# Patient Record
Sex: MALE | Race: OTHER | HISPANIC OR LATINO | Employment: OTHER | URBAN - METROPOLITAN AREA
[De-identification: names, ages, dates, MRNs, and addresses within clinical notes are randomized per-mention and may not be internally consistent; named-entity substitution may affect disease eponyms.]

---

## 2019-04-26 ENCOUNTER — TRANSCRIBE ORDERS (OUTPATIENT)
Dept: ADMINISTRATIVE | Facility: HOSPITAL | Age: 79
End: 2019-04-26

## 2019-04-26 ENCOUNTER — APPOINTMENT (OUTPATIENT)
Dept: LAB | Age: 79
End: 2019-04-26
Payer: MEDICARE

## 2019-04-26 DIAGNOSIS — I10 ESSENTIAL HYPERTENSION, MALIGNANT: ICD-10-CM

## 2019-04-26 DIAGNOSIS — N18.30 CHRONIC KIDNEY DISEASE, STAGE III (MODERATE) (HCC): ICD-10-CM

## 2019-04-26 DIAGNOSIS — I25.119 ATHEROSCLEROSIS OF NATIVE CORONARY ARTERY WITH ANGINA PECTORIS, UNSPECIFIED WHETHER NATIVE OR TRANSPLANTED HEART (HCC): ICD-10-CM

## 2019-04-26 DIAGNOSIS — Z79.01 LONG TERM (CURRENT) USE OF ANTICOAGULANTS: ICD-10-CM

## 2019-04-26 DIAGNOSIS — I10 ESSENTIAL HYPERTENSION, MALIGNANT: Primary | ICD-10-CM

## 2019-04-26 LAB
ALBUMIN SERPL BCP-MCNC: 3.7 G/DL (ref 3.5–5)
ALP SERPL-CCNC: 83 U/L (ref 46–116)
ALT SERPL W P-5'-P-CCNC: 14 U/L (ref 12–78)
ANION GAP SERPL CALCULATED.3IONS-SCNC: 7 MMOL/L (ref 4–13)
AST SERPL W P-5'-P-CCNC: 15 U/L (ref 5–45)
BASOPHILS # BLD AUTO: 0.05 THOUSANDS/ΜL (ref 0–0.1)
BASOPHILS NFR BLD AUTO: 2 % (ref 0–1)
BILIRUB SERPL-MCNC: 0.6 MG/DL (ref 0.2–1)
BUN SERPL-MCNC: 40 MG/DL (ref 5–25)
CALCIUM SERPL-MCNC: 9.1 MG/DL (ref 8.3–10.1)
CHLORIDE SERPL-SCNC: 110 MMOL/L (ref 100–108)
CHOLEST SERPL-MCNC: 141 MG/DL (ref 50–200)
CO2 SERPL-SCNC: 21 MMOL/L (ref 21–32)
CREAT SERPL-MCNC: 2.78 MG/DL (ref 0.6–1.3)
EOSINOPHIL # BLD AUTO: 0.3 THOUSAND/ΜL (ref 0–0.61)
EOSINOPHIL NFR BLD AUTO: 9 % (ref 0–6)
ERYTHROCYTE [DISTWIDTH] IN BLOOD BY AUTOMATED COUNT: 13.5 % (ref 11.6–15.1)
GFR SERPL CREATININE-BSD FRML MDRD: 21 ML/MIN/1.73SQ M
GLUCOSE P FAST SERPL-MCNC: 82 MG/DL (ref 65–99)
HCT VFR BLD AUTO: 32.4 % (ref 36.5–49.3)
HDLC SERPL-MCNC: 52 MG/DL (ref 40–60)
HGB BLD-MCNC: 10.2 G/DL (ref 12–17)
IMM GRANULOCYTES # BLD AUTO: 0 THOUSAND/UL (ref 0–0.2)
IMM GRANULOCYTES NFR BLD AUTO: 0 % (ref 0–2)
INR PPP: 2.35 (ref 0.86–1.17)
LDLC SERPL CALC-MCNC: 71 MG/DL (ref 0–100)
LYMPHOCYTES # BLD AUTO: 1.25 THOUSANDS/ΜL (ref 0.6–4.47)
LYMPHOCYTES NFR BLD AUTO: 39 % (ref 14–44)
MCH RBC QN AUTO: 32.4 PG (ref 26.8–34.3)
MCHC RBC AUTO-ENTMCNC: 31.5 G/DL (ref 31.4–37.4)
MCV RBC AUTO: 103 FL (ref 82–98)
MONOCYTES # BLD AUTO: 0.29 THOUSAND/ΜL (ref 0.17–1.22)
MONOCYTES NFR BLD AUTO: 9 % (ref 4–12)
NEUTROPHILS # BLD AUTO: 1.29 THOUSANDS/ΜL (ref 1.85–7.62)
NEUTS SEG NFR BLD AUTO: 41 % (ref 43–75)
NONHDLC SERPL-MCNC: 89 MG/DL
NRBC BLD AUTO-RTO: 0 /100 WBCS
PLATELET # BLD AUTO: 157 THOUSANDS/UL (ref 149–390)
PMV BLD AUTO: 10.5 FL (ref 8.9–12.7)
POTASSIUM SERPL-SCNC: 4.4 MMOL/L (ref 3.5–5.3)
PROT SERPL-MCNC: 7.9 G/DL (ref 6.4–8.2)
PROTHROMBIN TIME: 25.8 SECONDS (ref 11.8–14.2)
RBC # BLD AUTO: 3.15 MILLION/UL (ref 3.88–5.62)
SODIUM SERPL-SCNC: 138 MMOL/L (ref 136–145)
TRIGL SERPL-MCNC: 89 MG/DL
WBC # BLD AUTO: 3.18 THOUSAND/UL (ref 4.31–10.16)

## 2019-04-26 PROCEDURE — 85610 PROTHROMBIN TIME: CPT

## 2019-04-26 PROCEDURE — 80053 COMPREHEN METABOLIC PANEL: CPT

## 2019-04-26 PROCEDURE — 36415 COLL VENOUS BLD VENIPUNCTURE: CPT

## 2019-04-26 PROCEDURE — 80061 LIPID PANEL: CPT

## 2019-04-26 PROCEDURE — 85025 COMPLETE CBC W/AUTO DIFF WBC: CPT

## 2019-07-05 ENCOUNTER — HOSPITAL ENCOUNTER (EMERGENCY)
Facility: HOSPITAL | Age: 79
Discharge: HOME | End: 2019-07-06
Attending: EMERGENCY MEDICINE
Payer: MEDICARE

## 2019-07-05 DIAGNOSIS — L03.213 PRESEPTAL CELLULITIS: Primary | ICD-10-CM

## 2019-07-05 DIAGNOSIS — H57.89 EYE IRRITATION: ICD-10-CM

## 2019-07-05 PROCEDURE — 99283 EMERGENCY DEPT VISIT LOW MDM: CPT

## 2019-07-05 RX ORDER — TETRACAINE HYDROCHLORIDE 5 MG/ML
2 SOLUTION OPHTHALMIC ONCE
Status: COMPLETED | OUTPATIENT
Start: 2019-07-06 | End: 2019-07-05

## 2019-07-05 RX ADMIN — FLUORESCEIN SODIUM 1 STRIP: 1 STRIP OPHTHALMIC at 23:53

## 2019-07-05 RX ADMIN — TETRACAINE HYDROCHLORIDE 2 DROP: 5 SOLUTION OPHTHALMIC at 23:53

## 2019-07-06 VITALS
WEIGHT: 139 LBS | HEART RATE: 58 BPM | TEMPERATURE: 97.8 F | DIASTOLIC BLOOD PRESSURE: 61 MMHG | SYSTOLIC BLOOD PRESSURE: 102 MMHG | OXYGEN SATURATION: 95 % | RESPIRATION RATE: 18 BRPM

## 2019-07-06 PROCEDURE — 99283 EMERGENCY DEPT VISIT LOW MDM: CPT | Performed by: EMERGENCY MEDICINE

## 2019-07-06 RX ORDER — CLINDAMYCIN HYDROCHLORIDE 150 MG/1
450 CAPSULE ORAL EVERY 8 HOURS SCHEDULED
Qty: 90 CAPSULE | Refills: 0 | Status: ON HOLD | OUTPATIENT
Start: 2019-07-06 | End: 2019-07-21

## 2019-07-06 RX ORDER — ERYTHROMYCIN 5 MG/G
OINTMENT OPHTHALMIC
Qty: 1 G | Refills: 0 | Status: SHIPPED | OUTPATIENT
Start: 2019-07-06 | End: 2019-07-21 | Stop reason: HOSPADM

## 2019-07-06 NOTE — DISCHARGE INSTRUCTIONS
Periorbital Cellulitis in Adults   WHAT YOU NEED TO KNOW:   Periorbital cellulitis is an infection of the skin and tissues in the front of your eye  The infection can quickly cause vision problems  It can spread to your brain and cause meningitis  Periorbital cellulitis must be treated immediately to prevent serious complications  DISCHARGE INSTRUCTIONS:   Return to the emergency department if:   · You lose vision in your infected eye  · You have vision problems, such as double vision  · You have difficulty moving your eyeball  · You cannot close your eye due to swelling  · You have a fever  · You develop a headache and are vomiting  · You have a stiff neck  Contact your healthcare provider if:   · Your symptoms do not get better within 24 hours of treatment  · Your symptoms return  · You have questions or concerns about your condition or care  Medicines:   · Antibiotics  are used to treat a bacterial infection  · Take your medicine as directed  Contact your healthcare provider if you think your medicine is not helping or if you have side effects  Tell him or her if you are allergic to any medicine  Keep a list of the medicines, vitamins, and herbs you take  Include the amounts, and when and why you take them  Bring the list or the pill bottles to follow-up visits  Carry your medicine list with you in case of an emergency  Follow up with your healthcare provider as directed: You may need to be referred to other healthcare providers  You may also need more treatment  Write down your questions so you remember to ask them during your visits  Keep your eyes clean:  Clean your eyes with warm water daily and as needed  Wash your hands with soap and water before and after you clean your eyes  Protect your eyes:  Do not scratch or rub your eyes    © 2017 [employer] Information is for End User's use only and may not be sold, redistributed or otherwise used for commercial purposes  All illustrations and images included in CareNotes® are the copyrighted property of [employer]  or [employer]  The above information is an  only  It is not intended as medical advice for individual conditions or treatments  Talk to your doctor, nurse or pharmacist before following any medical regimen to see if it is safe and effective for you  Clindamycin (By mouth)   Clindamycin (lfwu-mi-JGH-sin)  Treats infections  Brand Name(s): Cleocin, Cleocin HCl, Cleocin Pediatric   There may be other brand names for this medicine  When This Medicine Should Not Be Used: This medicine is not right for everyone  Do not use it if you had an allergic reaction to clindamycin or lincomycin  How to Use This Medicine:   Capsule, Liquid  · Your doctor will tell you how much medicine to use  Do not use more than directed  · Capsule: Swallow with a full glass of water  · Oral liquid: Measure the oral liquid medicine with a marked measuring spoon, oral syringe, or medicine cup  · Take all of the medicine in your prescription to clear up your infection, even if you feel better after the first few doses  · Missed dose: Take a dose as soon as you remember  If it is almost time for your next dose, wait until then and take a regular dose  Do not take extra medicine to make up for a missed dose  · Store the medicine in a closed container at room temperature, away from heat, moisture, and direct light  Oral liquid: Do not refrigerate or freeze  Throw away any unused medicine after 14 days  Drugs and Foods to Avoid:   Ask your doctor or pharmacist before using any other medicine, including over-the-counter medicines, vitamins, and herbal products  · Some medicines can affect how clindamycin works  Tell your doctor if you are using erythromycin    Warnings While Using This Medicine:   · Tell your doctor if you are pregnant or breastfeeding, or if you have kidney disease, liver disease, allergies (including an allergy to aspirin), asthma, or stomach or bowel problems (including colitis)  · This medicine may cause severe skin reactions  · This medicine can cause diarrhea  Call your doctor if the diarrhea becomes severe, does not stop, or is bloody  Do not take any medicine to stop diarrhea until you have talked to your doctor  Diarrhea can occur 2 months or more after you stop taking this medicine  · Keep all medicine out of the reach of children  Never share your medicine with anyone  Possible Side Effects While Using This Medicine:   Call your doctor right away if you notice any of these side effects:  · Allergic reaction: Itching or hives, swelling in your face or hands, swelling or tingling in your mouth or throat, chest tightness, trouble breathing  · Blistering, peeling, red skin rash  · Fever, chills, cough, sore throat, body aches  · Severe diarrhea that does not go away, stomach cramps  · Unusual bleeding, bruising, or weakness  If you notice these less serious side effects, talk with your doctor:   · Mild diarrhea, nausea  If you notice other side effects that you think are caused by this medicine, tell your doctor  Call your doctor for medical advice about side effects  You may report side effects to FDA at 3-833-FDA-5441  © 2017 [employer] Information is for End User's use only and may not be sold, redistributed or otherwise used for commercial purposes  The above information is an  only  It is not intended as medical advice for individual conditions or treatments  Talk to your doctor, nurse or pharmacist before following any medical regimen to see if it is safe and effective for you

## 2019-07-06 NOTE — ED PROVIDER NOTES
History  Chief Complaint   Patient presents with    Eye Swelling     Per daughter pt with b/l eye swelling x2 weeks seen by eye Dr was told to stop one of his eye drops start a new one but swelling has gotten worse pt state feels like something is moving is his eye lashes     Pt is an [de-identified]year old male with a PMH of primary open angle glaucoma, bilateral cataracts, macular degeneration, CHF, CAD s/p bypass, hypertension presenting with bilateral eye erythema, swelling and itching  Pt has perioral erythema and swelling  States his "eyebrows itch, and something is moving in his right eyebrow, maybe a bug"  Chronic vision changes that are stable  Denies eye pain  Pt has been taking "drops" for his eye after seeing eye doctor recently which has not been working  States the swelling has been worsening  Denies fevers, photophobia or nausea  None       Past Medical History:   Diagnosis Date    CHF [facility]congestive heart failure) ([facility] [address] )     Renal disorder        Past Surgical History:   Procedure Laterality Date    ABDOMINAL SURGERY      CARDIAC PACEMAKER PLACEMENT         History reviewed  No pertinent family history  I have reviewed and agree with the history as documented  Social History     Tobacco Use    Smoking status: Former Smoker    Smokeless tobacco: Never Used    Tobacco comment: quit 40 years ago   Substance Use Topics    Alcohol use: Not Currently    Drug use: Not Currently        Review of Systems   Constitutional: Negative for chills, diaphoresis and fever  HENT: Positive for facial swelling  Eyes: Positive for discharge, itching and visual disturbance  Negative for photophobia, pain and redness  Respiratory: Negative for shortness of breath  Cardiovascular: Negative for chest pain  Gastrointestinal: Negative for nausea and vomiting  Neurological: Negative for dizziness, light-headedness and headaches         Physical Exam  Physical Exam   Constitutional: He is oriented to person, place, and time  He appears well-developed and well-nourished  No distress  HENT:   Head: Normocephalic and atraumatic  Head is with right periorbital erythema and with left periorbital erythema  Right Ear: External ear normal    Left Ear: External ear normal    Nose: Nose normal    Mouth/Throat: Oropharynx is clear and moist  No oropharyngeal exudate  Bilateral erythema and swelling periorbitally  Flaking and dryness of the skin  Eyes: Pupils are equal, round, and reactive to light  Conjunctivae and EOM are normal  Right eye exhibits no chemosis, no discharge and no exudate  No foreign body present in the right eye  Left eye exhibits no chemosis, no discharge and no exudate  No foreign body present in the left eye  Right conjunctiva is not injected  Right conjunctiva has no hemorrhage  Left conjunctiva is not injected  Left conjunctiva has no hemorrhage  Right eye exhibits normal extraocular motion and no nystagmus  Left eye exhibits normal extraocular motion and no nystagmus  Neck: Normal range of motion  Neck supple  Cardiovascular: Normal rate, regular rhythm, normal heart sounds and intact distal pulses  Pulmonary/Chest: Effort normal and breath sounds normal    Musculoskeletal: Normal range of motion  Neurological: He is alert and oriented to person, place, and time  Skin: Skin is warm and dry  Capillary refill takes less than 2 seconds  He is not diaphoretic         Vital Signs  ED Triage Vitals [07/05/19 2250]   Temperature Pulse Respirations Blood Pressure SpO2   97 8 °F (36 6 °C) 65 18 127/65 99 %      Temp src Heart Rate Source Patient Position - Orthostatic VS BP Location FiO2 (%)   -- Monitor Sitting Right arm --      Pain Score       3           Vitals:    07/05/19 2250 07/05/19 2359   BP: 127/65 102/61   Pulse: 65 58   Patient Position - Orthostatic VS: Sitting Lying         Visual Acuity      ED Medications  Medications   tetracaine 0 5 % ophthalmic solution 2 drop (2 drops Both Eyes Given by Other 7/5/19 8325)   fluorescein sodium sterile ophthalmic strip 1 strip (1 strip Both Eyes Given by Other 7/5/19 6069)       Diagnostic Studies  Results Reviewed     None                 No orders to display              Procedures  Procedures       ED Course                               MDM  Number of Diagnoses or Management Options  Eye irritation:   Preseptal cellulitis:   Diagnosis management comments: Patient has been following with ophthalmology for this issue outpatient  He has multiple chronic eye disorders  They have been tempering with medications that have not improved his symptoms  Covering patient with antibiotics for preseptal cellulitis  Call physician tomorrow and notify them of worsening condition  Educated on return precautions for orbital cellulitis  Stable and ready for discharge  Disposition  Final diagnoses:   Preseptal cellulitis   Eye irritation     Time reflects when diagnosis was documented in both MDM as applicable and the Disposition within this note     Time User Action Codes Description Comment    7/6/2019 12:59 AM Shweta Frausto Add [Q27 014] Preseptal cellulitis     7/6/2019 12:59 AM Shweta Frausto Add [H57 89] Eye irritation       ED Disposition     ED Disposition Condition Date/Time Comment    Discharge Good Sat Jul 6, 2019 12:59 AM Philly Latham discharge to home/self care  Follow-up Information     Follow up With Specialties Details Why [address], [place] [facility], Physician Assistant Schedule an appointment as soon as possible for a visit  As needed [id number] [facility] Schedule an appointment as soon as possible for a visit  As needed [address]            Discharge Medication List as of 7/6/2019  1:01 AM      START taking these medications    Details   clindamycin (CLEOCIN) 150 mg capsule Take 3 capsules (450 mg total) by mouth every 8 (eight) hours for 10 days, Starting Sat 7/6/2019, Until Tue 7/16/2019, Print      erythromycin (ILOTYCIN) ophthalmic ointment Place a 1/2 inch ribbon of ointment into the lower eyelid  , Print           No discharge procedures on file      ED Provider  Electronically Signed by           Brian Whittaker PA-C  07/06/19 3488

## 2019-07-15 ENCOUNTER — HOSPITAL ENCOUNTER (INPATIENT)
Facility: HOSPITAL | Age: 79
LOS: 6 days | Discharge: HOME HEALTH | DRG: 607 | End: 2019-07-21
Attending: EMERGENCY MEDICINE | Admitting: INTERNAL MEDICINE
Payer: MEDICARE

## 2019-07-15 ENCOUNTER — APPOINTMENT (EMERGENCY)
Dept: CT IMAGING | Facility: HOSPITAL | Age: 79
DRG: 607 | End: 2019-07-15
Payer: MEDICARE

## 2019-07-15 DIAGNOSIS — Z95.0 PACEMAKER: ICD-10-CM

## 2019-07-15 DIAGNOSIS — N18.4 CKD (CHRONIC KIDNEY DISEASE) STAGE 4, GFR 15-29 ML/MIN (HCC): ICD-10-CM

## 2019-07-15 DIAGNOSIS — R41.81 AGE-RELATED COGNITIVE DECLINE: ICD-10-CM

## 2019-07-15 DIAGNOSIS — I10 HYPERTENSION, UNSPECIFIED TYPE: ICD-10-CM

## 2019-07-15 DIAGNOSIS — N18.5 STAGE 5 CHRONIC KIDNEY DISEASE NOT ON CHRONIC DIALYSIS (HCC): ICD-10-CM

## 2019-07-15 DIAGNOSIS — Z63.8 FAMILY DISCORD: ICD-10-CM

## 2019-07-15 DIAGNOSIS — L03.213 CELLULITIS OF PERIORBITAL REGION OF BOTH EYES: Primary | ICD-10-CM

## 2019-07-15 DIAGNOSIS — R60.0 PERIORBITAL EDEMA: ICD-10-CM

## 2019-07-15 DIAGNOSIS — L03.211 FACIAL CELLULITIS: ICD-10-CM

## 2019-07-15 LAB
ALBUMIN SERPL BCP-MCNC: 3.9 G/DL (ref 3.5–5)
ALP SERPL-CCNC: 84 U/L (ref 46–116)
ALT SERPL W P-5'-P-CCNC: 13 U/L (ref 12–78)
ANION GAP SERPL CALCULATED.3IONS-SCNC: 12 MMOL/L (ref 4–13)
AST SERPL W P-5'-P-CCNC: 49 U/L (ref 5–45)
BASOPHILS # BLD AUTO: 0.03 THOUSANDS/ΜL (ref 0–0.1)
BASOPHILS NFR BLD AUTO: 1 % (ref 0–1)
BILIRUB SERPL-MCNC: 0.62 MG/DL (ref 0.2–1)
BUN SERPL-MCNC: 67 MG/DL (ref 5–25)
CALCIUM SERPL-MCNC: 10.4 MG/DL (ref 8.3–10.1)
CHLORIDE SERPL-SCNC: 102 MMOL/L (ref 100–108)
CO2 SERPL-SCNC: 22 MMOL/L (ref 21–32)
CREAT SERPL-MCNC: 3.72 MG/DL (ref 0.6–1.3)
EOSINOPHIL # BLD AUTO: 0.37 THOUSAND/ΜL (ref 0–0.61)
EOSINOPHIL NFR BLD AUTO: 6 % (ref 0–6)
ERYTHROCYTE [DISTWIDTH] IN BLOOD BY AUTOMATED COUNT: 13 % (ref 11.6–15.1)
GFR SERPL CREATININE-BSD FRML MDRD: 14 ML/MIN/1.73SQ M
GLUCOSE SERPL-MCNC: 93 MG/DL (ref 65–140)
HCT VFR BLD AUTO: 35.6 % (ref 36.5–49.3)
HGB BLD-MCNC: 11.1 G/DL (ref 12–17)
IMM GRANULOCYTES # BLD AUTO: 0.02 THOUSAND/UL (ref 0–0.2)
IMM GRANULOCYTES NFR BLD AUTO: 0 % (ref 0–2)
LYMPHOCYTES # BLD AUTO: 1.95 THOUSANDS/ΜL (ref 0.6–4.47)
LYMPHOCYTES NFR BLD AUTO: 33 % (ref 14–44)
MCH RBC QN AUTO: 32.3 PG (ref 26.8–34.3)
MCHC RBC AUTO-ENTMCNC: 31.2 G/DL (ref 31.4–37.4)
MCV RBC AUTO: 104 FL (ref 82–98)
MONOCYTES # BLD AUTO: 0.76 THOUSAND/ΜL (ref 0.17–1.22)
MONOCYTES NFR BLD AUTO: 13 % (ref 4–12)
NEUTROPHILS # BLD AUTO: 2.75 THOUSANDS/ΜL (ref 1.85–7.62)
NEUTS SEG NFR BLD AUTO: 47 % (ref 43–75)
NRBC BLD AUTO-RTO: 0 /100 WBCS
PLATELET # BLD AUTO: 205 THOUSANDS/UL (ref 149–390)
PMV BLD AUTO: 9.6 FL (ref 8.9–12.7)
POTASSIUM SERPL-SCNC: 5.1 MMOL/L (ref 3.5–5.3)
PROT SERPL-MCNC: 8.5 G/DL (ref 6.4–8.2)
RBC # BLD AUTO: 3.44 MILLION/UL (ref 3.88–5.62)
SODIUM SERPL-SCNC: 136 MMOL/L (ref 136–145)
WBC # BLD AUTO: 5.88 THOUSAND/UL (ref 4.31–10.16)

## 2019-07-15 PROCEDURE — 99285 EMERGENCY DEPT VISIT HI MDM: CPT

## 2019-07-15 PROCEDURE — 99284 EMERGENCY DEPT VISIT MOD MDM: CPT | Performed by: NURSE PRACTITIONER

## 2019-07-15 PROCEDURE — 85025 COMPLETE CBC W/AUTO DIFF WBC: CPT | Performed by: NURSE PRACTITIONER

## 2019-07-15 PROCEDURE — 80053 COMPREHEN METABOLIC PANEL: CPT | Performed by: NURSE PRACTITIONER

## 2019-07-15 PROCEDURE — 36415 COLL VENOUS BLD VENIPUNCTURE: CPT | Performed by: NURSE PRACTITIONER

## 2019-07-15 PROCEDURE — 70480 CT ORBIT/EAR/FOSSA W/O DYE: CPT

## 2019-07-15 RX ORDER — KETOTIFEN FUMARATE 0.35 MG/ML
1 SOLUTION/ DROPS OPHTHALMIC ONCE
Status: COMPLETED | OUTPATIENT
Start: 2019-07-15 | End: 2019-07-16

## 2019-07-15 RX ORDER — VANCOMYCIN HYDROCHLORIDE 1 G/200ML
1000 INJECTION, SOLUTION INTRAVENOUS ONCE
Status: DISCONTINUED | OUTPATIENT
Start: 2019-07-16 | End: 2019-07-16

## 2019-07-15 SDOH — SOCIAL STABILITY - SOCIAL INSECURITY: OTHER SPECIFIED PROBLEMS RELATED TO PRIMARY SUPPORT GROUP: Z63.8

## 2019-07-16 PROBLEM — N28.9 RENAL DISORDER: Status: ACTIVE | Noted: 2019-07-16

## 2019-07-16 PROBLEM — L03.211 FACIAL CELLULITIS: Status: ACTIVE | Noted: 2019-07-16

## 2019-07-16 PROBLEM — K21.9 GERD (GASTROESOPHAGEAL REFLUX DISEASE): Status: ACTIVE | Noted: 2019-07-16

## 2019-07-16 PROBLEM — N17.9 AKI (ACUTE KIDNEY INJURY) (HCC): Status: ACTIVE | Noted: 2019-07-16

## 2019-07-16 PROBLEM — E83.52 HYPERCALCEMIA: Status: ACTIVE | Noted: 2019-07-16

## 2019-07-16 PROBLEM — I50.9 CHF (CONGESTIVE HEART FAILURE) (HCC): Status: ACTIVE | Noted: 2019-07-16

## 2019-07-16 PROBLEM — I48.91 A-FIB (HCC): Status: ACTIVE | Noted: 2019-07-16

## 2019-07-16 PROBLEM — N25.81 SECONDARY HYPERPARATHYROIDISM (HCC): Status: ACTIVE | Noted: 2019-07-16

## 2019-07-16 PROBLEM — I10 HTN (HYPERTENSION): Status: ACTIVE | Noted: 2019-07-16

## 2019-07-16 PROBLEM — N18.4 CKD (CHRONIC KIDNEY DISEASE), STAGE IV (HCC): Status: ACTIVE | Noted: 2019-07-16

## 2019-07-16 PROCEDURE — 99222 1ST HOSP IP/OBS MODERATE 55: CPT | Performed by: INTERNAL MEDICINE

## 2019-07-16 PROCEDURE — 99223 1ST HOSP IP/OBS HIGH 75: CPT | Performed by: INTERNAL MEDICINE

## 2019-07-16 RX ORDER — ISOSORBIDE DINITRATE 10 MG/1
20 TABLET ORAL
Status: DISCONTINUED | OUTPATIENT
Start: 2019-07-16 | End: 2019-07-16

## 2019-07-16 RX ORDER — PANTOPRAZOLE SODIUM 40 MG/1
40 TABLET, DELAYED RELEASE ORAL
Status: DISCONTINUED | OUTPATIENT
Start: 2019-07-16 | End: 2019-07-21 | Stop reason: HOSPADM

## 2019-07-16 RX ORDER — ATORVASTATIN CALCIUM 80 MG/1
80 TABLET, FILM COATED ORAL DAILY
COMMUNITY
Start: 2019-03-19

## 2019-07-16 RX ORDER — TAMSULOSIN HYDROCHLORIDE 0.4 MG/1
0.8 CAPSULE ORAL
Status: DISCONTINUED | OUTPATIENT
Start: 2019-07-16 | End: 2019-07-21 | Stop reason: HOSPADM

## 2019-07-16 RX ORDER — FUROSEMIDE 40 MG/1
40 TABLET ORAL 2 TIMES DAILY
COMMUNITY
Start: 2019-03-19

## 2019-07-16 RX ORDER — GABAPENTIN 300 MG/1
300 CAPSULE ORAL DAILY
Status: DISCONTINUED | OUTPATIENT
Start: 2019-07-16 | End: 2019-07-21 | Stop reason: HOSPADM

## 2019-07-16 RX ORDER — ISOSORBIDE DINITRATE 20 MG/1
20 TABLET ORAL 3 TIMES DAILY
COMMUNITY
Start: 2019-06-13 | End: 2020-01-02

## 2019-07-16 RX ORDER — METOPROLOL SUCCINATE 25 MG/1
25 TABLET, EXTENDED RELEASE ORAL DAILY
Status: DISCONTINUED | OUTPATIENT
Start: 2019-07-17 | End: 2019-07-21 | Stop reason: HOSPADM

## 2019-07-16 RX ORDER — HYDRALAZINE HYDROCHLORIDE 25 MG/1
50 TABLET, FILM COATED ORAL EVERY 8 HOURS SCHEDULED
Status: DISCONTINUED | OUTPATIENT
Start: 2019-07-16 | End: 2019-07-16

## 2019-07-16 RX ORDER — METOPROLOL SUCCINATE 25 MG/1
25 TABLET, EXTENDED RELEASE ORAL DAILY
Status: DISCONTINUED | OUTPATIENT
Start: 2019-07-16 | End: 2019-07-16

## 2019-07-16 RX ORDER — GABAPENTIN 300 MG/1
300 CAPSULE ORAL DAILY
COMMUNITY
Start: 2019-03-19

## 2019-07-16 RX ORDER — POTASSIUM CHLORIDE 750 MG/1
10 TABLET, EXTENDED RELEASE ORAL DAILY
COMMUNITY

## 2019-07-16 RX ORDER — ATORVASTATIN CALCIUM 80 MG/1
80 TABLET, FILM COATED ORAL
Status: DISCONTINUED | OUTPATIENT
Start: 2019-07-16 | End: 2019-07-21 | Stop reason: HOSPADM

## 2019-07-16 RX ORDER — PANTOPRAZOLE SODIUM 40 MG/1
40 TABLET, DELAYED RELEASE ORAL DAILY
COMMUNITY
Start: 2019-03-19

## 2019-07-16 RX ORDER — DIPHENHYDRAMINE HYDROCHLORIDE 50 MG/ML
25 INJECTION INTRAMUSCULAR; INTRAVENOUS EVERY 6 HOURS PRN
Status: DISCONTINUED | OUTPATIENT
Start: 2019-07-16 | End: 2019-07-21 | Stop reason: HOSPADM

## 2019-07-16 RX ORDER — POTASSIUM CHLORIDE 750 MG/1
10 TABLET, EXTENDED RELEASE ORAL DAILY
Status: DISCONTINUED | OUTPATIENT
Start: 2019-07-16 | End: 2019-07-17

## 2019-07-16 RX ORDER — HYDRALAZINE HYDROCHLORIDE 25 MG/1
50 TABLET, FILM COATED ORAL EVERY 8 HOURS SCHEDULED
Status: DISCONTINUED | OUTPATIENT
Start: 2019-07-16 | End: 2019-07-21 | Stop reason: HOSPADM

## 2019-07-16 RX ORDER — SODIUM CHLORIDE 9 MG/ML
75 INJECTION, SOLUTION INTRAVENOUS CONTINUOUS
Status: DISCONTINUED | OUTPATIENT
Start: 2019-07-16 | End: 2019-07-16

## 2019-07-16 RX ORDER — ACETAMINOPHEN 325 MG/1
650 TABLET ORAL EVERY 6 HOURS PRN
Status: DISCONTINUED | OUTPATIENT
Start: 2019-07-16 | End: 2019-07-21 | Stop reason: HOSPADM

## 2019-07-16 RX ORDER — HYDRALAZINE HYDROCHLORIDE 25 MG/1
50 TABLET, FILM COATED ORAL 3 TIMES DAILY
COMMUNITY
Start: 2019-04-25

## 2019-07-16 RX ORDER — ISOSORBIDE DINITRATE 10 MG/1
20 TABLET ORAL
Status: DISCONTINUED | OUTPATIENT
Start: 2019-07-16 | End: 2019-07-21 | Stop reason: HOSPADM

## 2019-07-16 RX ORDER — METOPROLOL SUCCINATE 25 MG/1
25 TABLET, EXTENDED RELEASE ORAL DAILY
COMMUNITY
Start: 2019-03-19

## 2019-07-16 RX ORDER — TAMSULOSIN HYDROCHLORIDE 0.4 MG/1
0.8 CAPSULE ORAL
COMMUNITY
Start: 2019-05-17 | End: 2020-05-16

## 2019-07-16 RX ORDER — VANCOMYCIN HYDROCHLORIDE 1 G/200ML
15 INJECTION, SOLUTION INTRAVENOUS EVERY 12 HOURS
Status: DISCONTINUED | OUTPATIENT
Start: 2019-07-16 | End: 2019-07-16

## 2019-07-16 RX ORDER — ACETAMINOPHEN 500 MG
500 TABLET ORAL EVERY 6 HOURS PRN
Status: ON HOLD | COMMUNITY
End: 2019-07-21

## 2019-07-16 RX ORDER — POTASSIUM CHLORIDE 1125 MG/1
15 TABLET, EXTENDED RELEASE ORAL 2 TIMES DAILY
Status: ON HOLD | COMMUNITY
End: 2019-07-21

## 2019-07-16 RX ORDER — METHYLPREDNISOLONE SODIUM SUCCINATE 40 MG/ML
20 INJECTION, POWDER, LYOPHILIZED, FOR SOLUTION INTRAMUSCULAR; INTRAVENOUS EVERY 12 HOURS SCHEDULED
Status: DISCONTINUED | OUTPATIENT
Start: 2019-07-16 | End: 2019-07-18

## 2019-07-16 RX ORDER — ONDANSETRON 2 MG/ML
4 INJECTION INTRAMUSCULAR; INTRAVENOUS EVERY 6 HOURS PRN
Status: DISCONTINUED | OUTPATIENT
Start: 2019-07-16 | End: 2019-07-21 | Stop reason: HOSPADM

## 2019-07-16 RX ORDER — SODIUM CHLORIDE 9 MG/ML
75 INJECTION, SOLUTION INTRAVENOUS CONTINUOUS
Status: DISCONTINUED | OUTPATIENT
Start: 2019-07-16 | End: 2019-07-17

## 2019-07-16 RX ORDER — CALCIUM CARBONATE 200(500)MG
1000 TABLET,CHEWABLE ORAL DAILY PRN
Status: DISCONTINUED | OUTPATIENT
Start: 2019-07-16 | End: 2019-07-16

## 2019-07-16 RX ORDER — WARFARIN SODIUM 3 MG/1
3 TABLET ORAL
Status: DISCONTINUED | OUTPATIENT
Start: 2019-07-16 | End: 2019-07-20

## 2019-07-16 RX ADMIN — SODIUM CHLORIDE 75 ML/HR: 0.9 INJECTION, SOLUTION INTRAVENOUS at 14:14

## 2019-07-16 RX ADMIN — VANCOMYCIN HYDROCHLORIDE 1250 MG: 5 INJECTION, POWDER, LYOPHILIZED, FOR SOLUTION INTRAVENOUS at 02:27

## 2019-07-16 RX ADMIN — ISOSORBIDE DINITRATE 20 MG: 10 TABLET ORAL at 08:58

## 2019-07-16 RX ADMIN — GABAPENTIN 300 MG: 300 CAPSULE ORAL at 08:58

## 2019-07-16 RX ADMIN — KETOTIFEN FUMARATE 1 DROP: 0.35 SOLUTION/ DROPS OPHTHALMIC at 00:12

## 2019-07-16 RX ADMIN — TAMSULOSIN HYDROCHLORIDE 0.8 MG: 0.4 CAPSULE ORAL at 16:59

## 2019-07-16 RX ADMIN — PANTOPRAZOLE SODIUM 40 MG: 40 TABLET, DELAYED RELEASE ORAL at 06:18

## 2019-07-16 RX ADMIN — ATORVASTATIN CALCIUM 80 MG: 80 TABLET, FILM COATED ORAL at 16:59

## 2019-07-16 RX ADMIN — CEFTRIAXONE SODIUM 1000 MG: 10 INJECTION, POWDER, FOR SOLUTION INTRAVENOUS at 00:11

## 2019-07-16 RX ADMIN — DIPHENHYDRAMINE HYDROCHLORIDE 25 MG: 50 INJECTION, SOLUTION INTRAMUSCULAR; INTRAVENOUS at 02:05

## 2019-07-16 RX ADMIN — SODIUM CHLORIDE 75 ML/HR: 0.9 INJECTION, SOLUTION INTRAVENOUS at 02:05

## 2019-07-16 RX ADMIN — ISOSORBIDE DINITRATE 20 MG: 10 TABLET ORAL at 12:07

## 2019-07-16 RX ADMIN — SODIUM CHLORIDE 75 ML/HR: 0.9 INJECTION, SOLUTION INTRAVENOUS at 17:27

## 2019-07-16 RX ADMIN — METOPROLOL SUCCINATE 25 MG: 25 TABLET, EXTENDED RELEASE ORAL at 08:58

## 2019-07-16 RX ADMIN — POTASSIUM CHLORIDE 10 MEQ: 750 TABLET, EXTENDED RELEASE ORAL at 08:58

## 2019-07-16 RX ADMIN — WARFARIN SODIUM 3 MG: 3 TABLET ORAL at 17:08

## 2019-07-16 RX ADMIN — HYDRALAZINE HYDROCHLORIDE 50 MG: 25 TABLET ORAL at 14:13

## 2019-07-16 RX ADMIN — HYDRALAZINE HYDROCHLORIDE 50 MG: 25 TABLET ORAL at 06:18

## 2019-07-16 RX ADMIN — METHYLPREDNISOLONE SODIUM SUCCINATE 20 MG: 40 INJECTION, POWDER, FOR SOLUTION INTRAMUSCULAR; INTRAVENOUS at 16:56

## 2019-07-16 NOTE — ASSESSMENT & PLAN NOTE
Creatinine today 3 72, previously 2 78  Order gentle IV fluids   Monitor closely for signs of fluid overload
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
## 2019-07-16 NOTE — CONSULTS
Consultation - Nephrology   Cynthia Ferraro [de-identified] y o  male MRN: [id number]  Unit/Bed#: E4 -01 Encounter: [id number]    ASSESSMENT/PLAN:  Acute kidney injury (POA): likely pre-renal, concern for over-diuresis  - presented with creatinine of 3 7    -baseline creatinine per records appears to be mid 2s to low 3's    -recent CT:  Negative for hydro   -check urinalysis  -check bladder scan  -I/O   -continue to hold Lasix    -Gabapentin is renally dosed   -agree with IV fluids   -continue to monitor renal indices  CKD stage 4:  Previously followed with nephrologist in [place], [place], unsure of name of physician  Required short term hemodialysis in the past   Etiology of chronic disease likely secondary to hypertension, age related nephron loss, +/- cardiorenal syndrome   -will need outpatient follow-up locally  Essential hypertension:  Blood pressure overall stable and acceptable with period of elevation    -continue hydralazin 50 mg every 8 hours, isosorbide 20 mg 3 times a day, and metoprolol 25 mg daily with holding parameters for systolic blood pressure less than 130  -on Flomax  Ischemic cardiomyopathy/ CHF:  With ejection fraction of 20%  Following with transplantation at [facility]   -daily weight, I/O   -diuretic remains on hold  -1 5 L per day fluid restriction  Periorbital cellulitis: as seen on CT scan   -continues on antibiotics  -infectious Disease following  Secondary hyperparathyroidism:  -will check PTH and phosphorus level  Hypercalcemia:   - calcium level of 10 4   -will discontinue calcium carbonate   -continue with IV fluids  -will continue to monitor        HISTORY OF PRESENT ILLNESS:  Requesting Physician: Kaela Aguila DO  Reason for Consult: SHAHIDA on CKD     Cynthia Ferraro is a [de-identified]y o  year old male with past medical history of CKD, hypertension, BPH, CHF, cardiac pacemaker, coronary artery disease, atrial fibrillation, who was admitted to [facility] after presenting with orbital swelling  The patient states he has been experiencing issues with his eyes for quite some time  He was taking prescribed eyedrops for this  He was also taking over-the-counter eyedrops  He states that the drops significantly made his eyes worse  He recently moved from [facility]  He is currently residing with his daughter  He states that he is having some social issues at home  He is hoping to move to [place] with his other daughter  A renal consultation is requested today for assistance in the management of SHAHIDA on CKD IV  He states that he has previously followed with a nephrologist   He is unsure of the cause of his CKD  He states that he required dialysis once along time ago  As of discussion today, he would not wish to pursue dialysis if his renal function was to worsen  We discussed that he should establish care with Nephrology while he resides in [facility]  He denies any recent fevers or chills  He denies chest pain  He states that he occasionally becomes short of breath with exertion  He denies nausea, vomiting, diarrhea  He states that he has issues with constipation  He states he is urinating without difficulty  He denies the use of NSAIDs      PAST MEDICAL HISTORY:  Past Medical History:   Diagnosis Date    CHF [facility]congestive [facility] failure) ([facility] [address] )     Renal disorder        PAST SURGICAL HISTORY:  Past Surgical History:   Procedure Laterality Date    ABDOMINAL SURGERY      CARDIAC PACEMAKER PLACEMENT         ALLERGIES:  No Known Allergies    SOCIAL HISTORY:  Social History     Substance and Sexual Activity   Alcohol Use Never    Frequency: Never    Drinks per session: Patient refused    Binge frequency: Patient refused     Social History     Substance and Sexual Activity   Drug Use Never     Social History     Tobacco Use   Smoking Status Former Smoker    Packs/day: 2 00    Years: 30 00    Pack years: 60 00    Last attempt to quit: Agata Chu Years since quittin 5   Smokeless Tobacco Never Used   Tobacco Comment    quit 40 years ago       FAMILY HISTORY:  History reviewed  No pertinent family history  MEDICATIONS:    Current Facility-Administered Medications:     acetaminophen (TYLENOL) tablet 650 mg, 650 mg, Oral, Q6H PRN, Hernesto Aj PA-C    atorvastatin (LIPITOR) tablet 80 mg, 80 mg, Oral, Daily With Dinner, Hernesto Aj PA-C    calcium carbonate (TUMS) chewable tablet 1,000 mg, 1,000 mg, Oral, Daily PRN, Hernesto Aj PA-C    [START ON 2019] ceftriaxone (ROCEPHIN) 1 g/50 mL in dextrose IVPB, 1,000 mg, Intravenous, Q24H, Hernesto Aj PA-C    diphenhydrAMINE (BENADRYL) injection 25 mg, 25 mg, Intravenous, Q6H PRN, Hernesto Aj PA-C, 25 mg at 19 0205    gabapentin (NEURONTIN) capsule 300 mg, 300 mg, Oral, Daily, Hernesto Aj PA-C, 300 mg at 19 0858    hydrALAZINE (APRESOLINE) tablet 50 mg, 50 mg, Oral, Q8H [facility], AUDREY Cheek    isosorbide dinitrate (ISORDIL) tablet 20 mg, 20 mg, Oral, TID after meals, AUDREY Cheek    [START ON 2019] metoprolol succinate (TOPROL-XL) 24 hr tablet 25 mg, 25 mg, Oral, Daily, AUDREY Cheek    ondansetron [facility]) injection 4 mg, 4 mg, Intravenous, Q6H PRN, Hernesto Aj PA-C    pantoprazole (PROTONIX) EC tablet 40 mg, 40 mg, Oral, Early Morning, Hernesto Aj PA-C, 40 mg at 19 0618    potassium chloride (K-DUR,KLOR-CON) CR tablet 10 mEq, 10 mEq, Oral, Daily, Hernesto Aj PA-C, 10 mEq at 19 0858    sodium chloride 0 9 % infusion, 75 mL/hr, Intravenous, Continuous, Hernesto Aj PA-C, Last Rate: 75 mL/hr at 19 020, 75 mL/hr at 19    tamsulosin (FLOMAX) capsule 0 8 mg, 0 8 mg, Oral, Daily With Dinner, Hernesto Aj PA-C    REVIEW OF SYSTEMS:  A complete review of systems was performed and found to be negative unless otherwise noted in the history of present illness  General: No fevers, chills  Cardiovascular:  + chest pain, - leg edema  Respiratory: No cough, sputum production,  - shortness of breath  Gastrointestinal:  - nausea/vomiting,  - diarrhea,  - abdominal pain  Genitourinary: No hematuria  No foamy urine    No dysuria    PHYSICAL EXAM:  Current Weight: Weight - Scale: 64 2 kg (141 lb 8 6 oz)  First Weight: Weight - Scale: 64 2 kg (141 lb 8 6 oz)  Vitals:    07/16/19 0050 07/16/19 0300 07/16/19 0616 07/16/19 0700   BP: (!) 180/91 143/90 120/64 132/78   BP Location: Right arm Left arm Left arm Left arm   Pulse: 74 76 67 66   Resp: 18 18  18   Temp: 98 5 °F (36 9 °C)   97 5 °F (36 4 °C)   TempSrc: Temporal   Tympanic   SpO2: 99%   99%   Weight: 64 2 kg (141 lb 8 6 oz)      Height: 5' 7" (1 702 m)          Intake/Output Summary (Last 24 hours) at 7/16/2019 0956  Last data filed at 7/16/2019 0900  Gross per 24 hour   Intake 480 ml   Output 300 ml   Net 180 ml     General: NAD  Skin: warm, dry, intact, no rash  HEENT: Moist mucous membranes, orbital edema  Neck: No apparent JVD appreciated  Chest:lung sounds clear B/L, on RA   CVS:Regular rate and rhythm, no murmer   Abdomen: Soft, round, non-tender, +BS  Extremities: No B/L LE edema present  Neuro: alert and oriented, studdered speech  Psych: appropriate mood and affect     Invasive Devices:      Lab Results:   Results from last 7 days   Lab Units 07/15/19  2140   WBC Thousand/uL 5 88   HEMOGLOBIN g/dL 11 1*   HEMATOCRIT % 35 6*   PLATELETS Thousands/uL 205   POTASSIUM mmol/L 5 1   CHLORIDE mmol/L 102   CO2 mmol/L 22   BUN mg/dL 67*   CREATININE mg/dL 3 72*   CALCIUM mg/dL 10 4*   ALK PHOS U/L 84   ALT U/L 13   AST U/L 49*

## 2019-07-16 NOTE — PROGRESS NOTES
Vancomycin Assessment    Taisha Fay is a [de-identified] y o  male who is currently receiving vancomycin 1 gm q12h  for other cellulitis   Relevant clinical data and objective history reviewed:  Creatinine   Date Value Ref Range Status   07/15/2019 3 72 (H) 0 60 - 1 30 mg/dL Final     Comment:     Standardized to IDMS reference method   04/26/2019 2 78 (H) 0 60 - 1 30 mg/dL Final     Comment:     Standardized to IDMS reference method     BP (!) 180/91 (BP Location: Right arm)   Pulse 74   Temp 98 5 °F (36 9 °C) (Temporal)   Resp 18   Ht 5' 7" (1 702 m)   Wt 64 2 kg (141 lb 8 6 oz)   SpO2 99%   BMI 22 17 kg/m²   No intake/output data recorded  Lab Results   Component Value Date/Time    BUN 67 (H) 07/15/2019 09:40 PM    WBC 5 88 07/15/2019 09:40 PM    HGB 11 1 (L) 07/15/2019 09:40 PM    HCT 35 6 (L) 07/15/2019 09:40 PM     (H) 07/15/2019 09:40 PM     07/15/2019 09:40 PM     Temp Readings from Last 3 Encounters:   07/16/19 98 5 °F (36 9 °C) (Temporal)   07/05/19 97 8 °F (36 6 °C)     Vancomycin Days of Therapy: 1      Assessment/Plan  The patient is currently on vancomycin utilizing pulse dosing based on actual body weight  Baseline risks associated with therapy include: pre-existing renal impairment  The patient is currently receiving 1 gm q12h  and after clinical evaluation will be changed to 1250mg q48h   or pulse dosing  Pharmacy will also follow closely for s/sx of nephrotoxicity, infusion reactions and appropriateness of therapy  BMP and CBC will be ordered per protocol  Plan for trough as patient approaches steady state, prior to the 3rd  dose at approximately 7/20  Am  Due to infection severity, will target a trough of 15-20 (appropriate for most indications)   Pharmacy will continue to follow the patients culture results and clinical progress daily      Gladys Cisneros, Pharmacist

## 2019-07-16 NOTE — ED NOTES
[facility]  07/15/19 8201

## 2019-07-16 NOTE — PHYSICIAN ADVISOR
Current patient class: Inpatient  The patient is currently on Hospital Day: 2 at [facility]      The patient was admitted to the hospital at [phone] on 7/15/19 for the following diagnosis:  Eye pain [H57 10]  Pacemaker [Z95 0]  Family discord [Z63 8]  Cellulitis of periorbital region of both eyes [L03 213]  Stage 5 chronic kidney disease not on chronic dialysis ([facility] [address] ) [N18 5]  Hypertension, unspecified type [I10]       There is documentation in the medical record of an expected length of stay of at least 2 midnights  The patient is therefore expected to satisfy the 2 midnight benchmark and given the 2 midnight presumption is appropriate for INPATIENT ADMISSION  Given this expectation of a satisfying stay, CMS instructs us that the patient is most often appropriate for inpatient admission under part A provided medical necessity is documented in the chart  After review of the relevant documentation, labs, vital signs and test results, the patient is appropriate for INPATIENT ADMISSION  Admission to the hospital as an inpatient is a complex decision making process which requires the practitioner to consider the patients presenting complaint, history and physical examination and all relevant testing  With this in mind, in this case, the patient was deemed appropriate for INPATIENT ADMISSION  After review of the documentation and testing available at the time of the admission I concur with this clinical determination of medical necessity  Rationale is as follows: The patient is a [de-identified] yrs old Male who presented to the ED at 7/15/2019  8:30 PM with a chief complaint of Eye Pain (patient c/o eye burning, evaluted yesterday for same with negative findings ) and Medical Problem (patient reports his care is being neglected at home  )    Patient was put on IV antibiotics for concern for periorbital cellulitis but this seems more of a chronic issue per Infectious Disease and the patient is being monitored  Patient also was found to be in acute renal failure with a history of chronic kidney disease stage 4  The patient came in with creatinine of 3 7  His baseline is around 3  Patient was on a lot of diuretics and that has been held  The patient is getting gentle hydration but given his ejection fraction of 20% there being very cautious as far as fluid management goes  Given that this is an 71-year-old male patient who came in with eye swelling which was getting progressively worse as well as having acute renal failure being on diuretics with cardiomyopathy needing close management of fluid status and kidney function the patient is expected to cross the 2 midnight benchmark as set by Medicare and is therefore inpatient admission appropriate based on medical necessity        The patients vitals on arrival were ED Triage Vitals   Temperature Pulse Respirations Blood Pressure SpO2   07/15/19 2038 07/15/19 2033 07/15/19 2033 07/15/19 2038 07/15/19 2033   97 9 °F (36 6 °C) 79 16 125/72 98 %      Temp Source Heart Rate Source Patient Position - Orthostatic VS BP Location FiO2 (%)   07/16/19 0050 07/16/19 0030 07/15/19 2038 07/15/19 2038 --   Temporal Monitor Sitting Right arm       Pain Score       07/16/19 0050       7           Past Medical History:   Diagnosis Date    CHF (congestive heart failure) (HCC)     Renal disorder      Past Surgical History:   Procedure Laterality Date    ABDOMINAL SURGERY      CARDIAC PACEMAKER PLACEMENT             Consults have been placed to:   IP CONSULT TO CASE MANAGEMENT  IP CONSULT TO CASE MANAGEMENT  IP CONSULT TO PHARMACY  IP CONSULT TO INFECTIOUS DISEASES  IP CONSULT TO NEPHROLOGY  IP CONSULT TO OPHTHALMOLOGY    Vitals:    07/16/19 0050 07/16/19 0300 07/16/19 0616 07/16/19 0700   BP: (!) 180/91 143/90 120/64 132/78   BP Location: Right arm Left arm Left arm Left arm   Pulse: 74 76 67 66   Resp: 18 18  18   Temp: 98 5 °F (36 9 °C)   97 5 °F (36 4 °C)   TempSrc: Temporal   Tympanic   SpO2: 99%   99%   Weight: 64 2 kg (141 lb 8 6 oz)      Height: 5' 7" (1 702 m)          Most recent labs:    Recent Labs     07/15/19  2140   WBC 5 88   HGB 11 1*   HCT 35 6*      K 5 1   CALCIUM 10 4*   BUN 67*   CREATININE 3 72*   AST 49*   ALT 13   ALKPHOS 84       Scheduled Meds:  Current Facility-Administered Medications:  acetaminophen 650 mg Oral Q6H PRN Rosy Manriquez PA-C    atorvastatin 80 mg Oral Daily With JEFFERY Chambers    diphenhydrAMINE 25 mg Intravenous Q6H PRN Rosy Manriquez PA-C    gabapentin 300 mg Oral Daily Rosy Manriquez PA-C    hydrALAZINE 50 mg Oral Q8H [address] AUDREY Lynch    isosorbide dinitrate 20 mg Oral TID after meals AUDREY Lynch    [START ON 7/17/2019] metoprolol succinate 25 mg Oral Daily AUDREY Lynch    ondansetron 4 mg Intravenous Q6H PRN Rosy Manriquez PA-C    pantoprazole 40 mg Oral Early Morning Rosy Manriquez PA-C    potassium chloride 10 mEq Oral Daily Rosy Manriquez PA-C    sodium chloride 75 mL/hr Intravenous Continuous Jen Lipoma, DO Last Rate: 75 mL/hr (07/16/19 1414)   tamsulosin 0 8 mg Oral Daily With JEFFERY Chambers      Continuous Infusions:  sodium chloride 75 mL/hr Last Rate: 75 mL/hr (07/16/19 1414)     PRN Meds:   acetaminophen    diphenhydrAMINE    ondansetron    Surgical procedures (if appropriate):

## 2019-07-16 NOTE — NURSING NOTE
Patient's face was washed this morning and warm compresses applied to eyes  Continuing to put warm compresses over eyes followed by ice pack  Patient was able to open eyes a little bit to see this morning after the treatment, then later this afternoon, the patient noted that the swelling in his eyes was subsiding

## 2019-07-16 NOTE — PLAN OF CARE
Problem: Potential for Falls  Goal: Patient will remain free of falls  Description  INTERVENTIONS:  - Assess patient frequently for physical needs  -  Identify cognitive and physical deficits and behaviors that affect risk of falls    -  [place] fall precautions as indicated by assessment   - Educate patient/family on patient safety including physical limitations  - Instruct patient to call for assistance with activity based on assessment  - Modify environment to reduce risk of injury  - Consider OT/PT consult to assist with strengthening/mobility  7/16/2019 0350 by Tracy Chan RN  Outcome: Progressing  7/16/2019 0350 by Tracy Chan RN  Outcome: Progressing     Problem: Prexisting or High Potential for Compromised Skin Integrity  Goal: Skin integrity is maintained or improved  Description  INTERVENTIONS:  - Identify patients at risk for skin breakdown  - Assess and monitor skin integrity  - Assess and monitor nutrition and hydration status  - Monitor labs (i e  albumin)  - Assess for incontinence   - Turn and reposition patient  - Assist with mobility/ambulation  - Relieve pressure over bony prominences  - Avoid friction and shearing  - Provide appropriate hygiene as needed including keeping skin clean and dry  - Evaluate need for skin moisturizer/barrier cream  - Collaborate with interdisciplinary team (i e  Nutrition, Rehabilitation, etc )   - Patient/family teaching  7/16/2019 0350 by Tracy Chan RN  Outcome: Progressing  7/16/2019 0350 by Tracy Chan RN  Outcome: Progressing     Problem: PAIN - ADULT  Goal: Verbalizes/displays adequate comfort level or baseline comfort level  Description  Interventions:  - Encourage patient to monitor pain and request assistance  - Assess pain using appropriate pain scale  - Administer analgesics based on type and severity of pain and evaluate response  - Implement non-pharmacological measures as appropriate and evaluate response  - Consider cultural and social influences on pain and pain management  - Notify physician/advanced practitioner if interventions unsuccessful or patient reports new pain  7/16/2019 0350 by Elysia Nolasco RN  Outcome: Progressing  7/16/2019 0350 by Elysia Nolasco RN  Outcome: Progressing     Problem: INFECTION - ADULT  Goal: Absence or prevention of progression during hospitalization  Description  INTERVENTIONS:  - Assess and monitor for signs and symptoms of infection  - Monitor lab/diagnostic results  - Monitor all insertion sites, i e  indwelling lines, tubes, and drains  - Monitor endotracheal (as able) and nasal secretions for changes in amount and color  - [place] appropriate cooling/warming therapies per order  - Administer medications as ordered  - Instruct and encourage patient and family to use good hand hygiene technique  - Identify and instruct in appropriate isolation precautions for identified infection/condition  7/16/2019 0350 by Elysia Nolasco RN  Outcome: Progressing  7/16/2019 0350 by Elysia Nolasco RN  Outcome: Progressing     Problem: SAFETY ADULT  Goal: Maintain or return to baseline ADL function  Description  INTERVENTIONS:  -  Assess patient's ability to carry out ADLs; assess patient's baseline for ADL function and identify physical deficits which impact ability to perform ADLs (bathing, care of mouth/teeth, toileting, grooming, dressing, etc )  - Assess/evaluate cause of self-care deficits   - Assess range of motion  - Assess patient's mobility; develop plan if impaired  - Assess patient's need for assistive devices and provide as appropriate  - Encourage maximum independence but intervene and supervise when necessary  ¯ Involve family in performance of ADLs  ¯ Assess for home care needs following discharge   ¯ Request OT consult to assist with ADL evaluation and planning for discharge  ¯ Provide patient education as appropriate  7/16/2019 0350 by Elysia Nolasco RN  Outcome: Progressing  7/16/2019 0350 by Alfa Moseley RN  Outcome: Progressing  Goal: Maintain or return mobility status to optimal level  Description  INTERVENTIONS:  - Assess patient's baseline mobility status (ambulation, transfers, stairs, etc )    - Identify cognitive and physical deficits and behaviors that affect mobility  - Identify mobility aids required to assist with transfers and/or ambulation (gait belt, sit-to-stand, lift, walker, cane, etc )  - Independence fall precautions as indicated by assessment  - Record patient progress and toleration of activity level on Mobility SBAR; progress patient to next Phase/Stage  - Instruct patient to call for assistance with activity based on assessment  - Request Rehabilitation consult to assist with strengthening/weightbearing, etc   7/16/2019 0350 by Alfa Moseley RN  Outcome: Progressing  7/16/2019 0350 by Alfa Moseley RN  Outcome: Progressing     Problem: DISCHARGE PLANNING  Goal: Discharge to home or other facility with appropriate resources  Description  INTERVENTIONS:  - Identify barriers to discharge w/patient and caregiver  - Arrange for needed discharge resources and transportation as appropriate  - Identify discharge learning needs (meds, wound care, etc )  - Arrange for interpretive services to assist at discharge as needed  - Refer to Case Management Department for coordinating discharge planning if the patient needs post-hospital services based on physician/advanced practitioner order or complex needs related to functional status, cognitive ability, or social support system  7/16/2019 0350 by Alfa Moseley RN  Outcome: Progressing  7/16/2019 0350 by Alfa Moseley RN  Outcome: Progressing     Problem: Knowledge Deficit  Goal: Patient/family/caregiver demonstrates understanding of disease process, treatment plan, medications, and discharge instructions  Description  Complete learning assessment and assess knowledge base    Interventions:  - Provide teaching at level of understanding  - Provide teaching via preferred learning methods  7/16/2019 0350 by Guillaume Milton RN  Outcome: Progressing  7/16/2019 0350 by Guillaume Milton RN  Outcome: Progressing     Problem: METABOLIC, FLUID AND ELECTROLYTES - ADULT  Goal: Fluid balance maintained  Description  INTERVENTIONS:  - Monitor labs and assess for signs and symptoms of volume excess or deficit  - Monitor I/O and WT  - Instruct patient on fluid and nutrition as appropriate  7/16/2019 0350 by Guillaume Milton RN  Outcome: Progressing  7/16/2019 0350 by Guillaume Milton RN  Outcome: Progressing     Problem: SKIN/TISSUE INTEGRITY - ADULT  Goal: Skin integrity remains intact  Description  INTERVENTIONS  - Identify patients at risk for skin breakdown  - Assess and monitor skin integrity  - Assess and monitor nutrition and hydration status  - Monitor labs (i e  albumin)  - Assess for incontinence   - Turn and reposition patient  - Assist with mobility/ambulation  - Relieve pressure over bony prominences  - Avoid friction and shearing  - Provide appropriate hygiene as needed including keeping skin clean and dry  - Evaluate need for skin moisturizer/barrier cream  - Collaborate with interdisciplinary team (i e  Nutrition, Rehabilitation, etc )   - Patient/family teaching  7/16/2019 0350 by Guillaume Milton RN  Outcome: Progressing  7/16/2019 0350 by Guillaume Milton RN  Outcome: Progressing     Problem: MUSCULOSKELETAL - ADULT  Goal: Maintain or return mobility to safest level of function  Description  INTERVENTIONS:  - Assess patient's ability to carry out ADLs; assess patient's baseline for ADL function and identify physical deficits which impact ability to perform ADLs (bathing, care of mouth/teeth, toileting, grooming, dressing, etc )  - Assess/evaluate cause of self-care deficits   - Assess range of motion  - Assess patient's mobility; develop plan if impaired  - Assess patient's need for assistive devices and provide as appropriate  - Encourage maximum independence but intervene and supervise when necessary  - Involve family in performance of ADLs  - Assess for home care needs following discharge   - Request OT consult to assist with ADL evaluation and planning for discharge  - Provide patient education as appropriate  7/16/2019 0350 by Neelam Luo RN  Outcome: Progressing  7/16/2019 0350 by Neelam Luo RN  Outcome: Progressing

## 2019-07-16 NOTE — ASSESSMENT & PLAN NOTE
Wt Readings from Last 3 Encounters:   07/05/19 63 kg (139 lb)       On lasix at home   Will hold now due to SHAHIDA

## 2019-07-16 NOTE — SOCIAL WORK
CM met with patients daughter Charlotte William [phone]  while she had her sister Shelbie Atwood on speaker phone  Patient lives with Charlotte William in a 1 story house with 1STE  Patient uses walker  He was an assist with showering but her can dress himself  Patient is visually impaired  He is in the process of applying for the Waiver program per his daughter  Patients daughter drives him to all his appointments  Pcp is Heath CASTELLANOS with [facility]  Patients daughter want CM to find affordable housing for the patient  They are refusing STR and SNF placement  Charlotte William is in the process of becoming the patients paid care giver  Cm will give resources for [employer], [employer] and [employer]  [place] [facility] [place] after his mother   Daughters expressed if patient is placed in LTC he will leave the facility  Patient makes $692per month in SSI and $137 in pension funds   Patient uses [facility] [address] on [facility]

## 2019-07-16 NOTE — H&P
H&P- Taisha Fay 1940, [de-identified] y o  male MRN: [id number]    Unit/Bed#: ED 08 Encounter: [id number]    Primary Care Provider: Remedios Peña PA-C   Date and time admitted to hospital: 7/15/2019  8:30 PM        * Facial cellulitis  Assessment & Plan  Admit to med/surg  CT scan shows periorbital cellulitis  Started on rocephin and vanco in ED, will continue    Of note, patient was brought to ED by police  Patient tells staff that he is not being taken care of by his daughter at home and he "can't go back there"  He is requesting that his daughter who lives in [place] "sends for him"  Upon further questioning he states that he called the police because his daughter did not help him with his bath when he wanted her to  Daughter is not present in the ED  Will consult case management   HTN (hypertension)  Assessment & Plan  Continue home meds    A-fib [facility])  Assessment & Plan  Rate controlled  Continue coumadin  Check PT/INR    GERD (gastroesophageal reflux disease)  Assessment & Plan  Continue PPI    Renal disorder  Assessment & Plan  Creatinine today 3 72, previously 2 78  Order gentle IV fluids  Monitor closely for signs of fluid overload    CHF (congestive heart failure) ([facility])  Assessment & Plan  Wt Readings from Last 3 Encounters:   07/05/19 63 kg (139 lb)       On lasix at home  Will hold now due to SHAHIDA        VTE Prophylaxis: Warfarin (Coumadin)  / sequential compression device   Code Status: full code  POLST: There is no POLST form on file for this patient (pre-hospital)  Discussion with family: no family present    Anticipated Length of Stay:  Patient will be admitted on an Inpatient basis with an anticipated length of stay of  Greater than 2 midnights  Justification for Hospital Stay: patient requires IV antibiotics    Total Time for Visit, including Counseling / Coordination of Care: 45 minutes    Greater than 50% of this total time spent on direct patient counseling and coordination of care     Chief Complaint:   Eye itching and swelling    History of Present Illness:    Belkys Fraser is a [de-identified] y o  male who presents with bilateral eye itching and swelling  Patient is anxious and a poor historian  He was brought to the ED by police  Police state they found the patient outside his home  Patient states his daughter isn't caring for him at home and he can't go back there  Patient was noted to have facial swelling  He was seen in [address] [place] [address] ED yesterday for multiple complaints  He was also seen here in the ED earlier this month for eye itching and and was started on oral clindamycin and eye drops  Patient states that he feels that there is something crawling on his face that will "fall in his eyes" if he opens them  He denies eye pain  No fever  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  Review of Systems:    Review of Systems   Constitutional: Negative  HENT: Positive for facial swelling  Eyes: Positive for itching  Respiratory: Negative  Cardiovascular: Negative  Gastrointestinal: Negative  Endocrine: Negative  Genitourinary: Negative  Musculoskeletal:        Leg pain   Skin: Negative  Allergic/Immunologic: Negative  Neurological: Negative  Hematological: Negative  Psychiatric/Behavioral: Negative  Past Medical and Surgical History:     Past Medical History:   Diagnosis Date    [facility]congestive heart failure) ([facility] [address] )     Renal disorder        Past Surgical History:   Procedure Laterality Date    ABDOMINAL SURGERY      CARDIAC PACEMAKER PLACEMENT         Meds/Allergies:    Prior to Admission medications    Medication Sig Start Date End Date Taking? Authorizing Provider   clindamycin (CLEOCIN) 150 mg capsule Take 3 capsules (450 mg total) by mouth every 8 (eight) hours for 10 days 7/6/19 7/16/19  Brian Whittaker PA-C   erythromycin (ILOTYCIN) ophthalmic ointment Place a 1/2 inch ribbon of ointment into the lower eyelid   7/6/19   Brian Whittaker PA-C     I have reviewed home medications with a medical source (PCP, Pharmacy, other)  Allergies: No Known Allergies    Social History:     Marital Status: Single   Occupation: retired  Patient Pre-hospital Living Situation: lives with daughter  Patient Pre-hospital Level of Mobility: ambulatory with walker  Patient Pre-hospital Diet Restrictions: none  Substance Use History:   Social History     Substance and Sexual Activity   Alcohol Use Not Currently     Social History     Tobacco Use   Smoking Status Former Smoker   Smokeless Tobacco Never Used   Tobacco Comment    quit 40 years ago     Social History     Substance and Sexual Activity   Drug Use Not Currently       Family History:    non-contributory    Physical Exam:     Vitals:   Blood Pressure: 169/81 (07/16/19 0030)  Pulse: 69 (07/16/19 0030)  Temperature: 97 9 °F (36 6 °C) (07/15/19 2038)  Respirations: 16 (07/16/19 0030)  SpO2: 99 % (07/16/19 0030)    Physical Exam   Constitutional: He is oriented to person, place, and time  He appears well-developed and well-nourished  No distress  HENT:   Head: Normocephalic and atraumatic  Eyes:   Bilateral periorbital swelling and cellulitis  No eye drainage   Neck: Normal range of motion  Neck supple  Cardiovascular: Normal rate  irregular   Pulmonary/Chest: Effort normal and breath sounds normal    Abdominal: Soft  Bowel sounds are normal  He exhibits no distension  Musculoskeletal: Normal range of motion  He exhibits no edema or deformity  Neurological: He is alert and oriented to person, place, and time  Skin: Skin is warm and dry  He is not diaphoretic  Psychiatric:   anxious   Vitals reviewed  Additional Data:     Lab Results: I have personally reviewed pertinent reports        Results from last 7 days   Lab Units 07/15/19  2140   WBC Thousand/uL 5 88   HEMOGLOBIN g/dL 11 1*   HEMATOCRIT % 35 6*   PLATELETS Thousands/uL 205   NEUTROS PCT % 47   LYMPHS PCT % 33   MONOS PCT % 13*   EOS PCT % 6 Results from last 7 days   Lab Units 07/15/19  2140   SODIUM mmol/L 136   POTASSIUM mmol/L 5 1   CHLORIDE mmol/L 102   CO2 mmol/L 22   BUN mg/dL 67*   CREATININE mg/dL 3 72*   ANION GAP mmol/L 12   CALCIUM mg/dL 10 4*   ALBUMIN g/dL 3 9   TOTAL BILIRUBIN mg/dL 0 62   ALK PHOS U/L 84   ALT U/L 13   AST U/L 49*   GLUCOSE RANDOM mg/dL 93                       Imaging: I have personally reviewed pertinent reports  CT orbits/temporal bones/skull base wo contrast   Final Result by Ernesto Macias MD (07/15 2342)         Findings compatible with bilateral periorbital cellulitis  No retro-orbital inflammation or abscess  Workstation performed: [id number]             EKG, Pathology, and Other Studies Reviewed on Admission:   · EKG: none    Allscripts / Epic Records Reviewed: Yes     ** Please Note: This note has been constructed using a voice recognition system   **

## 2019-07-16 NOTE — ASSESSMENT & PLAN NOTE
Admit to med/surg  CT scan shows periorbital cellulitis  Started on rocephin and vanco in ED, will continue    Of note, patient was brought to ED by police  Patient tells staff that he is not being taken care of by his daughter at home and he "can't go back there"  He is requesting that his daughter who lives in [place] "sends for him"  Upon further questioning he states that he called the police because his daughter did not help him with his bath when he wanted her to  Daughter is not present in the ED  Will consult case management

## 2019-07-16 NOTE — CONSULTS
Consultation - Infectious Disease   Taisha Fay [de-identified] y o  male MRN: [id number]  Unit/Bed#: E4 -01 Encounter: [id number]      IMPRESSION & RECOMMENDATIONS:   1  Bilateral periorbital edema-based upon the chronicity of the problem, the clinical presentation, and the bilateral nature, I doubt the patient has a acute bacterial cellulitis  Perhaps this is all an allergic reaction  Perhaps the patient has a problem with his lacrimal  drainage system  Fortunately the patient is not systemically ill, has a normal WBC count, and is nontoxic     -discontinue vancomycin ceftriaxone  -monitor off all antibiotics  -consult ophthalmology  -no additional eye drops until ophthalmology has assess the patient  -serial exams  -check procalcitonin level now and tomorrow a m   -recheck CBC with diff and BMP    2  Acute kidney injury-complicating chronic kidney disease stage 4  Patient had been on short-term hemodialysis in the past   Acute worsening may be a pre renal issue or perhaps the patient is just progressing with his disease   -recheck BMP  -volume management  -nephrology follow-up    3  Ischemic cardiomyopathy and-with an ejection fraction of 20%  Was being followed by a cardiac transplant team at [facility]   -volume management  -holding diuresis for now    4  Atrial fibrillation-rate controlled and on anticoagulation     Discussed the above plan with the primary service    HISTORY OF PRESENT ILLNESS:  Reason for Consult:  Facial cellulitis  HPI: Taisha Fay is a [de-identified]y o  year old male with a history of chronic kidney disease, ischemic cardiomyopathy admitted to [facility] with persistent bilateral periorbital swelling itching and burning who I am asked to assist with antibiotic management  The patient has had a history of persistent/intermittent bilateral periorbital swelling  The symptoms began about a month ago    Two weeks ago the patient was seen in the emergency department for further evaluation  He was diagnosed with a possible periorbital cellulitis, and was started on clindamycin and erythromycin eyedrops  He apparently was also seen as an outpatient by his primary care physician who changed his eyedrops with apparent improvement of his swelling  The day prior to admission the patient apparently went to [facility] Emergency Department with a multitude of complaints and underwent extensive imaging including CT of the chest abdomen and pelvis, x-ray the abdomen, and ultrasound of the testicles  He apparently that time also complained of some periorbital edema and was started on new eyedrops with worsening  He was therefore brought to the [facility] for further evaluation  In the emergency department was apparently given some additional eyedrops, and started on vancomycin ceftriaxone admitted for further management  The patient denies having any fever chills or sweats, denies any nausea vomiting or diarrhea, denies any cough or shortness of breath, denies any dysuria or hematuria, denies any other new rash or skin lesions, denies any other new joint or muscle pains  The patient characterizes the symptoms of swelling the gets worse and gets better with some eye drops but worse with other eyedrops  He states that is mostly itching but there is a burning sensation that goes along with both eyes  He denies starting any new medications recently of any kind of than that noted in the record  REVIEW OF SYSTEMS:  A complete 12 point system-based review of systems is negative other than that noted in the HPI      PAST MEDICAL HISTORY:  Past Medical History:   Diagnosis Date    CHF (congestive heart failure) ([address] )     Renal disorder      Past Surgical History:   Procedure Laterality Date    ABDOMINAL SURGERY      CARDIAC PACEMAKER PLACEMENT         FAMILY HISTORY:  Non-contributory    SOCIAL HISTORY:  Social History   Social History     Substance and Sexual Activity   Alcohol Use Never    Frequency: Never    Drinks per session: Patient refused    Binge frequency: Patient refused     Social History     Substance and Sexual Activity   Drug Use Never     Social History     Tobacco Use   Smoking Status Former Smoker    Packs/day: 2 00    Years: 30 00    Pack years: 60 00    Last attempt to quit: Sohail Smith Years since quittin 5   Smokeless Tobacco Never Used   Tobacco Comment    quit 40 years ago       ALLERGIES:  No Known Allergies    MEDICATIONS:  All current active medications have been reviewed  Antibiotics:  Vancomycin ceftriaxone 2    PHYSICAL EXAM:  Temp:  [97 5 °F (36 4 °C)-98 5 °F (36 9 °C)] 97 5 °F (36 4 °C)  HR:  [66-79] 66  Resp:  [16-18] 18  BP: (120-180)/(64-91) 132/78  SpO2:  [98 %-99 %] 99 %  Temp (24hrs), Av °F (36 7 °C), Min:97 5 °F (36 4 °C), Max:98 5 °F (36 9 °C)  Current: Temperature: 97 5 °F (36 4 °C)    Intake/Output Summary (Last 24 hours) at 2019 1257  Last data filed at 2019 0900  Gross per 24 hour   Intake 480 ml   Output 300 ml   Net 180 ml       General Appearance:  Appearing well, nontoxic, and in no distress   Head:  Normocephalic, without obvious abnormality, atraumatic   Eyes:  Bilateral periorbital edema  Conjunctiva injected  No drainage  Extraocular muscles intact   Nose: Nares normal, mucosa normal, no drainage   Throat: Oropharynx moist without lesions   Neck: Supple, symmetrical, no adenopathy, no tenderness/mass/nodules   Back:   Symmetric, no curvature, ROM normal, no CVA tenderness   Lungs:   Clear to auscultation bilaterally, respirations unlabored   Chest Wall:  No tenderness or deformity   Heart:  Irregular; no murmur, rub or gallop   Abdomen:   Soft, non-tender, non-distended, positive bowel sounds    Extremities: No cyanosis, clubbing or edema   Skin: No rashes or lesions  No draining wounds noted     Lymph nodes: Cervical, supraclavicular nodes normal   Neurologic: Alert, answer simple questions appropriately, able to move all 4 extremities       LABS, IMAGING, & OTHER STUDIES:  Lab Results:  I have personally reviewed pertinent labs  Results from last 7 days   Lab Units 07/15/19  2140   WBC Thousand/uL 5 88   HEMOGLOBIN g/dL 11 1*   PLATELETS Thousands/uL 205     Results from last 7 days   Lab Units 07/15/19  2140   SODIUM mmol/L 136   POTASSIUM mmol/L 5 1   CHLORIDE mmol/L 102   CO2 mmol/L 22   BUN mg/dL 67*   CREATININE mg/dL 3 72*   EGFR ml/min/1 73sq m 14   CALCIUM mg/dL 10 4*   AST U/L 49*   ALT U/L 13   ALK PHOS U/L 84               Imaging Studies:   I have personally reviewed pertinent imaging study reports and images in PACS  CT orbits-findings consistent with bilateral preseptal inflammation    No retro orbital inflammation     Images personally reviewed by me in PACS

## 2019-07-16 NOTE — NURSING NOTE
Care of patient assumed from Rosendo Corona RN @ 1600  Pt's charting reviewed, in agreement with charted assessments  Pt reports pain in his L shoulder, but refused Tylenol/pain medication  Pt reported that the site where his pacemaker is "feels funny," but could not describe the sensation more accurately  Dr Jaguar Johnston made aware

## 2019-07-16 NOTE — APP STUDENT NOTE
[facility] Internal Medicine Progress Note  Patient: Evan Gant [de-identified] y o  male   MRN: [id number]  PCP: Luis Olguin PA-C  Unit/Bed#: E4 -20 Encounter: [id number]  Date Of Visit: 07/16/19    Assessment:    Principal Problem:    Facial cellulitis  Active Problems:    CHF (congestive heart failure) ([facility])    Renal disorder    GERD (gastroesophageal reflux disease)    A-fib ([facility])    HTN (hypertension)    SHAHIDA (acute kidney injury) ([facility] )    CKD (chronic kidney disease), stage IV ([facility] [address] )    Secondary hyperparathyroidism ([facility] )    Hypercalcemia      Plan:    1  Facial cellulitis  · Pt with worsening b/l eye redness and swelling  · CT shows bilateral periorbital cellulitis  Pt continued on vancomycin and rocephin  · Pending ID consult  · Continue with warm compresses/ice packs to b/l eyes for swelling  · No proptosis or pain with eye movements  Continue to monitor for signs of orbital cellulitis  2  Social issues at home  · Pt recently moved to this area from [place], [place] and lives with one of his daughters  · He has been telling multiple hospital staff members that he "can't go back there" following d/c due to lack of appropriate care  · Pending CM input  3  HTN  · BP stable  · Continue home meds - hydralazine, isosorbide and metoprolol  Pt also on flomax  · Continue to monitor  4  Afib  · Rate controlled with metoprolol  · No warfarin order placed yet - will order and check PT/INR    5  GERD  · Continue PPI    6  SHAHIDA superimposed on CKD stage IV  · Cr 3 72 on admission, previously 2 78  · Nephrology following - likely pre-renal  · Continue with gentle hydration, holding lasix and monitoring I&Os    · Pending UA, bladder scan  · [place] of [place] - he will need outpatient f/u locally pending d/c     7  CHF  · Managed through [facility] in [place], [place]  · Last echo completed there in 2/18/19, EF <30%  · Appears euvolemic   · Lasix held at admission 2/2 SHAHIDA  · Continue to monitor I&Os and daily weights    8  Hypercalcemia  · Ca 10 4 yesterday  · Nephrology following - D/c calcium carbonate  · Continue with IVF and recheck tomorrow    9  Secondary hyperparathyroidism  · Likely 2/2 CKD  · Nephrology following - will check PTH and phosphorus level      VTE Pharmacologic Prophylaxis:   Pharmacologic: Warfarin (Coumadin)  Mechanical VTE Prophylaxis in Place: Yes    Patient Centered Rounds: I have performed bedside rounds with nursing staff today  Discussions with Specialists or Other Care Team Provider: none    Education and Discussions with Family / Patient: patient    Time Spent for Care: 30 minutes  More than 50% of total time spent on counseling and coordination of care as described above  Current Length of Stay: 1 day(s)    Current Patient Status: Inpatient   Certification Statement: The patient will continue to require additional inpatient hospital stay due to periorbital cellulitis    Discharge Plan / Estimated Discharge Date: pending clinical course    Code Status: Level 1 - Full Code      Subjective:   [de-identified] yo M who is seen and examined on HD1 c/o persistent discomfort around his eyes  He describes the sensation as a "burning, stinging" around his eyes and under the skin of his forehead  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 and admits to worsening of his vision secondary to swelling around b/l eyes  He notes associated chills but denies any pain with eye movements, fever, eye drainage or eye crusting  Denies any additional CP, SOB, abdominal pain, N/V/D or leg pain/swelling  Objective:     Vitals:   Temp (24hrs), Av °F (36 7 °C), Min:97 5 °F (36 4 °C), Max:98 5 °F (36 9 °C)    Temp:  [97 5 °F (36 4 °C)-98 5 °F (36 9 °C)] 97 5 °F (36 4 °C)  HR:  [66-79] 66  Resp:  [16-18] 18  BP: (120-180)/(64-91) 132/78  SpO2:  [98 %-99 %] 99 %  Body mass index is 22 17 kg/m²  Input and Output Summary (last 24 hours):        Intake/Output Summary (Last 24 hours) at 7/16/2019 1210  Last data filed at 7/16/2019 0900  Gross per 24 hour   Intake 480 ml   Output 300 ml   Net 180 ml       Physical Exam:     Physical Exam   Constitutional: He is oriented to person, place, and time  He appears well-developed and well-nourished  No distress  Lying in bed in NAD   HENT:   Head: Normocephalic and atraumatic  Nose: Nose normal    Mouth/Throat: Oropharynx is clear and moist    Eyes: Right eye exhibits no discharge  Left eye exhibits no discharge  Moderate swelling of b/l upper and lower eye lids with slight erythema  Unable to determine EOM due to pt stating 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  Near vision intact  Unable to visualize pupils to determine reactivity/size  No proptosis or pain with eye movements  Neck: Normal range of motion  Neck supple  Cardiovascular: Normal rate, normal heart sounds and intact distal pulses  An irregularly irregular rhythm present  No murmur heard  Pulmonary/Chest: Effort normal and breath sounds normal  No respiratory distress  He has no wheezes  He has no rales  Abdominal: Soft  He exhibits no distension  There is no tenderness  Musculoskeletal: Normal range of motion  He exhibits no edema or tenderness  No b/l LE edema or calf tenderness  Lymphadenopathy:     He has no cervical adenopathy  Neurological: He is alert and oriented to person, place, and time  Skin: Skin is warm and dry  No rash noted  He is not diaphoretic  Psychiatric: He has a normal mood and affect  His behavior is normal    Nursing note and vitals reviewed          Additional Data:     Labs:    Results from last 7 days   Lab Units 07/15/19  2140   WBC Thousand/uL 5 88   HEMOGLOBIN g/dL 11 1*   HEMATOCRIT % 35 6*   PLATELETS Thousands/uL 205   NEUTROS PCT % 47   LYMPHS PCT % 33   MONOS PCT % 13*   EOS PCT % 6     Results from last 7 days   Lab Units 07/15/19  2140   POTASSIUM mmol/L 5 1   CHLORIDE mmol/L 102   CO2 mmol/L 22   BUN mg/dL 67* CREATININE mg/dL 3 72*   CALCIUM mg/dL 10 4*   ALK PHOS U/L 84   ALT U/L 13   AST U/L 49*           * I Have Reviewed All Lab Data Listed Above  * Additional Pertinent Lab Tests Reviewed:  [facility] Admission Reviewed    Imaging:    Imaging Reports Reviewed Today Include: all  Imaging Personally Reviewed by Myself Includes:  all    Recent Cultures (last 7 days):           Last 24 Hours Medication List:     Current Facility-Administered Medications:  acetaminophen 650 mg Oral Q6H PRN Geroldine Nigel, PA-C    atorvastatin 80 mg Oral Daily With ComJEFFERY quintana    [START ON 7/17/2019] cefTRIAXone 1,000 mg Intravenous Q24H Geroldine Venicele, PA-C    diphenhydrAMINE 25 mg Intravenous Q6H PRN Geroldine Pickle, PA-C    gabapentin 300 mg Oral Daily Geroldine Venicele, PA-C    hydrALAZINE 50 mg Oral Q8H [address] AUDREY Looney    isosorbide dinitrate 20 mg Oral TID after meals AUDREY Looney    [START ON 7/17/2019] metoprolol succinate 25 mg Oral Daily AUDREY Looney    ondansetron 4 mg Intravenous Q6H PRN Geroldine Nigel, PA-C    pantoprazole 40 mg Oral Early Morning Geroldine Nigel, PA-C    potassium chloride 10 mEq Oral Daily Geroldine Nigel, PA-C    sodium chloride 75 mL/hr Intravenous Continuous Geroldine Nigel, PA-C Last Rate: 75 mL/hr (07/16/19 0205)   tamsulosin 0 8 mg Oral Daily With Dinner GerEMANUEL Maxwell-C    [START ON 7/18/2019] vancomycin 20 mg/kg Intravenous Q48H Geroldine Nigel, PA-C         Today, Patient Was Seen By: KERRI You    7/16/2019  12:10 PM

## 2019-07-16 NOTE — PROGRESS NOTES
Case discussed with ophthalmology with recommendation for trial of systemic steroids for inflammatory/allergic reaction  Daughter also requesting neurology evaluation given cognitive decline

## 2019-07-16 NOTE — UTILIZATION REVIEW
Initial Clinical Review    Admission: Date/Time/Statement: 7/15/19 @ 2355     Orders Placed This Encounter   Procedures    Inpatient Admission (expected length of stay for this patient Order details is greater than two midnights)     Standing Status:   Standing     Number of Occurrences:   1     Order Specific Question:   Admitting Physician     Answer:   Lissette Mcgraw     Order Specific Question:   Level of Care     Answer:   Med Surg [16]     Order Specific Question:   Estimated length of stay     Answer:   More than 2 Midnights     Order Specific Question:   Certification     Answer:   I certify that inpatient services are medically necessary for this patient for a duration of greater than two midnights  See H&P and MD Progress Notes for additional information about the patient's course of treatment  ED Arrival Information     Expected Arrival Acuity Means of Arrival Escorted By Service Admission Type    - 7/15/2019 20:29 Urgent Wheelchair [employer] Hospitalist Urgent    Arrival Complaint    -        Chief Complaint   Patient presents with    Eye Pain     patient c/o eye burning, evaluted yesterday for same with negative findings   Medical Problem     patient reports his care is being neglected at home  Assessment/Plan: [de-identified] yo male brought in to ED by Police from home with bilateral eye itching and swelling  Patient was noted to have facial swelling  He was seen in [address] [place] [address] ED yesterday for multiple complaints  He was also seen here in the ED earlier this month for eye itching and and was started on oral clindamycin and eye drops    Patient states his daughter isn't caring for him at home and he can't go back there  Admitted to IP with:    Facial cellulitis  Admit to med/surg  CT scan shows periorbital cellulitis  Started on rocephin and vanco in ED, will continue  Of note, patient was brought to ED by police   Patient tells staff that he is not being taken care of by his daughter at home and he "can't go back there"  He is requesting that his daughter who lives in [place] "sends for him"  Upon further questioning he states that he called the police because his daughter did not help him with his bath when he wanted her to  Daughter is not present in the ED  Will consult case management       HTN (hypertension)  Continue home meds  A-fib (HCC)  Rate controlled  Continue coumadin  Check PT/INR  GERD (gastroesophageal reflux disease)  Continue PPI  Renal disorder  Creatinine today 3 72, previously 2 78  Order gentle IV fluids  Monitor closely for signs of fluid overload     CHF (congestive heart failure) (HCC)  Assessment & Plan      Wt Readings from Last 3 Encounters:   07/05/19 63 kg (139 lb)      On lasix at home  Will hold now due to SHAHIDA    Anticipated Length of Stay:  Patient will be admitted on an Inpatient basis with an anticipated length of stay of  Greater than 2 midnights  Justification for Hospital Stay: patient requires IV antibiotics     7/16: Per Nephrology: SHAHIDA - presented with creatinine of 3 7  -baseline creatinine per records appears to be mid 2s to low 3's  Required short term hemodialysis in the past   Hold Diuretics  Will continue IV fluid but will stop in about 12 hours for a total of about 1 more L given his cardiomyopathy  Monitor for reinitiate in diuretics tomorrow    Per ID:  Bilateral periorbital edema-doubt the patient has a acute bacterial cellulitis  Perhaps this is an allergic reaction / problem with his lacrimal  drainage system  discontinue vancomycin ceftriaxone -monitor off all antibiotics -consult ophthalmology   no additional eye drops until ophthalmology has assess the patient    ED Triage Vitals   Temperature Pulse Respirations Blood Pressure SpO2   07/15/19 2038 07/15/19 2033 07/15/19 2033 07/15/19 2038 07/15/19 2033   97 9 °F (36 6 °C) 79 16 125/72 98 %      Temp Source Heart Rate Source Patient Position - Orthostatic VS BP Location FiO2 (%)   07/16/19 0050 07/16/19 0030 07/15/19 2038 07/15/19 2038 --   Temporal Monitor Sitting Right arm       Pain Score       07/16/19 0050       7        Wt Readings from Last 1 Encounters:   07/16/19 64 2 kg (141 lb 8 6 oz)       Additional Vital Signs:   07/16/19 0700  97 5 °F (36 4 °C)  66  18  132/78  99 %  None (Room air)  Lying   07/16/19 0050  98 5 °F (36 9 °C)  74  18  180/91  99 %    Lying   07/16/19 0030    69  16  169/81  99 %  None (Room air)  Lying   07/15/19 2038  97 9 °F (36 6 °C)      125/72      Sitting       Pertinent Labs/Diagnostic Test Results:   Results from last 7 days   Lab Units 07/15/19  2140   WBC Thousand/uL 5 88   HEMOGLOBIN g/dL 11 1*   HEMATOCRIT % 35 6*   PLATELETS Thousands/uL 205   NEUTROS ABS Thousands/µL 2 75     Results from last 7 days   Lab Units 07/15/19  2140   SODIUM mmol/L 136   POTASSIUM mmol/L 5 1   CHLORIDE mmol/L 102   CO2 mmol/L 22   ANION GAP mmol/L 12   BUN mg/dL 67*   CREATININE mg/dL 3 72*   EGFR ml/min/1 73sq m 14   CALCIUM mg/dL 10 4*     Results from last 7 days   Lab Units 07/15/19  2140   AST U/L 49*   ALT U/L 13   ALK PHOS U/L 84   TOTAL PROTEIN g/dL 8 5*   ALBUMIN g/dL 3 9   TOTAL BILIRUBIN mg/dL 0 62     Results from last 7 days   Lab Units 07/15/19  2140   GLUCOSE RANDOM mg/dL 93     CT Orbits/temporal bones:  Findings compatible with bilateral periorbital cellulitis  No retro-orbital inflammation or abscess      ED Treatment:   Medication Administration from 07/15/2019 2029 to 07/16/2019 0037       Date/Time Order Dose Route Action     07/16/2019 0012 ketotifen (ZADITOR) 0 025 % ophthalmic solution 1 drop 1 drop Both Eyes Given     07/16/2019 0011 ceftriaxone (ROCEPHIN) 1 g/50 mL in dextrose IVPB 1,000 mg Intravenous New Bag        Past Medical History:   Diagnosis Date    CHF (congestive heart failure) (HCC)     Renal disorder      Present on Admission:  **None**    Admitting Diagnosis: Eye pain [H57 10]  Pacemaker [Z95 0]  Family discord [Z63 8]  Cellulitis of periorbital region of both eyes [L03 213]  Stage 5 chronic kidney disease not on chronic dialysis ([facility] ) [N18 5]  Hypertension, unspecified type [I10]  Age/Sex: [de-identified] y o  male     Admission Orders:  Current Facility-Administered Medications:  acetaminophen 650 mg Oral Q6H PRN    atorvastatin 80 mg Oral Daily With Dinner    diphenhydrAMINE 25 mg Intravenous Q6H PRN  IV x 1 716   gabapentin 300 mg Oral Daily    hydrALAZINE 50 mg Oral Q8H [facility]    isosorbide dinitrate 20 mg Oral TID after meals    [START ON 7/17/2019] metoprolol succinate 25 mg Oral Daily    ondansetron 4 mg Intravenous Q6H PRN    pantoprazole 40 mg Oral Early Morning    potassium chloride 10 mEq Oral Daily    sodium chloride 75 mL/hr Intravenous Continuous    tamsulosin 0 8 mg Oral Daily With Dinner      IP CONSULT TO INFECTIOUS DISEASES  IP CONSULT TO NEPHROLOGY  IP CONSULT TO OPHTHALMOLOGY  [facility]      Network Utilization Review Department  Phone: [phone]; Fax [phone]  [email or web address]  ATTENTION: Please call with any questions or concerns to [phone]  and carefully listen to the prompts so that you are directed to the right person  Send all requests for admission clinical reviews, approved or denied determinations and any other requests to fax [phone]   All voicemails are confidential

## 2019-07-16 NOTE — CONSULTS
[de-identified] yo male with history of glaucoma, possible history of glaucoma surgery in the right eye, who presents with bilateral periorbital edema of several days duration  His daughter states he say an "eye dr" in [place] (optom vs ophtho?) about two months ago and two glaucoma drops were started in both eyes  Patient developed periorbital edema and discomfort OU, and at the follow up visit one of the drops was changed due to a presumed allergy  Names of the drops are unknown  Daughter states the drops have not been used consistently in the last week  Patient has no vision in the right eye for an uncertain period of time, and may have had a glaucoma procedure in the past   Vision in the left eye is stable  VA LP OD, 20/100 OS  P NR OD, 4-2 OS, +APD OD  T NTP OU  EOMS full OU  PLE  L/l/l 3+edema/periorbital dermatitis OU  C/s white/quiet ou  k clear ou  Ac formed ou  I/p wnl ou  l moderate cataract ou  DFE deferred at this time due to uncertain glaucoma status ?narrow angle    A/p  Periorbital edema/erythema most consistent with allergic/contact dermatitis  Recommend discontinuing all eye drops, with the exception of preservative free artificial tears for comfort  Cool compresses for comfort  Recommend close f/u with glaucoma specialist for continued management, as patient has history of elevated IOP and likely end stage glaucoma OD  Consider systemic steroids to improve periorbital edema

## 2019-07-17 PROBLEM — I50.22 CHRONIC SYSTOLIC CONGESTIVE HEART FAILURE (HCC): Status: ACTIVE | Noted: 2019-07-16

## 2019-07-17 PROBLEM — E87.5 HYPERKALEMIA: Status: ACTIVE | Noted: 2019-07-17

## 2019-07-17 PROBLEM — N40.0 BPH (BENIGN PROSTATIC HYPERPLASIA): Status: ACTIVE | Noted: 2019-07-17

## 2019-07-17 PROBLEM — I73.9 PAD (PERIPHERAL ARTERY DISEASE) (HCC): Status: ACTIVE | Noted: 2019-07-17

## 2019-07-17 PROBLEM — I48.0 PAROXYSMAL ATRIAL FIBRILLATION (HCC): Status: ACTIVE | Noted: 2019-07-16

## 2019-07-17 PROBLEM — R41.81 AGE-RELATED COGNITIVE DECLINE: Status: ACTIVE | Noted: 2019-07-17

## 2019-07-17 PROBLEM — N18.4 ACUTE RENAL FAILURE SUPERIMPOSED ON STAGE 4 CHRONIC KIDNEY DISEASE (HCC): Status: ACTIVE | Noted: 2019-07-16

## 2019-07-17 PROBLEM — R60.0 PERIORBITAL EDEMA: Status: ACTIVE | Noted: 2019-07-16

## 2019-07-17 LAB
ANION GAP SERPL CALCULATED.3IONS-SCNC: 11 MMOL/L (ref 4–13)
BUN SERPL-MCNC: 63 MG/DL (ref 5–25)
CALCIUM SERPL-MCNC: 9.6 MG/DL (ref 8.3–10.1)
CHLORIDE SERPL-SCNC: 107 MMOL/L (ref 100–108)
CO2 SERPL-SCNC: 22 MMOL/L (ref 21–32)
CREAT SERPL-MCNC: 3.33 MG/DL (ref 0.6–1.3)
ERYTHROCYTE [DISTWIDTH] IN BLOOD BY AUTOMATED COUNT: 12.9 % (ref 11.6–15.1)
GFR SERPL CREATININE-BSD FRML MDRD: 17 ML/MIN/1.73SQ M
GLUCOSE SERPL-MCNC: 107 MG/DL (ref 65–140)
HCT VFR BLD AUTO: 31.1 % (ref 36.5–49.3)
HGB BLD-MCNC: 9.8 G/DL (ref 12–17)
INR PPP: 1.12 (ref 0.84–1.19)
MCH RBC QN AUTO: 32.3 PG (ref 26.8–34.3)
MCHC RBC AUTO-ENTMCNC: 31.5 G/DL (ref 31.4–37.4)
MCV RBC AUTO: 103 FL (ref 82–98)
PHOSPHATE SERPL-MCNC: 4.5 MG/DL (ref 2.3–4.1)
PLATELET # BLD AUTO: 182 THOUSANDS/UL (ref 149–390)
PMV BLD AUTO: 9.7 FL (ref 8.9–12.7)
POTASSIUM SERPL-SCNC: 4.3 MMOL/L (ref 3.5–5.3)
PROCALCITONIN SERPL-MCNC: 0.1 NG/ML
PROTHROMBIN TIME: 14.6 SECONDS (ref 11.6–14.5)
PTH-INTACT SERPL-MCNC: 150.7 PG/ML (ref 18.4–80.1)
RBC # BLD AUTO: 3.03 MILLION/UL (ref 3.88–5.62)
SODIUM SERPL-SCNC: 140 MMOL/L (ref 136–145)
WBC # BLD AUTO: 4.19 THOUSAND/UL (ref 4.31–10.16)

## 2019-07-17 PROCEDURE — G8978 MOBILITY CURRENT STATUS: HCPCS | Performed by: PHYSICAL THERAPIST

## 2019-07-17 PROCEDURE — 99232 SBSQ HOSP IP/OBS MODERATE 35: CPT | Performed by: INTERNAL MEDICINE

## 2019-07-17 PROCEDURE — G8988 SELF CARE GOAL STATUS: HCPCS

## 2019-07-17 PROCEDURE — G8979 MOBILITY GOAL STATUS: HCPCS | Performed by: PHYSICAL THERAPIST

## 2019-07-17 PROCEDURE — G8987 SELF CARE CURRENT STATUS: HCPCS

## 2019-07-17 PROCEDURE — 97116 GAIT TRAINING THERAPY: CPT | Performed by: PHYSICAL THERAPIST

## 2019-07-17 PROCEDURE — 85027 COMPLETE CBC AUTOMATED: CPT | Performed by: INTERNAL MEDICINE

## 2019-07-17 PROCEDURE — 84145 PROCALCITONIN (PCT): CPT | Performed by: INTERNAL MEDICINE

## 2019-07-17 PROCEDURE — 85610 PROTHROMBIN TIME: CPT | Performed by: INTERNAL MEDICINE

## 2019-07-17 PROCEDURE — 97162 PT EVAL MOD COMPLEX 30 MIN: CPT | Performed by: PHYSICAL THERAPIST

## 2019-07-17 PROCEDURE — 97166 OT EVAL MOD COMPLEX 45 MIN: CPT

## 2019-07-17 PROCEDURE — 83970 ASSAY OF PARATHORMONE: CPT | Performed by: NURSE PRACTITIONER

## 2019-07-17 PROCEDURE — 80048 BASIC METABOLIC PNL TOTAL CA: CPT | Performed by: INTERNAL MEDICINE

## 2019-07-17 PROCEDURE — 84100 ASSAY OF PHOSPHORUS: CPT | Performed by: NURSE PRACTITIONER

## 2019-07-17 RX ADMIN — WARFARIN SODIUM 3 MG: 3 TABLET ORAL at 17:37

## 2019-07-17 RX ADMIN — METHYLPREDNISOLONE SODIUM SUCCINATE 20 MG: 40 INJECTION, POWDER, FOR SOLUTION INTRAMUSCULAR; INTRAVENOUS at 10:07

## 2019-07-17 RX ADMIN — HYDRALAZINE HYDROCHLORIDE 50 MG: 25 TABLET ORAL at 14:05

## 2019-07-17 RX ADMIN — PANTOPRAZOLE SODIUM 40 MG: 40 TABLET, DELAYED RELEASE ORAL at 05:33

## 2019-07-17 RX ADMIN — TAMSULOSIN HYDROCHLORIDE 0.8 MG: 0.4 CAPSULE ORAL at 16:05

## 2019-07-17 RX ADMIN — ATORVASTATIN CALCIUM 80 MG: 80 TABLET, FILM COATED ORAL at 16:05

## 2019-07-17 RX ADMIN — GABAPENTIN 300 MG: 300 CAPSULE ORAL at 10:07

## 2019-07-17 RX ADMIN — DIPHENHYDRAMINE HYDROCHLORIDE 25 MG: 50 INJECTION, SOLUTION INTRAMUSCULAR; INTRAVENOUS at 22:12

## 2019-07-17 RX ADMIN — ACETAMINOPHEN 650 MG: 325 TABLET ORAL at 21:17

## 2019-07-17 RX ADMIN — ISOSORBIDE DINITRATE 20 MG: 10 TABLET ORAL at 12:23

## 2019-07-17 RX ADMIN — ISOSORBIDE DINITRATE 20 MG: 10 TABLET ORAL at 10:07

## 2019-07-17 RX ADMIN — METHYLPREDNISOLONE SODIUM SUCCINATE 20 MG: 40 INJECTION, POWDER, FOR SOLUTION INTRAMUSCULAR; INTRAVENOUS at 21:11

## 2019-07-17 RX ADMIN — METOPROLOL SUCCINATE 25 MG: 25 TABLET, EXTENDED RELEASE ORAL at 10:07

## 2019-07-17 NOTE — PROGRESS NOTES
NEPHROLOGY PROGRESS NOTE   Sheri Ambrocio [de-identified] y o  male MRN: [id number]  Unit/Bed#: E4 -01 Encounter: [id number]  Reason for Consult: SHAHIDA on CKD    ASSESSMENT/PLAN:  [address]  Sheri Ambrocio [de-identified] y o  male MRN: [id number]  2019        ASSESSMENT and PLAN:    I had the pleasure of seeing Sheri Ambrocio today in the hospital for discussion of the patient's goals of care  Our discussion today will focus on helping Mr Trice George and his family achieve their desired goals, long term goals of care and how best to achieve those goals  The participants during this visit include Mr Trice George and myself  Number of Hospitalizations: 6 ED visits this year   Would not wish to Pursue Dialysis if Needed  Five Wishes Packet: will provide at follow up appointment  Code Status: Full code  Length/Time of Meetin minutes  Outpatient Nephrologist: will establish care with our office at discharge  Discussion and Plan:  Mr Mancuso and I discussed his goals of care today  He states that he wishes to remain a full code and receive CPR/intubation if needed  He wishes to receive tube feeding if unable to take p o  Intake  He however has decided that he would not wish to pursue hemodialysis if his renal function worsens and his kidneys fail  He states " I would like everything done to save my life but I would not want to go on dialysis  I was on it one time before and it is not something I would be willing to do again"  NEPHROLOGY PROGRESS NOTE   Sheri Ambrocio [de-identified] y o  male MRN: [id number]  Unit/Bed#: E4 -01 Encounter: [id number]  Reason for Consult: SHAHIDA on CKD    ASSESSMENT/PLAN:  Acute kidney injury (POA): likely pre-renal, concern for over-diuresis  - presented with creatinine of 3 7    - creatinine likely back at baseline  - now off of IVF  -baseline creatinine per records appears to be mid 2s to low 3's     - will likely restart on lower dose diuretic in the next 24-48 hours    -recent CT:  Negative for hydro  - UA: needs to be collected   -bladder scan: negative  -I/O  -Gabapentin is renally dosed  -continue to monitor renal indices      CKD stage 4:  Previously followed with nephrologist in [place], [place], unsure of name of physician  Required short term hemodialysis in the past   Etiology of chronic disease likely secondary to hypertension, age related nephron loss, +/- cardiorenal syndrome   -will need outpatient follow-up locally  - would not wish to pursue RRT if renal function worsens or kidneys were to fail       Essential hypertension:  Blood pressure is fluctuating    -continue hydralazine 50 mg every 8 hours, isosorbide 20 mg 3 times a day, and metoprolol 25 mg daily with holding parameters for systolic blood pressure less than 130  -on Flomax       Ischemic cardiomyopathy/ CHF:  With ejection fraction of 20%  Weight is increased today  Previously following with cardiac transplant team [facility] [place]    -daily weight, I/O   -diuretic remains on hold, likely restart on lower dose in the near future    -1 5 L per day fluid restriction      Periorbital edema: as seen on CT scan  Improving    -continues on steroids  -infectious Disease following      Secondary hyperparathyroidism:  -phos level is      Hypercalcemia: Resolved  -calcium carbonate discontinued  -will continue to monitor  SUBJECTIVE:  The patient is resting in his bedside chair  He currently denies any complaints  He states that his eyes feel better today  He denies chest pain or shortness of breath  He denies nausea, vomiting, or diarrhea  He states he is eating and drinking well  He denies issues with urination      OBJECTIVE:  Current Weight: Weight - Scale: 65 kg (143 lb 4 8 oz)  Vitals:    07/16/19 2313 07/17/19 0531 07/17/19 0542 07/17/19 0702   BP: 105/54 117/60  132/61   BP Location: Left arm   Left arm   Pulse: 65   69   Resp: 18   18   Temp: 98 °F (36 7 °C) 98 3 °F (36 8 °C)   TempSrc: Temporal   Tympanic   SpO2: 99%   97%   Weight:   65 kg (143 lb 4 8 oz)    Height:           Intake/Output Summary (Last 24 hours) at 7/17/2019 1058  Last data filed at 7/17/2019 0912  Gross per 24 hour   Intake 2530 ml   Output 750 ml   Net 1780 ml     General: No apparent distress  Skin: warm, dry, intact, no rash  HEENT: Moist mucous membranes, sclera anicteric, normocephalic  Neck: No apparent JVD appreciated  Chest: Lung sounds clear B/L, on RA  Heart: Regular rate and rhythm, No murmer  Abdomen: Soft, round, NT, +BS  Extremities: No B/L LE edema present  Neuro: Alert and oriented  Psych: Appropriate mood and affect    Medications:    Current Facility-Administered Medications:     acetaminophen (TYLENOL) tablet 650 mg, 650 mg, Oral, Q6H PRN, Tim Granados PA-C    atorvastatin (LIPITOR) tablet 80 mg, 80 mg, Oral, Daily With Caden Todd PA-C, 80 mg at 07/16/19 1659    diphenhydrAMINE (BENADRYL) injection 25 mg, 25 mg, Intravenous, Q6H PRN, Tim Granados PA-C, 25 mg at 07/16/19 0205    gabapentin (NEURONTIN) capsule 300 mg, 300 mg, Oral, Daily, Tim Granados PA-C, 300 mg at 07/17/19 1007    hydrALAZINE (APRESOLINE) tablet 50 mg, 50 mg, Oral, Q8H [address], AUDREY Chapman, 50 mg at 07/16/19 1413    isosorbide dinitrate (ISORDIL) tablet 20 mg, 20 mg, Oral, TID after meals, AUDREY Chapman, 20 mg at 07/17/19 1007    methylPREDNISolone sodium succinate (Solu-MEDROL) injection 20 mg, 20 mg, Intravenous, Q12H [address], Moise Mooney DO, 20 mg at 07/17/19 1007    metoprolol succinate (TOPROL-XL) 24 hr tablet 25 mg, 25 mg, Oral, Daily, AUDREY Chapman, 25 mg at 07/17/19 1007    ondansetron (ZOFRAN) injection 4 mg, 4 mg, Intravenous, Q6H PRN, Tim Granados PA-C    pantoprazole (PROTONIX) EC tablet 40 mg, 40 mg, Oral, Early Morning, Tim Granados PA-C, 40 mg at 07/17/19 0533    tamsulosin (FLOMAX) capsule 0 8 mg, 0 8 mg, Oral, Daily With [employer] JEFFERY Lopez, 0 8 mg at 07/16/19 1659    warfarin (COUMADIN) tablet 3 mg, 3 mg, Oral, Daily (warfarin), Case Ovalle DO, 3 mg at 07/16/19 1708    Laboratory Results:  Results from last 7 days   Lab Units 07/17/19  0432 07/15/19  2140   WBC Thousand/uL 4 19* 5 88   HEMOGLOBIN g/dL 9 8* 11 1*   HEMATOCRIT % 31 1* 35 6*   PLATELETS Thousands/uL 182 205   POTASSIUM mmol/L 4 3 5 1   CHLORIDE mmol/L 107 102   CO2 mmol/L 22 22   BUN mg/dL 63* 67*   CREATININE mg/dL 3 33* 3 72*   CALCIUM mg/dL 9 6 10 4*   PHOSPHORUS mg/dL 4 5*  --

## 2019-07-17 NOTE — ASSESSMENT & PLAN NOTE
Ngoc orbital edema of both eyes  Seen by infectious disease and ophthalmology  Does not appear infectious, improving with IV steroids for allergic/contact dermatitis

## 2019-07-17 NOTE — PROGRESS NOTES
Progress Note Wayne Cavazos 1940, [de-identified] y o  male MRN: [id number]    Unit/Bed#: E4 -01 Encounter: [id number]    Primary Care Provider: Jorge Youssef PA-C   Date and time admitted to hospital: 7/15/2019  8:30 PM        * Periorbital edema  Assessment & Plan  Ngoc orbital edema of both eyes  Seen by infectious disease and ophthalmology  Does not appear infectious, improving with IV steroids for allergic/contact dermatitis  Age-related cognitive decline  Assessment & Plan  Cognitive decline  Discussed with neurology who recommends outpatient neurocognitive testing    Hyperkalemia  Assessment & Plan  Hyperkalemia discontinue standing potassium 10 mEq daily    PAD (peripheral artery disease) (HCC)  Assessment & Plan  PA D status post right lower extremity stenting  Continue atorvastatin and anticoagulation  BPH (benign prostatic hyperplasia)  Assessment & Plan  BPH continue tamsulosin    Acute renal failure superimposed on stage 4 chronic kidney disease ([facility] [address] )  Assessment & Plan  SHAHIDA on CKD 4; improved with holding diuretics and IVF per nephrology yesterday     Results from last 7 days   Lab Units 07/17/19  0432 07/15/19  2140   BUN mg/dL 63* 67*   CREATININE mg/dL 3 33* 3 72*   EGFR ml/min/1 73sq m 17 14       HTN (hypertension)  Assessment & Plan  Essential hypertension on metoprolol and hydralazine    Paroxysmal atrial fibrillation (HCC)  Assessment & Plan  Paroxysmal atrial fibrillation on metoprolol  Anticoagulated with warfarin    Results from last 7 days   Lab Units 07/17/19  0432   INR  1 12       GERD (gastroesophageal reflux disease)  Assessment & Plan  GERD continue pantoprazole    Chronic systolic congestive heart failure [facility])  Assessment & Plan  Wt Readings from Last 3 Encounters:   07/17/19 65 kg (143 lb 4 8 oz)   07/05/19 63 kg (139 lb)     Chronic systolic CHF last LVEF 99%  Holding diuretics given kidney injury    Consider restarting today or tomorrow      VTE Pharmacologic Prophylaxis: Warfarin (Coumadin)    Patient Centered Rounds: I have performed bedside rounds with nursing staff today  Discussions with Specialists or Other Care Team Provider:   Education and Discussions with Family / Patient:  Daughter    Time Spent for Care: 30 mins  More than 50% of total time spent on counseling and coordination of care as described above  Current Length of Stay: 2 day(s)  Current Patient Status: Inpatient     Certification Statement: The patient will continue to require additional inpatient hospital stay due to Facial cellulitis  Discharge Plan / Estimated Discharge Date:  Possibly next 24 hours if eyes continue to improve    Code Status: Level 1 - Full Code  ______________________________________________________________________________    Subjective:   Patient seen and examined  No new complaints  Eyes less swollen but still having foreign body sensation around eyes    Objective:   Vitals: Blood pressure 132/61, pulse 69, temperature 98 3 °F (36 8 °C), temperature source Tympanic, resp  rate 18, height 5' 7" (1 702 m), weight 65 kg (143 lb 4 8 oz), SpO2 97 %      Physical Exam:   General appearance: alert, appears stated age and cooperative  Head: Improved periorbital edema bilaterally  Lungs: clear to auscultation bilaterally  Heart: regular rate and rhythm  Abdomen: soft, non-tender, positive bowel sounds   Back: negative  Extremities: edema +1 lower extremities  Neurologic: Grossly normal    Additional Data:   Labs:  Results from last 7 days   Lab Units 07/17/19  0432 07/15/19  2140   WBC Thousand/uL 4 19* 5 88   HEMOGLOBIN g/dL 9 8* 11 1*   HEMATOCRIT % 31 1* 35 6*   MCV fL 103* 104*   PLATELETS Thousands/uL 182 205   INR  1 12  --      Results from last 7 days   Lab Units 07/17/19  0432 07/15/19  2140   SODIUM mmol/L 140 136   POTASSIUM mmol/L 4 3 5 1   CHLORIDE mmol/L 107 102   CO2 mmol/L 22 22   ANION GAP mmol/L 11 12   BUN mg/dL 63* 67*   CREATININE mg/dL 3 33* 3 72*   CALCIUM mg/dL 9 6 10 4*   ALBUMIN g/dL  --  3 9   TOTAL BILIRUBIN mg/dL  --  0 62   ALK PHOS U/L  --  84   ALT U/L  --  13   AST U/L  --  49*   EGFR ml/min/1 73sq m 17 14   GLUCOSE RANDOM mg/dL 107 93     Results from last 7 days   Lab Units 07/17/19  0432   PHOSPHORUS mg/dL 4 5*                              * I Have Reviewed All Lab Data Listed Above  Cultures:           Imaging:  Imaging Reports Reviewed Today Include:   Procedure: Ct Orbits/temporal Bones/skull Base Wo Contrast    Result Date: 7/15/2019  Narrative: CT ORBITS WITHOUT CONTRAST INDICATION:   Bilateral eye burning and itching  COMPARISON: None TECHNIQUE: Axial CT imaging through the orbits was performed  In addition, sagittal and coronal reformatted images were submitted for interpretation  Radiation dose length product (DLP) for this visit:  232 mGy-cm   This examination, like all CT scans performed in the [facility], was performed utilizing techniques to minimize radiation dose exposure, including the use of iterative reconstruction and automated exposure control  IMAGE QUALITY: Diagnostic  FINDINGS: ORBITS: Bilateral orbital preseptal soft tissue thickening  Intact globes  No retro-orbital inflammation or fluid collection  No proptosis  Intact extraocular muscles  SINUSES: Trace mucosal thickening in the ethmoid air cells and maxillary sinuses  Minimal debris in the left sphenoid sinus  SOFT TISSUES: Bilateral preseptal soft tissue swelling  BONY STRUCTURES: Well-pneumatized and clear mastoid air cells and middle ears  Impression: Findings compatible with bilateral periorbital cellulitis  No retro-orbital inflammation or abscess   Workstation performed: [id number]     Scheduled Meds:  Current Facility-Administered Medications:  acetaminophen 650 mg Oral Q6H PRN Edward Olivares PA-C   atorvastatin 80 mg Oral Daily With ComJEFFERY quintana   diphenhydrAMINE 25 mg Intravenous Q6H PRN Edward Olivares PA-C gabapentin 300 mg Oral Daily [facility], PA-C   hydrALAZINE 50 mg Oral Q8H [address] AUDREY Bernal   isosorbide dinitrate 20 mg Oral TID after meals AUDREY Bernal   methylPREDNISolone sodium succinate 20 mg Intravenous Q12H [address] Moise Mooney DO   metoprolol succinate 25 mg Oral Daily AUDREY Bernal   ondansetron 4 mg Intravenous Q6H PRN [facility], PA-C   pantoprazole 40 mg Oral Early Morning [facility], PA-C   potassium chloride 10 mEq Oral Daily [facility], PA-C   tamsulosin 0 8 mg Oral Daily With [employer], PA-C   warfarin 3 mg Oral Daily (warfarin) DO Ra Henry DO [address] Internal Medicine  Hospitalist    ** Please Note: This note has been constructed using a voice recognition system   **

## 2019-07-17 NOTE — CASE MANAGEMENT
This CM reviewed the [facility] letter with the patient   Patient asked for his daughter to be contacted, and the assigned  for the patient was informed of the patient's request

## 2019-07-17 NOTE — PLAN OF CARE
Problem: OCCUPATIONAL THERAPY ADULT  Goal: Performs self-care activities at highest level of function for planned discharge setting  See evaluation for individualized goals  Description  Treatment Interventions: ADL retraining, Functional transfer training, Endurance training, UE strengthening/ROM, Cognitive reorientation, Patient/family training, Equipment evaluation/education, Compensatory technique education, Energy conservation, Activityengagement          See flowsheet documentation for full assessment, interventions and recommendations  Note:   Limitation: Decreased UE strength, Decreased ADL status, Decreased Safe judgement during ADL, Decreased cognition, Decreased endurance, Decreased self-care trans, Decreased high-level ADLs, Decreased sensation, Visual deficit  Prognosis: Fair, Good  Assessment: Pt is a [de-identified] y o  male seen for OT evaluation s/p admit to [facility] on 7/15/2019 w/ Periorbital edema  Comorbidities affecting pt's functional performance at time of assessment include: CHF, renal disorder, PAD, HTN, SHAHIDA superimposed on CKD IV, hyperkalemia, age related cognitive decline  Personal factors affecting pt at time of IE include: poor historian, unsure of accuracy of information  Prior to admission, pt was living w/ daughter and reports independent w/ ADLs except assist w/ showers, independent w/ functional transfers and mobility w/ rollator, assist IADLs, assist transportation  Upon evaluation: Pt requires MIN assist sit<>Stand w/ VCs for hand placement and positioning, MIN assist functional mobility w/ Rollator w/ unsteadiness at times, MIN-MOD assist LB ADLs, MIN assist UB ADLs, MOd assist toileting 2* the following deficits impacting occupational performance: decreased strength and endurance, impaired balance, impaired functional reach, increased pain, impaired vision (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), decreased safety awareness and increased time to respond, [place]   Pt to benefit from continued skilled OT tx while in the hospital to address deficits as defined above and maximize level of functional independence w ADL's and functional mobility  Occupational Performance areas to address include: grooming, bathing/shower, toilet hygiene, dressing, health maintenance, functional mobility and clothing management, formal cognitive assessment   From OT standpoint, recommendation at time of d/c would be short term rehab vs  HOME w/ HOME OT and assist from family if able to provide increased assist      OT Discharge Recommendation: Short Term Rehab  OT - OK to Discharge: (to rehab when medically stable)

## 2019-07-17 NOTE — PROGRESS NOTES
Progress Note - Infectious Disease   Coralyn Bria [de-identified] y o  male MRN: [id number]  Unit/Bed#: E4 -01 Encounter: [id number]      Impression/Plan:  1  Bilateral periorbital edema-based upon the chronicity of the problem, the clinical presentation, and the bilateral nature, I doubt the patient has a acute bacterial cellulitis  Appears to be secondary to an allergic or contact reaction  Fortunately the patient is not systemically ill, has a normal WBC count, and is nontoxic     -monitor off all antibiotics  -ophthalmology follow-up  -systemic steroids  -serial exams as per Ophthalmology in the primary service  -no additional ID workup for now     2  Acute kidney injury-complicating chronic kidney disease stage 4  Patient had been on short-term hemodialysis in the past   Patient's renal function has improved since yesterday  -recheck BMP  -volume management  -nephrology follow-up     3  Ischemic cardiomyopathy and-with an ejection fraction of 20%  Was being followed by a cardiac transplant team at [facility]   -volume management  -holding diuresis for now     4  Atrial fibrillation-rate controlled and on anticoagulation     Antibiotics:  None    Subjective:  Patient has no fever, chills, sweats; no nausea, vomiting, diarrhea; no cough, shortness of breath; no pain  No new symptoms  Decreased eye swelling  Objective:  Vitals:  Temp:  [97 5 °F (36 4 °C)-98 3 °F (36 8 °C)] 98 3 °F (36 8 °C)  HR:  [65-76] 69  Resp:  [18] 18  BP: (100-132)/(54-61) 132/61  SpO2:  [96 %-99 %] 97 %  Temp (24hrs), Av 9 °F (36 6 °C), Min:97 5 °F (36 4 °C), Max:98 3 °F (36 8 °C)  Current: Temperature: 98 3 °F (36 8 °C)    Physical Exam:   General Appearance:  Alert, interactive, nontoxic, no acute distress  Eyes: Decreased bilateral periorbital edema   Throat: Oropharynx moist without lesions      Lungs:   Clear to auscultation bilaterally; no wheezes, rhonchi or rales; respirations unlabored   Heart:  Irregular; no murmur, rub or gallop   Abdomen:   Soft, non-tender, non-distended, positive bowel sounds  Extremities: No clubbing, cyanosis or edema   Skin: No new rashes or lesions  No draining wounds noted         Labs, Imaging, & Other studies:   All pertinent labs and imaging studies were personally reviewed  Results from last 7 days   Lab Units 07/17/19  0432 07/15/19  2140   WBC Thousand/uL 4 19* 5 88   HEMOGLOBIN g/dL 9 8* 11 1*   PLATELETS Thousands/uL 182 205     Results from last 7 days   Lab Units 07/17/19  0432 07/15/19  2140   SODIUM mmol/L 140 136   POTASSIUM mmol/L 4 3 5 1   CHLORIDE mmol/L 107 102   CO2 mmol/L 22 22   BUN mg/dL 63* 67*   CREATININE mg/dL 3 33* 3 72*   EGFR ml/min/1 73sq m 17 14   CALCIUM mg/dL 9 6 10 4*   AST U/L  --  49*   ALT U/L  --  13   ALK PHOS U/L  --  84

## 2019-07-17 NOTE — ASSESSMENT & PLAN NOTE
SHAHIDA on CKD 4; improved with holding diuretics and IVF per nephrology yesterday     Results from last 7 days   Lab Units 07/17/19  0432 07/15/19  2140   BUN mg/dL 63* 67*   CREATININE mg/dL 3 33* 3 72*   EGFR ml/min/1 73sq m 17 14

## 2019-07-17 NOTE — DISCHARGE INSTR - AVS FIRST PAGE
An appointment has been requested with the neurology office  If you do not hear from the office in 7-10 days after discharge, please call [phone] to schedule an appointment
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
## 2019-07-17 NOTE — ASSESSMENT & PLAN NOTE
Paroxysmal atrial fibrillation on metoprolol    Anticoagulated with warfarin    Results from last 7 days   Lab Units 07/17/19  0432   INR  1 12

## 2019-07-17 NOTE — OCCUPATIONAL THERAPY NOTE
[address] Evaluation     Patient Name: Nohemy PACE Date: 7/17/2019  Problem List  Patient Active Problem List   Diagnosis    Periorbital edema    Chronic systolic congestive heart failure (HCC)    Renal disorder    GERD (gastroesophageal reflux disease)    Paroxysmal atrial fibrillation (HCC)    HTN (hypertension)    Acute renal failure superimposed on stage 4 chronic kidney disease (HCC)    CKD (chronic kidney disease), stage IV ([facility] )    Secondary hyperparathyroidism ([facility] )    Hypercalcemia    BPH (benign prostatic hyperplasia)    PAD (peripheral artery disease) (HCC)    Hyperkalemia    Age-related cognitive decline     Past Medical History  Past Medical History:   Diagnosis Date    CHF (congestive heart failure) (HCC)     Renal disorder      Past Surgical History  Past Surgical History:   Procedure Laterality Date    ABDOMINAL SURGERY      CARDIAC PACEMAKER PLACEMENT               07/17/19 1405   Note Type   Note type Eval/Treat   Restrictions/Precautions   Weight Bearing Precautions Per Order No   Other Precautions Cognitive; Fall Risk;Pain;Visual impairment   Pain Assessment   Pain Assessment Gilbert-Welch FACES   Pain Type Chronic pain;Acute pain   Pain Location Generalized   Pain Frequency Constant/continuous   Hospital Pain Intervention(s) Ambulation/increased activity;Repositioned; Emotional support   Response to Interventions tolerated   Home Living   Type of [address] [place] [address] Two level; Able to live on main level with bedroom/bathroom; Performs ADLs on one level  (1 SHERI; reports 1st floor setup)   Bathroom Shower/Tub Tub/shower unit   Bathroom Toilet Standard   Bathroom Equipment Grab bars in shower   Bathroom Accessibility Accessible  (reoport rollator does not fit)   Home Equipment Walker;Grab bars  (rollator)   Additional Comments pt reports stays on first floor, reports (-) alone unsure of accuracy of information   Prior Function   Level of [place] Independent with ADLs and functional mobility; Needs assistance with IADLs  (independent w/ dressing, assist showers and transfers)   Lives With Daughter   Receives Help From Family   ADL Assistance Independent  (assist showers)   IADLs Needs assistance   Falls in the last 6 months 0   Vocational Retired   Comments pt reports family does cooking and cleaning and transports to appointments   Lifestyle   Autonomy per pt independent w/ ADLs except assist for showers and shower transfers   Reciprocal Relationships daughter   Service to Others retired    Intrinsic Gratification he reports "I don't do much"   ADL   Where Assessed Chair   Eating Assistance 5  Supervision/Setup   Grooming Assistance 5  Supervision/Setup   UB Bathing Assistance 4  Minimal Assistance   LB [address] 3  Moderate Assistance   [address] 4  Minimal Assistance    [address] 3  Moderate Assistance   [address]  3  Moderate Assistance   Bed Mobility   Supine to Sit Unable to assess   Additional Comments pt seated in bedside chair pre/post session   Transfers   Sit to Stand 4  Minimal assistance   Additional items Assist x 1; Increased time required;Verbal cues;Armrests   Stand to Sit 4  Minimal assistance   Additional items Assist x 1; Increased time required;Verbal cues;Armrests   Additional Comments VCs for hand placement and safety   Functional Mobility   Functional Mobility 4  Minimal assistance   Additional Comments assist x1-2 w/ unsteadiness at times    Additional items Rolling walker   Balance   Static Sitting Fair +   Dynamic Sitting Fair   Static Standing Fair -   Dynamic Standing Poor +   Ambulatory Poor   Activity Tolerance   Activity Tolerance Patient limited by fatigue;Treatment limited secondary to medical complications (Comment)   Nurse Made Aware appropriate to see per Gladis WONG Assessment   RUE Assessment WFL  (4-/5)   LUE Assessment   LUE Assessment   (limited elevation reports injury months ago, distal 3+/5)   Hand Function   Gross Motor Coordination Functional   Fine Motor Coordination Impaired   Sensation   Light Touch Partial deficits in the RUE;Partial deficits in the LUE   Additional Comments reports numbness in fingertips   Proprioception   Proprioception No apparent deficits   Vision-Basic Assessment   Current Vision   (reports blind in R eye, decreased vision in L eye)   Vision - Complex Assessment   Acuity Able to read clock/calendar on wall without difficulty  (w/ L eye)   Perception   Inattention/Neglect Appears intact   Cognition   Overall Cognitive Status Impaired   Arousal/Participation Responsive; Cooperative   Attention Attends with cues to redirect   Orientation Level Oriented to person;Oriented to time;Oriented to place  (oriented to month and year, not specific date)   Memory Decreased recall of precautions;Decreased recall of recent events;Decreased short term memory   Following Commands Follows one step commands with increased time or repetition   Comments pt engages in appropriate conversations; stutters at times, [facility]   Assessment   Limitation Decreased UE strength;Decreased ADL status; Decreased Safe judgement during ADL;Decreased cognition;Decreased endurance;Decreased self-care trans;Decreased high-level ADLs; Decreased sensation;Visual deficit   Prognosis Fair;Good   Assessment Pt is a [de-identified] y o  male seen for OT evaluation s/p admit to [facility] on 7/15/2019 w/ Periorbital edema  Comorbidities affecting pt's functional performance at time of assessment include: CHF, renal disorder, PAD, HTN, SHAHIDA superimposed on CKD IV, hyperkalemia, age related cognitive decline  Personal factors affecting pt at time of IE include: poor historian, unsure of accuracy of information  Prior to admission, pt was living w/ daughter and reports independent w/ ADLs except assist w/ showers, independent w/ functional transfers and mobility w/ rollator, assist IADLs, assist transportation   Upon evaluation: Pt requires MIN assist sit<>Stand w/ VCs for hand placement and positioning, MIN assist functional mobility w/ Rollator w/ unsteadiness at times, MIN-MOD assist LB ADLs, MIN assist UB ADLs, MOd assist toileting 2* the following deficits impacting occupational performance: decreased strength and endurance, impaired balance, impaired functional reach, increased pain, impaired vision (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), decreased safety awareness and increased time to respond, [place]  Pt to benefit from continued skilled OT tx while in the hospital to address deficits as defined above and maximize level of functional independence w ADL's and functional mobility  Occupational Performance areas to address include: grooming, bathing/shower, toilet hygiene, dressing, health maintenance, functional mobility and clothing management, formal cognitive assessment  From OT standpoint, recommendation at time of d/c would be short term rehab vs  HOME w/ HOME OT and assist from family if able to provide increased assist    Goals   Patient Goals "to get better"   LTG Time Frame 10-14   Long Term Goal please see below goals   Plan   Treatment Interventions ADL retraining;Functional transfer training; Endurance training;UE strengthening/ROM; Cognitive reorientation;Patient/family training;Equipment evaluation/education; Compensatory technique education; Energy conservation; Activityengagement   Goal Expiration Date 07/31/19   OT Frequency 3-5x/wk   Recommendation   OT Discharge Recommendation Short Term Rehab   OT - OK to Discharge   (to rehab when medically stable)   Barthel Index   Feeding 5   Bathing 0   Grooming Score 0   Dressing Score 5   Bladder Score 10   Bowels Score 10   Toilet Use Score 5   Transfers (Bed/Chair) Score 10   Mobility (Level Surface) Score 0   Stairs Score 0   Barthel Index Score 45   Modified [place] Scale   Modified [place] Scale 4     Occupational Therapy Goals to be met in 10-14 days:  1) Pt will improve activity tolerance to G for min 30 min txment sessions to enhance ADLs  2) Pt will complete ADLs/self care w/ supervision a  3) Pt will complete toileting w/ supervision w/ G hygiene/thoroughness using DME PRN  4) Pt will improve functional transfers on/off all surfaces using DME PRN w/ G balance/safety including toileting w/ supervision  5) Pt will improve fx'l mobility during I/ADl/leisure tasks using DME PRN w/ g balance/safety w/ supervision  6) Pt will engage in ongoing cognitive assessment w/ G participation to A w/ safe d/c planning/recommendations  7) Pt will demonstrate G carryover of pt/caregiver education and training as appropriate w/ mod I  w/ G tolerance  8) Pt will engage in depression screen/leisure interest checklist w/ G participation to monitor s/s depression and ID 3 positive coping strategies to A w/ emotional regulation and management  9) Pt will demonstrate 100% carryover of E C  techniques w/ mod I t/o fx'l I/ADL/leisure tasks w/o cues s/p skilled education  10) Pt will demonstrate improved bed mobility to supervision to enhance ADLs  11) Pt will demonstrate improved standing tolerance to 3-5 minutes during functional tasks w/ no LOB to enhance ADL performance  12) Pt will demonstrate improved R UE strength by 1 MMT grade to enhance ADLS and functional transfers     Documentation completed by: Sebastian Reddy MS, OTR/L
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
## 2019-07-17 NOTE — APP STUDENT NOTE
[facility] Internal Medicine Progress Note  Patient: Eileen Boyd [de-identified] y o  male   MRN: [id number]  PCP: Bari aRdford PA-C  Unit/Bed#: E4 -79 Encounter: [id number]  Date Of Visit: 07/17/19    Assessment:    Principal Problem:    Periorbital edema  Active Problems:    Chronic systolic congestive heart failure (HCC)    Renal disorder    GERD (gastroesophageal reflux disease)    Paroxysmal atrial fibrillation (HCC)    HTN (hypertension)    Acute renal failure superimposed on stage 4 chronic kidney disease (HCC)    CKD (chronic kidney disease), stage IV (HCC)    Secondary hyperparathyroidism ([facility] [address] )    Hypercalcemia    BPH (benign prostatic hyperplasia)    PAD (peripheral artery disease) (HCC)    Hyperkalemia    Age-related cognitive decline      Plan:    1  Periorbital edema  · Pt originally presented with worsening b/l eye redness and swelling  · CT showed bilateral periorbital cellulitis  Pt started on vancomycin and rocephin  · ID consult completed and noted sxs likely due to an allergic rxn as opposed to infectious cellulitis and d/c abx  · Ophthalmology consult completed and advised edema is likely due to allergic/contact dermatitis  D/c all ophthalmologic drops and consider systemic steroids for edema  · Swelling has improved today - continue with cool compresses to b/l eyes and solumedrol BID  2  Macrocytic anemia  · Hgb trending down to 9 8 from 11 1  · MCV elevated - will check B12 and folate  3  Social issues at home  · Pt recently moved to this area from [place], [place] and lives with one of his daughters  · He has been telling multiple hospital staff members that he "can't go back there" following d/c due to lack of appropriate care  · CM met with pt and daughter yesterday and determined pt will be d c home to family once medically cleared        4  Age-related cognitive decline  · Daughter requested neurology evaluation for cognitive decline  · Spoke with neurology who recommends outpatient neurocognitive testing  5  HTN  · BP stable  · Continue home meds - hydralazine, isosorbide and metoprolol  Pt also on flomax  · Continue to monitor  6  Afib  · Rate controlled with metoprolol  · Anticoagulated with warfarin, INR 1 12    7  GERD  · Continue PPI    8  SHAHIDA superimposed on CKD stage IV  · Cr 3 72 on admission, improved to 3 33 this AM  · Nephrology following - likely pre-renal  · Plan to restart lasix within 24-48h  · Pending UA, bladder scan negative  · [place] neph[place] of [place] - he will need outpatient f/u locally pending d/c     9  CHF  · Managed through [facility] in [place], [place]  · Last echo completed there in 2/18/19, EF <30%  · Lasix held at admission 2/2 SHAHIDA with plan to restart within 24-48h  · Continue to monitor I&Os and daily weights    10  Hypercalcemia  · Ca 10 4 on admission, improved to 9 6 this AM  · Nephrology following - D/c calcium carbonate    11  Secondary hyperparathyroidism  · Likely 2/2 CKD, nephrology following   ·  7, phosphorus 4 5      VTE Pharmacologic Prophylaxis:   Pharmacologic: Warfarin (Coumadin)  Mechanical VTE Prophylaxis in Place: Yes    Patient Centered Rounds: I have performed bedside rounds with nursing staff today  Discussions with Specialists or Other Care Team Provider: none    Education and Discussions with Family / Patient: patient    Time Spent for Care: 30 minutes  More than 50% of total time spent on counseling and coordination of care as described above  Current Length of Stay: 2 day(s)    Current Patient Status: Inpatient   Certification Statement: The patient will continue to require additional inpatient hospital stay due to periorbital cellulitis    Discharge Plan / Estimated Discharge Date: pending clinical course    Code Status: Level 1 - Full Code      Subjective:   [address] yo M who is seen and examined on HD1 c/o persistent discomfort around his eyes   He describes the sensation as a "burning, stinging" around his eyes and under the skin of his forehead  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 and admits to worsening of his vision secondary to swelling around b/l eyes  He notes associated chills but denies any pain with eye movements, fever, eye drainage or eye crusting  Denies any additional CP, SOB, abdominal pain, N/V/D or leg pain/swelling  Objective:     Vitals:   Temp (24hrs), Av 9 °F (36 6 °C), Min:97 5 °F (36 4 °C), Max:98 3 °F (36 8 °C)    Temp:  [97 5 °F (36 4 °C)-98 3 °F (36 8 °C)] 98 3 °F (36 8 °C)  HR:  [65-76] 69  Resp:  [18] 18  BP: (100-132)/(54-61) 132/61  SpO2:  [96 %-99 %] 97 %  Body mass index is 22 44 kg/m²  Input and Output Summary (last 24 hours): Intake/Output Summary (Last 24 hours) at 2019 1117  Last data filed at 2019 0912  Gross per 24 hour   Intake 2530 ml   Output 750 ml   Net 1780 ml       Physical Exam:     Physical Exam   Constitutional: He is oriented to person, place, and time  He appears well-developed and well-nourished  No distress  Sitting upright in chair at bedside in NAD   HENT:   Head: Normocephalic and atraumatic  Nose: Nose normal    Mouth/Throat: Oropharynx is clear and moist    Eyes: Right eye exhibits no discharge  Left eye exhibits no discharge  Moderate swelling of b/l upper and lower eye lids with slight erythema  Improved from yesterday   Neck: Normal range of motion  Neck supple  Cardiovascular: Normal rate, normal heart sounds and intact distal pulses  An irregularly irregular rhythm present  No murmur heard  Pulmonary/Chest: Effort normal and breath sounds normal  No respiratory distress  He has no wheezes  He has no rales  Abdominal: Soft  He exhibits no distension  There is no tenderness  Musculoskeletal: Normal range of motion  He exhibits no edema or tenderness  No b/l LE edema or calf tenderness  Lymphadenopathy:     He has no cervical adenopathy     Neurological: He is alert and oriented to person, place, and time  Skin: Skin is warm and dry  No rash noted  He is not diaphoretic  Psychiatric: He has a normal mood and affect  His behavior is normal    Nursing note and vitals reviewed  Additional Data:     Labs:    Results from last 7 days   Lab Units 07/17/19  0432 07/15/19  2140   WBC Thousand/uL 4 19* 5 88   HEMOGLOBIN g/dL 9 8* 11 1*   HEMATOCRIT % 31 1* 35 6*   PLATELETS Thousands/uL 182 205   NEUTROS PCT %  --  47   LYMPHS PCT %  --  33   MONOS PCT %  --  13*   EOS PCT %  --  6     Results from last 7 days   Lab Units 07/17/19  0432 07/15/19  2140   POTASSIUM mmol/L 4 3 5 1   CHLORIDE mmol/L 107 102   CO2 mmol/L 22 22   BUN mg/dL 63* 67*   CREATININE mg/dL 3 33* 3 72*   CALCIUM mg/dL 9 6 10 4*   ALK PHOS U/L  --  84   ALT U/L  --  13   AST U/L  --  49*     Results from last 7 days   Lab Units 07/17/19  0432   INR  1 12       * I Have Reviewed All Lab Data Listed Above  * Additional Pertinent Lab Tests Reviewed:  [facility] Admission Reviewed    Imaging:    Imaging Reports Reviewed Today Include: all  Imaging Personally Reviewed by Myself Includes:  all    Recent Cultures (last 7 days):           Last 24 Hours Medication List:     Current Facility-Administered Medications:  acetaminophen 650 mg Oral Q6H PRN Balta Cheese, PA-C   atorvastatin 80 mg Oral Daily With [employer], PA-C   diphenhydrAMINE 25 mg Intravenous Q6H PRN Balta Cheese, PA-C   gabapentin 300 mg Oral Daily Balta Cheese, PA-C   hydrALAZINE 50 mg Oral Q8H [address] AUDREY Cerna   isosorbide dinitrate 20 mg Oral TID after meals AUDREY Cerna   methylPREDNISolone sodium succinate 20 mg Intravenous Q12H [address] Moise Mooney DO   metoprolol succinate 25 mg Oral Daily AURDEY Cerna   ondansetron 4 mg Intravenous Q6H PRN Balta Cheese, PA-C   pantoprazole 40 mg Oral Early Morning Balta Cheese, PA-C   tamsulosin 0 8 mg Oral Daily With [employer], PA-C   warfarin 3 mg Oral Daily (warfarin) Ghislaine Perez, DO        Today, Patient Was Seen By: KERRI Orellana    7/17/2019  11:17 AM

## 2019-07-18 LAB
ALBUMIN SERPL BCP-MCNC: 3.5 G/DL (ref 3.5–5)
ALP SERPL-CCNC: 77 U/L (ref 46–116)
ALT SERPL W P-5'-P-CCNC: 11 U/L (ref 12–78)
ANION GAP SERPL CALCULATED.3IONS-SCNC: 13 MMOL/L (ref 4–13)
AST SERPL W P-5'-P-CCNC: 19 U/L (ref 5–45)
BILIRUB SERPL-MCNC: 0.42 MG/DL (ref 0.2–1)
BUN SERPL-MCNC: 76 MG/DL (ref 5–25)
CALCIUM SERPL-MCNC: 9.7 MG/DL (ref 8.3–10.1)
CHLORIDE SERPL-SCNC: 104 MMOL/L (ref 100–108)
CO2 SERPL-SCNC: 21 MMOL/L (ref 21–32)
CREAT SERPL-MCNC: 3.39 MG/DL (ref 0.6–1.3)
FOLATE SERPL-MCNC: 9.7 NG/ML (ref 3.1–17.5)
GFR SERPL CREATININE-BSD FRML MDRD: 16 ML/MIN/1.73SQ M
GLUCOSE SERPL-MCNC: 128 MG/DL (ref 65–140)
IRON SATN MFR SERPL: 35 %
IRON SERPL-MCNC: 69 UG/DL (ref 65–175)
POTASSIUM SERPL-SCNC: 4.8 MMOL/L (ref 3.5–5.3)
PROT SERPL-MCNC: 7.9 G/DL (ref 6.4–8.2)
SODIUM SERPL-SCNC: 138 MMOL/L (ref 136–145)
TIBC SERPL-MCNC: 198 UG/DL (ref 250–450)
VIT B12 SERPL-MCNC: 478 PG/ML (ref 100–900)

## 2019-07-18 PROCEDURE — 99232 SBSQ HOSP IP/OBS MODERATE 35: CPT | Performed by: INTERNAL MEDICINE

## 2019-07-18 PROCEDURE — 82607 VITAMIN B-12: CPT | Performed by: INTERNAL MEDICINE

## 2019-07-18 PROCEDURE — 83540 ASSAY OF IRON: CPT | Performed by: INTERNAL MEDICINE

## 2019-07-18 PROCEDURE — 80053 COMPREHEN METABOLIC PANEL: CPT | Performed by: INTERNAL MEDICINE

## 2019-07-18 PROCEDURE — 83550 IRON BINDING TEST: CPT | Performed by: INTERNAL MEDICINE

## 2019-07-18 PROCEDURE — 82746 ASSAY OF FOLIC ACID SERUM: CPT | Performed by: INTERNAL MEDICINE

## 2019-07-18 RX ORDER — FUROSEMIDE 40 MG/1
40 TABLET ORAL
Status: DISCONTINUED | OUTPATIENT
Start: 2019-07-19 | End: 2019-07-21 | Stop reason: HOSPADM

## 2019-07-18 RX ORDER — METHYLPREDNISOLONE SODIUM SUCCINATE 40 MG/ML
20 INJECTION, POWDER, LYOPHILIZED, FOR SOLUTION INTRAMUSCULAR; INTRAVENOUS EVERY 12 HOURS SCHEDULED
Status: COMPLETED | OUTPATIENT
Start: 2019-07-18 | End: 2019-07-18

## 2019-07-18 RX ORDER — PREDNISONE 1 MG/1
10 TABLET ORAL DAILY
Status: DISCONTINUED | OUTPATIENT
Start: 2019-07-25 | End: 2019-07-21 | Stop reason: HOSPADM

## 2019-07-18 RX ORDER — PREDNISONE 20 MG/1
40 TABLET ORAL DAILY
Status: COMPLETED | OUTPATIENT
Start: 2019-07-19 | End: 2019-07-20

## 2019-07-18 RX ORDER — PREDNISONE 20 MG/1
20 TABLET ORAL DAILY
Status: DISCONTINUED | OUTPATIENT
Start: 2019-07-23 | End: 2019-07-21 | Stop reason: HOSPADM

## 2019-07-18 RX ADMIN — ISOSORBIDE DINITRATE 20 MG: 10 TABLET ORAL at 08:57

## 2019-07-18 RX ADMIN — ISOSORBIDE DINITRATE 20 MG: 10 TABLET ORAL at 12:44

## 2019-07-18 RX ADMIN — ACETAMINOPHEN 650 MG: 325 TABLET ORAL at 09:15

## 2019-07-18 RX ADMIN — METHYLPREDNISOLONE SODIUM SUCCINATE 20 MG: 40 INJECTION, POWDER, FOR SOLUTION INTRAMUSCULAR; INTRAVENOUS at 09:00

## 2019-07-18 RX ADMIN — ISOSORBIDE DINITRATE 20 MG: 10 TABLET ORAL at 16:57

## 2019-07-18 RX ADMIN — HYDRALAZINE HYDROCHLORIDE 50 MG: 25 TABLET ORAL at 06:10

## 2019-07-18 RX ADMIN — METHYLPREDNISOLONE SODIUM SUCCINATE 20 MG: 40 INJECTION, POWDER, FOR SOLUTION INTRAMUSCULAR; INTRAVENOUS at 21:22

## 2019-07-18 RX ADMIN — PANTOPRAZOLE SODIUM 40 MG: 40 TABLET, DELAYED RELEASE ORAL at 06:00

## 2019-07-18 RX ADMIN — GABAPENTIN 300 MG: 300 CAPSULE ORAL at 08:56

## 2019-07-18 RX ADMIN — HYDRALAZINE HYDROCHLORIDE 50 MG: 25 TABLET ORAL at 14:55

## 2019-07-18 RX ADMIN — TAMSULOSIN HYDROCHLORIDE 0.8 MG: 0.4 CAPSULE ORAL at 16:57

## 2019-07-18 RX ADMIN — ATORVASTATIN CALCIUM 80 MG: 80 TABLET, FILM COATED ORAL at 16:57

## 2019-07-18 RX ADMIN — METOPROLOL SUCCINATE 25 MG: 25 TABLET, EXTENDED RELEASE ORAL at 08:57

## 2019-07-18 RX ADMIN — WARFARIN SODIUM 3 MG: 3 TABLET ORAL at 16:58

## 2019-07-18 NOTE — PROGRESS NOTES
NEPHROLOGY PROGRESS NOTE   Price Rodgers [de-identified] y o  male MRN: [id number]  Unit/Bed#: E4 -01 Encounter: [id number]      ASSESSMENT & PLAN:    50-year-old male with a past medical history of stage 4 chronic kidney disease, chronic systolic congestive heart failure, who presented with periorbital cellulitis    1  Acute kidney injury present on admission likely secondary to pre renal azotemia  2  Stage 4 chronic kidney disease with a baseline creatinine in the mid 2s to low 3  3  Ischemic cardiomyopathy   4  Hypertension  5   CKD bone mineral disease    -overall renal function is at bases  -periorbital cellulitis has improved  -Blood pressures are acceptable  -if creatinine is stable tomorrow can consider re-initiation of diuretics  -was schedule outpatient follow-up upon discharge    SUBJECTIVE:    Patient was seen today feeling better edema around diet S and itching has improved urine output is stable    OBJECTIVE:  Current Weight: Weight - Scale: 66 2 kg (145 lb 15 1 oz)  Vitals:    07/18/19 0737   BP: 152/80   Pulse: 63   Resp: 18   Temp: (!) 97 3 °F (36 3 °C)   SpO2: 97%       Intake/Output Summary (Last 24 hours) at 7/18/2019 1338  Last data filed at 7/18/2019 1327  Gross per 24 hour   Intake 560 ml   Output 1875 ml   Net -1315 ml       General: conscious, cooperative, in not acute distress  Eyes:  Periorbital edema improved still with erythema  ENT: lips and mucous membranes moist  Neck: supple, no JVD  Chest: clear breath sounds bilateral, no crackles, ronchus or wheezings  CVS: distinct S1 & S2, normal rate, regular rhythm  Abdomen: soft, non-tender, non-distended, normoactive bowel sounds  Extremities: no edema of both legs  Skin: no rash  Neuro: awake, alert, oriented    Medications:    Current Facility-Administered Medications:     acetaminophen (TYLENOL) tablet 650 mg, 650 mg, Oral, Q6H PRN, Arina Wayne PA-C, 650 mg at 07/18/19 0915    atorvastatin (LIPITOR) tablet 80 mg, 80 mg, Oral, Daily With Herbert Geiger PA-C, 80 mg at 07/17/19 1605    diphenhydrAMINE (BENADRYL) injection 25 mg, 25 mg, Intravenous, Q6H PRN, Rosy Manriquez PA-C, 25 mg at 07/17/19 2212    [START ON 7/19/2019] furosemide (LASIX) tablet 40 mg, 40 mg, Oral, BID (diuretic), Jezfabrice Disla, DO    gabapentin (NEURONTIN) capsule 300 mg, 300 mg, Oral, Daily, Rosy Manriquez PA-C, 300 mg at 07/18/19 0856    hydrALAZINE (APRESOLINE) tablet 50 mg, 50 mg, Oral, Q8H [address], AUDREY Lynch, 50 mg at 07/18/19 0610    isosorbide dinitrate (ISORDIL) tablet 20 mg, 20 mg, Oral, TID after meals, AUDREY Lynch, 20 mg at 07/18/19 1244    methylPREDNISolone sodium succinate (Solu-MEDROL) injection 20 mg, 20 mg, Intravenous, Q12H [address], Moise Mooney DO    metoprolol succinate (TOPROL-XL) 24 hr tablet 25 mg, 25 mg, Oral, Daily, AUDREY Lynch, 25 mg at 07/18/19 0857    ondansetron (ZOFRAN) injection 4 mg, 4 mg, Intravenous, Q6H PRN, Rosy Manriquez PA-C    pantoprazole (PROTONIX) EC tablet 40 mg, 40 mg, Oral, Early Morning, Rosy Manriquez PA-C, 40 mg at 07/18/19 0600    [START ON 7/19/2019] predniSONE tablet 40 mg, 40 mg, Oral, Daily **FOLLOWED BY** [START ON 7/21/2019] predniSONE tablet 30 mg, 30 mg, Oral, Daily **FOLLOWED BY** [START ON 7/23/2019] predniSONE tablet 20 mg, 20 mg, Oral, Daily **FOLLOWED BY** [START ON 7/25/2019] predniSONE tablet 10 mg, 10 mg, Oral, Daily, Moise Mooney DO    tamsulosin (FLOMAX) capsule 0 8 mg, 0 8 mg, Oral, Daily With Dinner, Rosy Manriquez PA-C, 0 8 mg at 07/17/19 1605    warfarin (COUMADIN) tablet 3 mg, 3 mg, Oral, Daily (warfarin), Moise Mooney DO, 3 mg at 07/17/19 1737    Invasive Devices:      Lab Results:   Results from last 7 days   Lab Units 07/18/19  0456 07/17/19  0432 07/15/19  2140   WBC Thousand/uL  --  4 19* 5 88   HEMOGLOBIN g/dL  --  9 8* 11 1*   HEMATOCRIT %  --  31 1* 35 6*   PLATELETS Thousands/uL  --  182 205   POTASSIUM mmol/L 4 8 4 3 5 1   CHLORIDE mmol/L 104 107 102   CO2 mmol/L 21 22 22   BUN mg/dL 76* 63* 67*   CREATININE mg/dL 3 39* 3 33* 3 72*   CALCIUM mg/dL 9 7 9 6 10 4*   PHOSPHORUS mg/dL  --  4 5*  --    ALK PHOS U/L 77  --  84   ALT U/L 11*  --  13   AST U/L 19  --  49*       Previous work up:  Please see previous notes

## 2019-07-18 NOTE — ASSESSMENT & PLAN NOTE
Hyperkalemia discontinued standing potassium 10 mEq daily    Results from last 7 days   Lab Units 07/18/19  0456 07/17/19  0432 07/15/19  2140   POTASSIUM mmol/L 4 8 4 3 5 1

## 2019-07-18 NOTE — ASSESSMENT & PLAN NOTE
Ngoc orbital edema of both eyes  Seen by infectious disease and ophthalmology  Does not appear infectious, improving with IV steroids for allergic/contact dermatitis    Will transition to prednisone starting tomorrow

## 2019-07-18 NOTE — PROGRESS NOTES
Progress Note Eron Landers 1940, [de-identified] y o  male MRN: [id number]    Unit/Bed#: E4 -01 Encounter: [id number]    Primary Care Provider: Philly Rey PA-C   Date and time admitted to hospital: 7/15/2019  8:30 PM        * Periorbital edema  Assessment & Plan  Ngoc orbital edema of both eyes  Seen by infectious disease and ophthalmology  Does not appear infectious, improving with IV steroids for allergic/contact dermatitis  Will transition to prednisone starting tomorrow    Age-related cognitive decline  Assessment & Plan  Cognitive decline  Discussed with neurology who recommends outpatient neurocognitive testing    Hyperkalemia  Assessment & Plan  Hyperkalemia discontinued standing potassium 10 mEq daily    Results from last 7 days   Lab Units 07/18/19  0456 07/17/19  0432 07/15/19  2140   POTASSIUM mmol/L 4 8 4 3 5 1       PAD (peripheral artery disease) (HCC)  Assessment & Plan  PA CA status post right lower extremity stenting  Continue atorvastatin and anticoagulation  BPH (benign prostatic hyperplasia)  Assessment & Plan  BPH continue tamsulosin    Acute renal failure superimposed on stage 4 chronic kidney disease ([facility] [address] )  Assessment & Plan  SHAHIDA on CKD 4; improved with holding diuretics and IVF per nephrology  Will restart diuretics tomorrow    Results from last 7 days   Lab Units 07/18/19  0456 07/17/19  0432 07/15/19  2140   BUN mg/dL 76* 63* 67*   CREATININE mg/dL 3 39* 3 33* 3 72*   EGFR ml/min/1 73sq m 16 17 14       HTN (hypertension)  Assessment & Plan  Essential hypertension on metoprolol and hydralazine    Paroxysmal atrial fibrillation (HCC)  Assessment & Plan  Paroxysmal atrial fibrillation on metoprolol    Anticoagulated with warfarin    Results from last 7 days   Lab Units 07/17/19  0432   INR  1 12       GERD (gastroesophageal reflux disease)  Assessment & Plan  GERD continue pantoprazole    Chronic systolic congestive heart failure (HCC)  Assessment & Plan  Wt Readings from Last 3 Encounters:   07/18/19 66 2 kg (145 lb 15 1 oz)   07/05/19 63 kg (139 lb)     Chronic systolic CHF last LVEF 28%  Holding diuretics given kidney injury  Still appears compensated, will restart diuretics tomorrow    VTE Pharmacologic Prophylaxis: Warfarin (Coumadin)    Patient Centered Rounds: I have performed bedside rounds with nursing staff today  Discussions with Specialists or Other Care Team Provider:   Education and Discussions with Family / Patient:     Time Spent for Care: 30 mins  More than 50% of total time spent on counseling and coordination of care as described above  Current Length of Stay: 3 day(s)  Current Patient Status: Inpatient     Certification Statement: The patient will continue to require additional inpatient hospital stay due to Periorbital edema  Discharge Plan / Estimated Discharge Date:  Recommendation is for short-term rehab    Code Status: Level 1 - Full Code  ______________________________________________________________________________    Subjective:   Patient seen and examined  Still having eye burning  Still feeling very weak  Objective:   Vitals: Blood pressure 152/80, pulse 63, temperature (!) 97 3 °F (36 3 °C), temperature source Tympanic, resp  rate 18, height 5' 7" (1 702 m), weight 66 2 kg (145 lb 15 1 oz), SpO2 97 %      Physical Exam:   General appearance: alert, appears stated age and cooperative  Head: atraumatic, improvement of periorbital edema bilaterally  Lungs: clear to auscultation bilaterally  Heart: regular rate and rhythm  Abdomen: soft, non-tender, positive bowel sounds   Back: negative, range of motion normal  Extremities: extremities atraumatic, no cyanosis or edema  Neurologic: Grossly normal    Additional Data:   Labs:  Results from last 7 days   Lab Units 07/17/19  0432 07/15/19  2140   WBC Thousand/uL 4 19* 5 88   HEMOGLOBIN g/dL 9 8* 11 1*   HEMATOCRIT % 31 1* 35 6*   MCV fL 103* 104*   PLATELETS Thousands/uL 182 205   INR  1 12  -- Results from last 7 days   Lab Units 07/18/19  0456 07/17/19  0432 07/15/19  2140   SODIUM mmol/L 138 140 136   POTASSIUM mmol/L 4 8 4 3 5 1   CHLORIDE mmol/L 104 107 102   CO2 mmol/L 21 22 22   ANION GAP mmol/L 13 11 12   BUN mg/dL 76* 63* 67*   CREATININE mg/dL 3 39* 3 33* 3 72*   CALCIUM mg/dL 9 7 9 6 10 4*   ALBUMIN g/dL 3 5  --  3 9   TOTAL BILIRUBIN mg/dL 0 42  --  0 62   ALK PHOS U/L 77  --  84   ALT U/L 11*  --  13   AST U/L 19  --  49*   EGFR ml/min/1 73sq m 16 17 14   GLUCOSE RANDOM mg/dL 128 107 93     Results from last 7 days   Lab Units 07/17/19  0432   PHOSPHORUS mg/dL 4 5*              Results from last 7 days   Lab Units 07/17/19  0432   PROCALCITONIN ng/ml 0 10                 * I Have Reviewed All Lab Data Listed Above  Cultures:           Imaging:  Imaging Reports Reviewed Today Include:   Procedure: Ct Orbits/temporal Bones/skull Base Wo Contrast    Result Date: 7/15/2019  Narrative: CT ORBITS WITHOUT CONTRAST INDICATION:   Bilateral eye burning and itching  COMPARISON: None TECHNIQUE: Axial CT imaging through the orbits was performed  In addition, sagittal and coronal reformatted images were submitted for interpretation  Radiation dose length product (DLP) for this visit:  232 mGy-cm   This examination, like all CT scans performed in the [facility], was performed utilizing techniques to minimize radiation dose exposure, including the use of iterative reconstruction and automated exposure control  IMAGE QUALITY: Diagnostic  FINDINGS: ORBITS: Bilateral orbital preseptal soft tissue thickening  Intact globes  No retro-orbital inflammation or fluid collection  No proptosis  Intact extraocular muscles  SINUSES: Trace mucosal thickening in the ethmoid air cells and maxillary sinuses  Minimal debris in the left sphenoid sinus  SOFT TISSUES: Bilateral preseptal soft tissue swelling  BONY STRUCTURES: Well-pneumatized and clear mastoid air cells and middle ears  Impression: Findings compatible with bilateral periorbital cellulitis  No retro-orbital inflammation or abscess  Workstation performed: [id number]     Scheduled Meds:  Current Facility-Administered Medications:  acetaminophen 650 mg Oral Q6H PRN EMANUEL Chaudhari-C   atorvastatin 80 mg Oral Daily With JEFFERY Chambers   diphenhydrAMINE 25 mg Intravenous Q6H PRN Amor Humphreys, PA-C   gabapentin 300 mg Oral Daily EMANUEL Chaudhari-C   hydrALAZINE 50 mg Oral Q8H [facility] AUDREY Pate   isosorbide dinitrate 20 mg Oral TID after meals AUDREY Pate   methylPREDNISolone sodium succinate 20 mg Intravenous Q12H [facility] Moise Mooney DO   metoprolol succinate 25 mg Oral Daily AUDREY Pate   ondansetron 4 mg Intravenous Q6H PRN EMANUEL Chaudhari-ALINA   pantoprazole 40 mg Oral Early Morning EMANUEL Chaudhari-C   tamsulosin 0 8 mg Oral Daily With JEFFERY Chambers   warfarin 3 mg Oral Daily (warfarin) Anant Regalado, DO Anant Regalado, DO [address] [facility]  Hospitalist    ** Please Note: This note has been constructed using a voice recognition system   **

## 2019-07-18 NOTE — ASSESSMENT & PLAN NOTE
Wt Readings from Last 3 Encounters:   07/18/19 66 2 kg (145 lb 15 1 oz)   07/05/19 63 kg (139 lb)     Chronic systolic CHF last LVEF 69%  Holding diuretics given kidney injury    Still appears compensated, will restart diuretics tomorrow

## 2019-07-18 NOTE — ASSESSMENT & PLAN NOTE
SHAHIDA on CKD 4; improved with holding diuretics and IVF per nephrology    Will restart diuretics tomorrow    Results from last 7 days   Lab Units 07/18/19  0456 07/17/19  0432 07/15/19  2140   BUN mg/dL 76* 63* 67*   CREATININE mg/dL 3 39* 3 33* 3 72*   EGFR ml/min/1 73sq m 16 17 14

## 2019-07-18 NOTE — PROGRESS NOTES
Progress Note - Infectious Disease   Alberto Justice [address] [place] [address] y o  male MRN: [id number]  Unit/Bed#: E4 -01 Encounter: [id number]      Impression/Plan:  1  Bilateral periorbital edema-based upon the chronicity of the problem, the clinical presentation, and the bilateral nature, I doubt the patient has a acute bacterial cellulitis  Appears to be secondary to an allergic or contact reaction   Fortunately the patient is not systemically ill, has a normal WBC count, and is nontoxic   The edema is significantly improved with steroids  -monitor off all antibiotics  -ophthalmology follow-up  -systemic steroids  -serial exams as per Ophthalmology and the primary service  -no additional ID workup for now     2  Acute kidney injury-complicating chronic kidney disease stage 4  Patient had been on short-term hemodialysis in the past   Patient's renal function has improved and now stabilized  -monitor BMP  -volume management  -nephrology follow-up     3  Ischemic cardiomyopathy and-with an ejection fraction of 20%   Was being followed by a cardiac transplant team at [facility]   -volume management  -holding diuresis for now     4  Atrial fibrillation-rate controlled and on anticoagulation     No active infectious disease issues  We will sign off  Please call if questions  Antibiotics:  None    Subjective:  Patient has no fever, chills, sweats; no nausea, vomiting, diarrhea; no cough, shortness of breath; no pain  No new symptoms  Still with some itching and burning of the eyes  However the swelling is much improved  Objective:  Vitals:  Temp:  [97 2 °F (36 2 °C)-98 5 °F (36 9 °C)] 97 3 °F (36 3 °C)  HR:  [61-67] 63  Resp:  [18] 18  BP: (103-153)/(50-89) 152/80  SpO2:  [95 %-99 %] 97 %  Temp (24hrs), Av 7 °F (36 5 °C), Min:97 2 °F (36 2 °C), Max:98 5 °F (36 9 °C)  Current: Temperature: (!) 97 3 °F (36 3 °C)    Physical Exam:   General Appearance:  Alert, interactive, nontoxic, no acute distress     Throat: Oropharynx moist without lesions  Eyes: Decreased periorbital edema without any drainage   Lungs:   Clear to auscultation bilaterally; no wheezes, rhonchi or rales; respirations unlabored   Heart:  RRR; no murmur, rub or gallop   Abdomen:   Soft, non-tender, non-distended, positive bowel sounds  Extremities: No clubbing, cyanosis or edema   Skin: No new rashes or lesions  No draining wounds noted         Labs, Imaging, & Other studies:   All pertinent labs and imaging studies were personally reviewed  Results from last 7 days   Lab Units 07/17/19  0432 07/15/19  2140   WBC Thousand/uL 4 19* 5 88   HEMOGLOBIN g/dL 9 8* 11 1*   PLATELETS Thousands/uL 182 205     Results from last 7 days   Lab Units 07/18/19  0456 07/17/19  0432 07/15/19  2140   SODIUM mmol/L 138 140 136   POTASSIUM mmol/L 4 8 4 3 5 1   CHLORIDE mmol/L 104 107 102   CO2 mmol/L 21 22 22   BUN mg/dL 76* 63* 67*   CREATININE mg/dL 3 39* 3 33* 3 72*   EGFR ml/min/1 73sq m 16 17 14   CALCIUM mg/dL 9 7 9 6 10 4*   AST U/L 19  --  49*   ALT U/L 11*  --  13   ALK PHOS U/L 77  --  84         Results from last 7 days   Lab Units 07/17/19  0432   PROCALCITONIN ng/ml 0 10

## 2019-07-18 NOTE — PLAN OF CARE
Problem: Potential for Falls  Goal: Patient will remain free of falls  Description  INTERVENTIONS:  - Assess patient frequently for physical needs  -  Identify cognitive and physical deficits and behaviors that affect risk of falls    -  [place] fall precautions as indicated by assessment   - Educate patient/family on patient safety including physical limitations  - Instruct patient to call for assistance with activity based on assessment  - Modify environment to reduce risk of injury  - Consider OT/PT consult to assist with strengthening/mobility  Outcome: Progressing     Problem: Prexisting or High Potential for Compromised Skin Integrity  Goal: Skin integrity is maintained or improved  Description  INTERVENTIONS:  - Identify patients at risk for skin breakdown  - Assess and monitor skin integrity  - Assess and monitor nutrition and hydration status  - Monitor labs (i e  albumin)  - Assess for incontinence   - Turn and reposition patient  - Assist with mobility/ambulation  - Relieve pressure over bony prominences  - Avoid friction and shearing  - Provide appropriate hygiene as needed including keeping skin clean and dry  - Evaluate need for skin moisturizer/barrier cream  - Collaborate with interdisciplinary team (i e  Nutrition, Rehabilitation, etc )   - Patient/family teaching  Outcome: Progressing     Problem: PAIN - ADULT  Goal: Verbalizes/displays adequate comfort level or baseline comfort level  Description  Interventions:  - Encourage patient to monitor pain and request assistance  - Assess pain using appropriate pain scale  - Administer analgesics based on type and severity of pain and evaluate response  - Implement non-pharmacological measures as appropriate and evaluate response  - Consider cultural and social influences on pain and pain management  - Notify physician/advanced practitioner if interventions unsuccessful or patient reports new pain  Outcome: Progressing     Problem: INFECTION - ADULT  Goal: Absence or prevention of progression during hospitalization  Description  INTERVENTIONS:  - Assess and monitor for signs and symptoms of infection  - Monitor lab/diagnostic results  - Monitor all insertion sites, i e  indwelling lines, tubes, and drains  - Monitor endotracheal (as able) and nasal secretions for changes in amount and color  - [place] appropriate cooling/warming therapies per order  - Administer medications as ordered  - Instruct and encourage patient and family to use good hand hygiene technique  - Identify and instruct in appropriate isolation precautions for identified infection/condition  Outcome: Progressing     Problem: SAFETY ADULT  Goal: Maintain or return to baseline ADL function  Description  INTERVENTIONS:  -  Assess patient's ability to carry out ADLs; assess patient's baseline for ADL function and identify physical deficits which impact ability to perform ADLs (bathing, care of mouth/teeth, toileting, grooming, dressing, etc )  - Assess/evaluate cause of self-care deficits   - Assess range of motion  - Assess patient's mobility; develop plan if impaired  - Assess patient's need for assistive devices and provide as appropriate  - Encourage maximum independence but intervene and supervise when necessary  ¯ Involve family in performance of ADLs  ¯ Assess for home care needs following discharge   ¯ Request OT consult to assist with ADL evaluation and planning for discharge  ¯ Provide patient education as appropriate  Outcome: Progressing  Goal: Maintain or return mobility status to optimal level  Description  INTERVENTIONS:  - Assess patient's baseline mobility status (ambulation, transfers, stairs, etc )    - Identify cognitive and physical deficits and behaviors that affect mobility  - Identify mobility aids required to assist with transfers and/or ambulation (gait belt, sit-to-stand, lift, walker, cane, etc )  - [place] fall precautions as indicated by assessment  - Record patient progress and toleration of activity level on Mobility SBAR; progress patient to next Phase/Stage  - Instruct patient to call for assistance with activity based on assessment  - Request Rehabilitation consult to assist with strengthening/weightbearing, etc   Outcome: Progressing     Problem: DISCHARGE PLANNING  Goal: Discharge to home or other facility with appropriate resources  Description  INTERVENTIONS:  - Identify barriers to discharge w/patient and caregiver  - Arrange for needed discharge resources and transportation as appropriate  - Identify discharge learning needs (meds, wound care, etc )  - Arrange for interpretive services to assist at discharge as needed  - Refer to Case Management Department for coordinating discharge planning if the patient needs post-hospital services based on physician/advanced practitioner order or complex needs related to functional status, cognitive ability, or social support system  Outcome: Progressing     Problem: Knowledge Deficit  Goal: Patient/family/caregiver demonstrates understanding of disease process, treatment plan, medications, and discharge instructions  Description  Complete learning assessment and assess knowledge base    Interventions:  - Provide teaching at level of understanding  - Provide teaching via preferred learning methods  Outcome: Progressing     Problem: METABOLIC, FLUID AND ELECTROLYTES - ADULT  Goal: Fluid balance maintained  Description  INTERVENTIONS:  - Monitor labs and assess for signs and symptoms of volume excess or deficit  - Monitor I/O and WT  - Instruct patient on fluid and nutrition as appropriate  Outcome: Progressing     Problem: SKIN/TISSUE INTEGRITY - ADULT  Goal: Skin integrity remains intact  Description  INTERVENTIONS  - Identify patients at risk for skin breakdown  - Assess and monitor skin integrity  - Assess and monitor nutrition and hydration status  - Monitor labs (i e  albumin)  - Assess for incontinence   - Turn and reposition patient  - Assist with mobility/ambulation  - Relieve pressure over bony prominences  - Avoid friction and shearing  - Provide appropriate hygiene as needed including keeping skin clean and dry  - Evaluate need for skin moisturizer/barrier cream  - Collaborate with interdisciplinary team (i e  Nutrition, Rehabilitation, etc )   - Patient/family teaching  Outcome: Progressing     Problem: MUSCULOSKELETAL - ADULT  Goal: Maintain or return mobility to safest level of function  Description  INTERVENTIONS:  - Assess patient's ability to carry out ADLs; assess patient's baseline for ADL function and identify physical deficits which impact ability to perform ADLs (bathing, care of mouth/teeth, toileting, grooming, dressing, etc )  - Assess/evaluate cause of self-care deficits   - Assess range of motion  - Assess patient's mobility; develop plan if impaired  - Assess patient's need for assistive devices and provide as appropriate  - Encourage maximum independence but intervene and supervise when necessary  - Involve family in performance of ADLs  - Assess for home care needs following discharge   - Request OT consult to assist with ADL evaluation and planning for discharge  - Provide patient education as appropriate  Outcome: Progressing     Problem: DISCHARGE PLANNING - CARE MANAGEMENT  Goal: Discharge to post-acute care or home with appropriate resources  Description  INTERVENTIONS:  - Conduct assessment to determine patient/family and health care team treatment goals, and need for post-acute services based on payer coverage, community resources, and patient preferences, and barriers to discharge  - Address psychosocial, clinical, and financial barriers to discharge as identified in assessment in conjunction with the patient/family and health care team  - Arrange appropriate level of post-acute services according to patients   needs and preference and payer coverage in collaboration with the physician and health care team  - Communicate with and update the patient/family, physician, and health care team regarding progress on the discharge plan  - Arrange appropriate transportation to post-acute venues  Outcome: Progressing

## 2019-07-18 NOTE — APP STUDENT NOTE
[facility] Progress Note  Patient: Pepe Rowe [de-identified] y o  male   MRN: [id number]  PCP: Rochelle Trejo PA-C  Unit/Bed#: E4 -68 Encounter: [id number]  Date Of Visit: 07/18/19    Assessment:    Principal Problem:    Periorbital edema  Active Problems:    Chronic systolic congestive heart failure (HCC)    Renal disorder    GERD (gastroesophageal reflux disease)    Paroxysmal atrial fibrillation (HCC)    HTN (hypertension)    Acute renal failure superimposed on stage 4 chronic kidney disease (HCC)    CKD (chronic kidney disease), stage IV (HCC)    Secondary hyperparathyroidism ([facility] [address] )    Hypercalcemia    BPH (benign prostatic hyperplasia)    PAD (peripheral artery disease) ([facility])    Hyperkalemia    Age-related cognitive decline      Plan:    1  Periorbital edema  · Pt originally presented with worsening b/l eye redness and swelling  · CT showed bilateral periorbital cellulitis  Pt started on vancomycin and rocephin  · ID consult completed and noted sxs likely due to an allergic rxn as opposed to infectious cellulitis and d/c abx  · Ophthalmology consult completed and advised edema is likely due to allergic/contact dermatitis  D/c all ophthalmologic drops and consider systemic steroids for edema  · Swelling continues to improve - continue with cool compresses to b/l eyes and solumedrol BID  2  Macrocytic anemia  · Hgb trending down to 9 8 from 11 1   · B12 and folate pending  3  Social issues at home  · Pt recently moved to this area from [place], [place] and lives with one of his daughters  · He has been telling multiple hospital staff members that he "can't go back there" following d/c due to lack of appropriate care  · CM met with pt and daughter yesterday and determined pt will be d/c home to family once medically cleared  4  Age-related cognitive decline  · Neurology recommends outpatient neurocognitive testing      5  HTN  · BP stable - continue hydralazine, isosorbide and metoprolol  · Continue to monitor  6  Afib  · Rate controlled with metoprolol  · Anticoagulated with warfarin, INR 1 12    7  GERD  · Continue PPI    8  SHAHIDA superimposed on CKD stage IV  · Cr 3 72 on admission, 3 39 this AM  · Nephrology following - likely pre-renal  · Plan to restart lasix today  · Pending UA, bladder scan negative  · [place] neph[place] - he will need outpatient f/u locally pending d/c     9  CHF  · Managed through [facility] in [place], [place]  · Last echo completed there in 2/18/19, EF <30%  · Lasix held at admission 2/2 SHAHIDA with plan to restart today  · Continue to monitor I&Os and daily weights    10  Hypercalcemia  · Ca 10 4 on admission, 9 7 this AM  · Nephrology following - D/c calcium carbonate    11  Secondary hyperparathyroidism  · Likely 2/2 CKD, nephrology following   ·  7, phosphorus 4 5      VTE Pharmacologic Prophylaxis:   Pharmacologic: Warfarin (Coumadin)  Mechanical VTE Prophylaxis in Place: Yes    Patient Centered Rounds: I have performed bedside rounds with nursing staff today  Discussions with Specialists or Other Care Team Provider: none    Education and Discussions with Family / Patient: patient    Time Spent for Care: 30 minutes  More than 50% of total time spent on counseling and coordination of care as described above  Current Length of Stay: 3 day(s)    Current Patient Status: Inpatient   Certification Statement: The patient will continue to require additional inpatient hospital stay due to periorbital edema    Discharge Plan / Estimated Discharge Date: pending clinical course    Code Status: Level 1 - Full Code      Subjective:   [de-identified] yo M who is seen and examined on HD2 c/o intermittent discomfort around his eyes  He describes the sensation as a "burning, stinging" around his eyes and under the skin of his forehead and admits sxs are worse at night   He states he feels improved overall but the dry skin is bothering him more at this time  Pt reports the cool compresses tend to bother him more than the warm compresses but alternating them has been helpful for him  He is tolerating PO but denies having a BM today yet  He ambulated with PT assistance yesterday  Objective:     Vitals:   Temp (24hrs), Av 7 °F (36 5 °C), Min:97 2 °F (36 2 °C), Max:98 5 °F (36 9 °C)    Temp:  [97 2 °F (36 2 °C)-98 5 °F (36 9 °C)] 97 3 °F (36 3 °C)  HR:  [61-67] 63  Resp:  [18] 18  BP: (103-153)/(50-89) 152/80  SpO2:  [95 %-99 %] 97 %  Body mass index is 22 86 kg/m²  Input and Output Summary (last 24 hours): Intake/Output Summary (Last 24 hours) at 2019 0916  Last data filed at 2019 0841  Gross per 24 hour   Intake 440 ml   Output 1600 ml   Net -1160 ml       Physical Exam:     Physical Exam   Constitutional: He is oriented to person, place, and time  He appears well-developed and well-nourished  No distress  Lying in bed in NAD   HENT:   Head: Normocephalic and atraumatic  Nose: Nose normal    Mouth/Throat: Oropharynx is clear and moist    Eyes: Right eye exhibits no discharge  Left eye exhibits no discharge  Mild swelling of b/l upper and lower eye lids with dry, scaling skin  Improved overall   Neck: Normal range of motion  Neck supple  Cardiovascular: Normal rate, normal heart sounds and intact distal pulses  An irregularly irregular rhythm present  No murmur heard  Pulmonary/Chest: Effort normal and breath sounds normal  No respiratory distress  He has no wheezes  He has no rales  Abdominal: Soft  He exhibits no distension  There is no tenderness  Musculoskeletal: Normal range of motion  He exhibits no edema or tenderness  No b/l LE edema or calf tenderness  Lymphadenopathy:     He has no cervical adenopathy  Neurological: He is alert and oriented to person, place, and time  Skin: Skin is warm and dry  No rash noted  He is not diaphoretic  Psychiatric: He has a normal mood and affect  His behavior is normal    Nursing note and vitals reviewed  Additional Data:     Labs:    Results from last 7 days   Lab Units 07/17/19  0432 07/15/19  2140   WBC Thousand/uL 4 19* 5 88   HEMOGLOBIN g/dL 9 8* 11 1*   HEMATOCRIT % 31 1* 35 6*   PLATELETS Thousands/uL 182 205   NEUTROS PCT %  --  47   LYMPHS PCT %  --  33   MONOS PCT %  --  13*   EOS PCT %  --  6     Results from last 7 days   Lab Units 07/18/19  0456   POTASSIUM mmol/L 4 8   CHLORIDE mmol/L 104   CO2 mmol/L 21   BUN mg/dL 76*   CREATININE mg/dL 3 39*   CALCIUM mg/dL 9 7   ALK PHOS U/L 77   ALT U/L 11*   AST U/L 19     Results from last 7 days   Lab Units 07/17/19  0432   INR  1 12       * I Have Reviewed All Lab Data Listed Above  * Additional Pertinent Lab Tests Reviewed:  Adam Muñoz Admission Reviewed    Imaging:    Imaging Reports Reviewed Today Include: all  Imaging Personally Reviewed by Myself Includes:  all    Recent Cultures (last 7 days):           Last 24 Hours Medication List:     Current Facility-Administered Medications:  acetaminophen 650 mg Oral Q6H PRN Edward Olivares PA-C   atorvastatin 80 mg Oral Daily With JEFFERY Chambers   diphenhydrAMINE 25 mg Intravenous Q6H PRN Edward Olivares PA-C   gabapentin 300 mg Oral Daily Edward Olivares PA-C   hydrALAZINE 50 mg Oral Q8H [address] AUDREY Villalobos   isosorbide dinitrate 20 mg Oral TID after meals AUDREY Villalobos   methylPREDNISolone sodium succinate 20 mg Intravenous Q12H [address] Moise Mooney DO   metoprolol succinate 25 mg Oral Daily AUDREY Villalobos   ondansetron 4 mg Intravenous Q6H PRN Edward Olivares PA-C   pantoprazole 40 mg Oral Early Morning Edward Olivares PA-C   tamsulosin 0 8 mg Oral Daily With JEFFERY Chambers   warfarin 3 mg Oral Daily (warfarin) Ara Kramer DO        Today, Patient Was Seen By: KERRI Tidwell    7/18/2019  9:16 AM

## 2019-07-19 LAB
ANION GAP SERPL CALCULATED.3IONS-SCNC: 12 MMOL/L (ref 4–13)
BACTERIA UR QL AUTO: NORMAL /HPF
BILIRUB UR QL STRIP: NEGATIVE
BUN SERPL-MCNC: 81 MG/DL (ref 5–25)
CALCIUM SERPL-MCNC: 9.8 MG/DL (ref 8.3–10.1)
CHLORIDE SERPL-SCNC: 104 MMOL/L (ref 100–108)
CLARITY UR: CLEAR
CO2 SERPL-SCNC: 22 MMOL/L (ref 21–32)
COLOR UR: NORMAL
CREAT SERPL-MCNC: 3.33 MG/DL (ref 0.6–1.3)
GFR SERPL CREATININE-BSD FRML MDRD: 17 ML/MIN/1.73SQ M
GLUCOSE SERPL-MCNC: 134 MG/DL (ref 65–140)
GLUCOSE UR STRIP-MCNC: NEGATIVE MG/DL
HGB UR QL STRIP.AUTO: NEGATIVE
INR PPP: 1.11 (ref 0.84–1.19)
KETONES UR STRIP-MCNC: NEGATIVE MG/DL
LEUKOCYTE ESTERASE UR QL STRIP: NEGATIVE
NITRITE UR QL STRIP: NEGATIVE
NON-SQ EPI CELLS URNS QL MICRO: NORMAL /HPF
PH UR STRIP.AUTO: 5.5 [PH]
POTASSIUM SERPL-SCNC: 4.6 MMOL/L (ref 3.5–5.3)
PROT UR STRIP-MCNC: NEGATIVE MG/DL
PROTHROMBIN TIME: 14.4 SECONDS (ref 11.6–14.5)
RBC #/AREA URNS AUTO: NORMAL /HPF
SODIUM SERPL-SCNC: 138 MMOL/L (ref 136–145)
SP GR UR STRIP.AUTO: 1.01 (ref 1–1.03)
UROBILINOGEN UR QL STRIP.AUTO: 0.2 E.U./DL
WBC #/AREA URNS AUTO: NORMAL /HPF

## 2019-07-19 PROCEDURE — 81001 URINALYSIS AUTO W/SCOPE: CPT | Performed by: NURSE PRACTITIONER

## 2019-07-19 PROCEDURE — 80048 BASIC METABOLIC PNL TOTAL CA: CPT | Performed by: INTERNAL MEDICINE

## 2019-07-19 PROCEDURE — 99232 SBSQ HOSP IP/OBS MODERATE 35: CPT | Performed by: INTERNAL MEDICINE

## 2019-07-19 PROCEDURE — 85610 PROTHROMBIN TIME: CPT | Performed by: INTERNAL MEDICINE

## 2019-07-19 RX ORDER — DIAPER,BRIEF,INFANT-TODD,DISP
EACH MISCELLANEOUS 4 TIMES DAILY PRN
Status: DISCONTINUED | OUTPATIENT
Start: 2019-07-19 | End: 2019-07-21 | Stop reason: HOSPADM

## 2019-07-19 RX ADMIN — DIPHENHYDRAMINE HYDROCHLORIDE 25 MG: 50 INJECTION, SOLUTION INTRAMUSCULAR; INTRAVENOUS at 15:53

## 2019-07-19 RX ADMIN — FUROSEMIDE 40 MG: 40 TABLET ORAL at 08:32

## 2019-07-19 RX ADMIN — METOPROLOL SUCCINATE 25 MG: 25 TABLET, EXTENDED RELEASE ORAL at 08:32

## 2019-07-19 RX ADMIN — ISOSORBIDE DINITRATE 20 MG: 10 TABLET ORAL at 08:32

## 2019-07-19 RX ADMIN — GABAPENTIN 300 MG: 300 CAPSULE ORAL at 08:32

## 2019-07-19 RX ADMIN — PREDNISONE 40 MG: 20 TABLET ORAL at 08:33

## 2019-07-19 RX ADMIN — ATORVASTATIN CALCIUM 80 MG: 80 TABLET, FILM COATED ORAL at 18:10

## 2019-07-19 RX ADMIN — HYDRALAZINE HYDROCHLORIDE 50 MG: 25 TABLET ORAL at 21:52

## 2019-07-19 RX ADMIN — HYDRALAZINE HYDROCHLORIDE 50 MG: 25 TABLET ORAL at 06:21

## 2019-07-19 RX ADMIN — FUROSEMIDE 40 MG: 40 TABLET ORAL at 15:56

## 2019-07-19 RX ADMIN — HYDRALAZINE HYDROCHLORIDE 50 MG: 25 TABLET ORAL at 15:56

## 2019-07-19 RX ADMIN — ISOSORBIDE DINITRATE 20 MG: 10 TABLET ORAL at 12:58

## 2019-07-19 RX ADMIN — PANTOPRAZOLE SODIUM 40 MG: 40 TABLET, DELAYED RELEASE ORAL at 06:21

## 2019-07-19 RX ADMIN — ISOSORBIDE DINITRATE 20 MG: 10 TABLET ORAL at 18:10

## 2019-07-19 RX ADMIN — TAMSULOSIN HYDROCHLORIDE 0.8 MG: 0.4 CAPSULE ORAL at 15:56

## 2019-07-19 RX ADMIN — WARFARIN SODIUM 3 MG: 3 TABLET ORAL at 18:10

## 2019-07-19 NOTE — PLAN OF CARE
Problem: Potential for Falls  Goal: Patient will remain free of falls  Description  INTERVENTIONS:  - Assess patient frequently for physical needs  -  Identify cognitive and physical deficits and behaviors that affect risk of falls    -  [place] fall precautions as indicated by assessment   - Educate patient/family on patient safety including physical limitations  - Instruct patient to call for assistance with activity based on assessment  - Modify environment to reduce risk of injury  - Consider OT/PT consult to assist with strengthening/mobility  Outcome: Progressing     Problem: Prexisting or High Potential for Compromised Skin Integrity  Goal: Skin integrity is maintained or improved  Description  INTERVENTIONS:  - Identify patients at risk for skin breakdown  - Assess and monitor skin integrity  - Assess and monitor nutrition and hydration status  - Monitor labs (i e  albumin)  - Assess for incontinence   - Turn and reposition patient  - Assist with mobility/ambulation  - Relieve pressure over bony prominences  - Avoid friction and shearing  - Provide appropriate hygiene as needed including keeping skin clean and dry  - Evaluate need for skin moisturizer/barrier cream  - Collaborate with interdisciplinary team (i e  Nutrition, Rehabilitation, etc )   - Patient/family teaching  Outcome: Progressing     Problem: PAIN - ADULT  Goal: Verbalizes/displays adequate comfort level or baseline comfort level  Description  Interventions:  - Encourage patient to monitor pain and request assistance  - Assess pain using appropriate pain scale  - Administer analgesics based on type and severity of pain and evaluate response  - Implement non-pharmacological measures as appropriate and evaluate response  - Consider cultural and social influences on pain and pain management  - Notify physician/advanced practitioner if interventions unsuccessful or patient reports new pain  Outcome: Progressing     Problem: INFECTION - ADULT  Goal: Absence or prevention of progression during hospitalization  Description  INTERVENTIONS:  - Assess and monitor for signs and symptoms of infection  - Monitor lab/diagnostic results  - Monitor all insertion sites, i e  indwelling lines, tubes, and drains  - Monitor endotracheal (as able) and nasal secretions for changes in amount and color  - [place] appropriate cooling/warming therapies per order  - Administer medications as ordered  - Instruct and encourage patient and family to use good hand hygiene technique  - Identify and instruct in appropriate isolation precautions for identified infection/condition  Outcome: Progressing     Problem: SAFETY ADULT  Goal: Maintain or return to baseline ADL function  Description  INTERVENTIONS:  -  Assess patient's ability to carry out ADLs; assess patient's baseline for ADL function and identify physical deficits which impact ability to perform ADLs (bathing, care of mouth/teeth, toileting, grooming, dressing, etc )  - Assess/evaluate cause of self-care deficits   - Assess range of motion  - Assess patient's mobility; develop plan if impaired  - Assess patient's need for assistive devices and provide as appropriate  - Encourage maximum independence but intervene and supervise when necessary  ¯ Involve family in performance of ADLs  ¯ Assess for home care needs following discharge   ¯ Request OT consult to assist with ADL evaluation and planning for discharge  ¯ Provide patient education as appropriate  Outcome: Progressing  Goal: Maintain or return mobility status to optimal level  Description  INTERVENTIONS:  - Assess patient's baseline mobility status (ambulation, transfers, stairs, etc )    - Identify cognitive and physical deficits and behaviors that affect mobility  - Identify mobility aids required to assist with transfers and/or ambulation (gait belt, sit-to-stand, lift, walker, cane, etc )  - [place] fall precautions as indicated by assessment  - Record patient progress and toleration of activity level on Mobility SBAR; progress patient to next Phase/Stage  - Instruct patient to call for assistance with activity based on assessment  - Request Rehabilitation consult to assist with strengthening/weightbearing, etc   Outcome: Progressing     Problem: DISCHARGE PLANNING  Goal: Discharge to home or other facility with appropriate resources  Description  INTERVENTIONS:  - Identify barriers to discharge w/patient and caregiver  - Arrange for needed discharge resources and transportation as appropriate  - Identify discharge learning needs (meds, wound care, etc )  - Arrange for interpretive services to assist at discharge as needed  - Refer to Case Management Department for coordinating discharge planning if the patient needs post-hospital services based on physician/advanced practitioner order or complex needs related to functional status, cognitive ability, or social support system  Outcome: Progressing     Problem: Knowledge Deficit  Goal: Patient/family/caregiver demonstrates understanding of disease process, treatment plan, medications, and discharge instructions  Description  Complete learning assessment and assess knowledge base    Interventions:  - Provide teaching at level of understanding  - Provide teaching via preferred learning methods  Outcome: Progressing     Problem: METABOLIC, FLUID AND ELECTROLYTES - ADULT  Goal: Fluid balance maintained  Description  INTERVENTIONS:  - Monitor labs and assess for signs and symptoms of volume excess or deficit  - Monitor I/O and WT  - Instruct patient on fluid and nutrition as appropriate  Outcome: Progressing     Problem: SKIN/TISSUE INTEGRITY - ADULT  Goal: Skin integrity remains intact  Description  INTERVENTIONS  - Identify patients at risk for skin breakdown  - Assess and monitor skin integrity  - Assess and monitor nutrition and hydration status  - Monitor labs (i e  albumin)  - Assess for incontinence   - Turn and reposition patient  - Assist with mobility/ambulation  - Relieve pressure over bony prominences  - Avoid friction and shearing  - Provide appropriate hygiene as needed including keeping skin clean and dry  - Evaluate need for skin moisturizer/barrier cream  - Collaborate with interdisciplinary team (i e  Nutrition, Rehabilitation, etc )   - Patient/family teaching  Outcome: Progressing     Problem: MUSCULOSKELETAL - ADULT  Goal: Maintain or return mobility to safest level of function  Description  INTERVENTIONS:  - Assess patient's ability to carry out ADLs; assess patient's baseline for ADL function and identify physical deficits which impact ability to perform ADLs (bathing, care of mouth/teeth, toileting, grooming, dressing, etc )  - Assess/evaluate cause of self-care deficits   - Assess range of motion  - Assess patient's mobility; develop plan if impaired  - Assess patient's need for assistive devices and provide as appropriate  - Encourage maximum independence but intervene and supervise when necessary  - Involve family in performance of ADLs  - Assess for home care needs following discharge   - Request OT consult to assist with ADL evaluation and planning for discharge  - Provide patient education as appropriate  Outcome: Progressing     Problem: DISCHARGE PLANNING - CARE MANAGEMENT  Goal: Discharge to post-acute care or home with appropriate resources  Description  INTERVENTIONS:  - Conduct assessment to determine patient/family and health care team treatment goals, and need for post-acute services based on payer coverage, community resources, and patient preferences, and barriers to discharge  - Address psychosocial, clinical, and financial barriers to discharge as identified in assessment in conjunction with the patient/family and health care team  - Arrange appropriate level of post-acute services according to patients   needs and preference and payer coverage in collaboration with the physician and health care team  - Communicate with and update the patient/family, physician, and health care team regarding progress on the discharge plan  - Arrange appropriate transportation to post-acute venues  Outcome: Progressing

## 2019-07-19 NOTE — PROGRESS NOTES
Progress Note Nilsa Saravia [de-identified] y o  male MRN: [id number]    Unit/Bed#: E4 -01 Encounter: [id number]      Subjective: The patient is having increased itching and discomfort around his eyes  He is afraid that he is having a relapse  He has no chest pain, shortness of breath, palpitations, abdominal pain, nausea, or vomiting  He does have pain in his right leg which is a chronic problem  Physical Exam:   Temp:  [97 6 °F (36 4 °C)-98 3 °F (36 8 °C)] 97 6 °F (36 4 °C)  HR:  [59-66] 60  Resp:  [18] 18  BP: ()/(57-81) 157/81    Gen:  Well-developed, well-nourished, in no distress  Neck:  Supple  No lymphadenopathy, goiter, or bruit  Heart:  Regular rhythm  No murmur, gallop, or rub  Lungs:  Clear to auscultation and percussion  No wheezing, rales, or rhonchi    Abd:  Soft with active bowel sounds  No mass, tenderness, or organomegaly  Extremities:  No clubbing, cyanosis, or edema  No calf tenderness  Neuro:  Alert and oriented  No focal sign  Skin:  Warm and dry  There is some scaling around the orbits bilaterally  There is no redness or warmth  LABS:    CMP:   Lab Results   Component Value Date    SODIUM 138 07/19/2019    K 4 6 07/19/2019     07/19/2019    CO2 22 07/19/2019    BUN 81 (H) 07/19/2019    CREATININE 3 33 (H) 07/19/2019    CALCIUM 9 8 07/19/2019    EGFR 17 07/19/2019           Patient Active Problem List   Diagnosis    Periorbital edema    Chronic systolic congestive heart failure (HCC)    Renal disorder    GERD (gastroesophageal reflux disease)    Paroxysmal atrial fibrillation (HCC)    HTN (hypertension)    Acute renal failure superimposed on stage 4 chronic kidney disease (HCC)    CKD (chronic kidney disease), stage IV ([address] )    Secondary hyperparathyroidism ([facility] [address] )    Hypercalcemia    BPH (benign prostatic hyperplasia)    PAD (peripheral artery disease) (HCC)    Hyperkalemia    Age-related cognitive decline       Assessment/Plan:  1   Periorbital redness likely contact dermatitis  2  Acute renal failure superimposed on chronic kidney disease stage 4, improved  3  Age-related cognitive decline  4  Peripheral vascular disease  5  Hyperkalemia, resolved  6  Hypertension   7  Paroxysmal atrial fibrillation, on metoprolol and warfarin  8  Chronic systolic congestive heart failure, adequately compensated    Overall it seems that the patient has improved  I will add hydrocortisone topically to his rash  The patient does not desire rehab although he seems functionally borderline to be at home  Assuming things are stable, discharge may be feasible tomorrow      VTE Pharmacologic Prophylaxis: Warfarin (Coumadin)  VTE Mechanical Prophylaxis: reason for no mechanical VTE prophylaxis Therapeutically anticoagulated

## 2019-07-19 NOTE — SOCIAL WORK
Late note for 7/18/19: CM met with patient and his daughter  Patient refused STR  Patient and daughter agreeable to VNA  Cm offered choice daughter wanted [facility]  CM explained they may not accept referral and requested back up choices of [facility], [facility] and [facility]  Daughter stated she wanted [facility] as first choice because PCP with with [facility] she stated she dis not care which alternative VNA CM used if [facility] declined referral  Referral was sent  Daughter was given application for housing and applications for [employer] and [facility] [address]  IMM letter was explained, given and signed  Plan for d/c 7/19/2019

## 2019-07-19 NOTE — PLAN OF CARE
Problem: Potential for Falls  Goal: Patient will remain free of falls  Description  INTERVENTIONS:  - Assess patient frequently for physical needs  -  Identify cognitive and physical deficits and behaviors that affect risk of falls    -  [place] fall precautions as indicated by assessment   - Educate patient/family on patient safety including physical limitations  - Instruct patient to call for assistance with activity based on assessment  - Modify environment to reduce risk of injury  - Consider OT/PT consult to assist with strengthening/mobility  Outcome: Progressing     Problem: Prexisting or High Potential for Compromised Skin Integrity  Goal: Skin integrity is maintained or improved  Description  INTERVENTIONS:  - Identify patients at risk for skin breakdown  - Assess and monitor skin integrity  - Assess and monitor nutrition and hydration status  - Monitor labs (i e  albumin)  - Assess for incontinence   - Turn and reposition patient  - Assist with mobility/ambulation  - Relieve pressure over bony prominences  - Avoid friction and shearing  - Provide appropriate hygiene as needed including keeping skin clean and dry  - Evaluate need for skin moisturizer/barrier cream  - Collaborate with interdisciplinary team (i e  Nutrition, Rehabilitation, etc )   - Patient/family teaching  Outcome: Progressing     Problem: PAIN - ADULT  Goal: Verbalizes/displays adequate comfort level or baseline comfort level  Description  Interventions:  - Encourage patient to monitor pain and request assistance  - Assess pain using appropriate pain scale  - Administer analgesics based on type and severity of pain and evaluate response  - Implement non-pharmacological measures as appropriate and evaluate response  - Consider cultural and social influences on pain and pain management  - Notify physician/advanced practitioner if interventions unsuccessful or patient reports new pain  Outcome: Progressing     Problem: INFECTION - ADULT  Goal: Absence or prevention of progression during hospitalization  Description  INTERVENTIONS:  - Assess and monitor for signs and symptoms of infection  - Monitor lab/diagnostic results  - Monitor all insertion sites, i e  indwelling lines, tubes, and drains  - Monitor endotracheal (as able) and nasal secretions for changes in amount and color  - [place] appropriate cooling/warming therapies per order  - Administer medications as ordered  - Instruct and encourage patient and family to use good hand hygiene technique  - Identify and instruct in appropriate isolation precautions for identified infection/condition  Outcome: Progressing     Problem: SAFETY ADULT  Goal: Maintain or return to baseline ADL function  Description  INTERVENTIONS:  -  Assess patient's ability to carry out ADLs; assess patient's baseline for ADL function and identify physical deficits which impact ability to perform ADLs (bathing, care of mouth/teeth, toileting, grooming, dressing, etc )  - Assess/evaluate cause of self-care deficits   - Assess range of motion  - Assess patient's mobility; develop plan if impaired  - Assess patient's need for assistive devices and provide as appropriate  - Encourage maximum independence but intervene and supervise when necessary  ¯ Involve family in performance of ADLs  ¯ Assess for home care needs following discharge   ¯ Request OT consult to assist with ADL evaluation and planning for discharge  ¯ Provide patient education as appropriate  Outcome: Progressing  Goal: Maintain or return mobility status to optimal level  Description  INTERVENTIONS:  - Assess patient's baseline mobility status (ambulation, transfers, stairs, etc )    - Identify cognitive and physical deficits and behaviors that affect mobility  - Identify mobility aids required to assist with transfers and/or ambulation (gait belt, sit-to-stand, lift, walker, cane, etc )  - [place] fall precautions as indicated by assessment  - Record patient progress and toleration of activity level on Mobility SBAR; progress patient to next Phase/Stage  - Instruct patient to call for assistance with activity based on assessment  - Request Rehabilitation consult to assist with strengthening/weightbearing, etc   Outcome: Progressing     Problem: DISCHARGE PLANNING  Goal: Discharge to home or other facility with appropriate resources  Description  INTERVENTIONS:  - Identify barriers to discharge w/patient and caregiver  - Arrange for needed discharge resources and transportation as appropriate  - Identify discharge learning needs (meds, wound care, etc )  - Arrange for interpretive services to assist at discharge as needed  - Refer to Case Management Department for coordinating discharge planning if the patient needs post-hospital services based on physician/advanced practitioner order or complex needs related to functional status, cognitive ability, or social support system  Outcome: Progressing     Problem: Knowledge Deficit  Goal: Patient/family/caregiver demonstrates understanding of disease process, treatment plan, medications, and discharge instructions  Description  Complete learning assessment and assess knowledge base    Interventions:  - Provide teaching at level of understanding  - Provide teaching via preferred learning methods  Outcome: Progressing     Problem: METABOLIC, FLUID AND ELECTROLYTES - ADULT  Goal: Fluid balance maintained  Description  INTERVENTIONS:  - Monitor labs and assess for signs and symptoms of volume excess or deficit  - Monitor I/O and WT  - Instruct patient on fluid and nutrition as appropriate  Outcome: Progressing     Problem: SKIN/TISSUE INTEGRITY - ADULT  Goal: Skin integrity remains intact  Description  INTERVENTIONS  - Identify patients at risk for skin breakdown  - Assess and monitor skin integrity  - Assess and monitor nutrition and hydration status  - Monitor labs (i e  albumin)  - Assess for incontinence   - Turn and reposition patient  - Assist with mobility/ambulation  - Relieve pressure over bony prominences  - Avoid friction and shearing  - Provide appropriate hygiene as needed including keeping skin clean and dry  - Evaluate need for skin moisturizer/barrier cream  - Collaborate with interdisciplinary team (i e  Nutrition, Rehabilitation, etc )   - Patient/family teaching  Outcome: Progressing     Problem: MUSCULOSKELETAL - ADULT  Goal: Maintain or return mobility to safest level of function  Description  INTERVENTIONS:  - Assess patient's ability to carry out ADLs; assess patient's baseline for ADL function and identify physical deficits which impact ability to perform ADLs (bathing, care of mouth/teeth, toileting, grooming, dressing, etc )  - Assess/evaluate cause of self-care deficits   - Assess range of motion  - Assess patient's mobility; develop plan if impaired  - Assess patient's need for assistive devices and provide as appropriate  - Encourage maximum independence but intervene and supervise when necessary  - Involve family in performance of ADLs  - Assess for home care needs following discharge   - Request OT consult to assist with ADL evaluation and planning for discharge  - Provide patient education as appropriate  Outcome: Progressing     Problem: DISCHARGE PLANNING - CARE MANAGEMENT  Goal: Discharge to post-acute care or home with appropriate resources  Description  INTERVENTIONS:  - Conduct assessment to determine patient/family and health care team treatment goals, and need for post-acute services based on payer coverage, community resources, and patient preferences, and barriers to discharge  - Address psychosocial, clinical, and financial barriers to discharge as identified in assessment in conjunction with the patient/family and health care team  - Arrange appropriate level of post-acute services according to patients   needs and preference and payer coverage in collaboration with the physician and health care team  - Communicate with and update the patient/family, physician, and health care team regarding progress on the discharge plan  - Arrange appropriate transportation to post-acute venues  Outcome: Progressing

## 2019-07-19 NOTE — SOCIAL WORK
[facility] declined referral   SW made a referral to [facility] and they have accepted the patient  SW notified the pt and left a VM message for pt's daughter, Leidy Sotomayor indicating the same  Pt is agreeable to [facility]  Pt does feel safe at home and did not have any concerns

## 2019-07-19 NOTE — PHYSICAL THERAPY NOTE
Physical Therapy Cancellation Note      PT arrived to patient's room with patient reporting "I just got to lay down, I don't want to do anything right now, I'll be up to it tomorrow " Patient reporting "itchy" in bilateral eyes, but did just receive medication  Follow-up with pt as india Olson, PT

## 2019-07-19 NOTE — PROGRESS NOTES
NEPHROLOGY PROGRESS NOTE   Kaz Schwab [de-identified] y o  male MRN: [id number]  Unit/Bed#: E4 -01 Encounter: [id number]      ASSESSMENT & PLAN:    79-year-old male with a past medical history of stage 4 chronic kidney disease, chronic systolic congestive heart failure, who presented with periorbital cellulitis    1  Acute kidney injury present on admission likely secondary to pre renal azotemia  2  Stage 4 chronic kidney disease with a baseline creatinine in the mid 2s to low 3  3  Ischemic cardiomyopathy   4  Hypertension  5  CKD bone mineral disease    -overall renal function is at baseline  -periorbital cellulitis has improved, still with agent  -Blood pressures are acceptable  -diuretics re-initiated  -was schedule outpatient follow-up upon discharge    SUBJECTIVE:    Patient was seen today feeling better tolerating p o   Intake still with some pain in the eyes, pruritus, but overall stable    OBJECTIVE:  Current Weight: Weight - Scale: 66 1 kg (145 lb 11 6 oz)  Vitals:    07/19/19 0701   BP: 148/70   Pulse: 66   Resp: 18   Temp: 97 6 °F (36 4 °C)   SpO2: 96%       Intake/Output Summary (Last 24 hours) at 7/19/2019 1108  Last data filed at 7/19/2019 0701  Gross per 24 hour   Intake 600 ml   Output 1675 ml   Net -1075 ml       General: conscious, cooperative, in not acute distress  Eyes: conjunctivae pink, anicteric sclerae  ENT: lips and mucous membranes moist  Neck: supple, no JVD  Chest: clear breath sounds bilateral, no crackles, ronchus or wheezings  CVS: distinct S1 & S2, normal rate, regular rhythm  Abdomen: soft, non-tender, non-distended, normoactive bowel sounds  Extremities: no edema of both legs  Skin: no rash  Neuro: awake, alert, oriented      Medications:    Current Facility-Administered Medications:     acetaminophen (TYLENOL) tablet 650 mg, 650 mg, Oral, Q6H PRN, Yaron Parikh PA-C, 650 mg at 07/18/19 0915    atorvastatin (LIPITOR) tablet 80 mg, 80 mg, Oral, Daily With [employer], JEFFERY, 80 mg at 07/18/19 1657    diphenhydrAMINE (BENADRYL) injection 25 mg, 25 mg, Intravenous, Q6H PRN, Nikita Dove PA-C, 25 mg at 07/17/19 2212    furosemide (LASIX) tablet 40 mg, 40 mg, Oral, BID (diuretic), Windy Pain, DO, 40 mg at 07/19/19 0376    gabapentin (NEURONTIN) capsule 300 mg, 300 mg, Oral, Daily, Nikita Dove PA-C, 300 mg at 07/19/19 4393    hydrALAZINE (APRESOLINE) tablet 50 mg, 50 mg, Oral, Q8H [address], AUDREY Denson, 50 mg at 07/19/19 2731    isosorbide dinitrate (ISORDIL) tablet 20 mg, 20 mg, Oral, TID after meals, AUDREY Denson, 20 mg at 07/19/19 5736    metoprolol succinate (TOPROL-XL) 24 hr tablet 25 mg, 25 mg, Oral, Daily, AUDREY Denson, 25 mg at 07/19/19 0832    ondansetron (ZOFRAN) injection 4 mg, 4 mg, Intravenous, Q6H PRN, Nikita Dove PA-C    pantoprazole (PROTONIX) EC tablet 40 mg, 40 mg, Oral, Early Morning, Nikita Dove PA-C, 40 mg at 07/19/19 8230    predniSONE tablet 40 mg, 40 mg, Oral, Daily, 40 mg at 07/19/19 0833 **FOLLOWED BY** [START ON 7/21/2019] predniSONE tablet 30 mg, 30 mg, Oral, Daily **FOLLOWED BY** [START ON 7/23/2019] predniSONE tablet 20 mg, 20 mg, Oral, Daily **FOLLOWED BY** [START ON 7/25/2019] predniSONE tablet 10 mg, 10 mg, Oral, Daily, Moise Mooney DO    tamsulosin (FLOMAX) capsule 0 8 mg, 0 8 mg, Oral, Daily With Collette Villela PA-C, 0 8 mg at 07/18/19 1657    warfarin (COUMADIN) tablet 3 mg, 3 mg, Oral, Daily (warfarin), Moise Mooney DO, 3 mg at 07/18/19 1658    Invasive Devices:      Lab Results:   Results from last 7 days   Lab Units 07/19/19  0432 07/18/19  0456 07/17/19  0432 07/15/19  2140   WBC Thousand/uL  --   --  4 19* 5 88   HEMOGLOBIN g/dL  --   --  9 8* 11 1*   HEMATOCRIT %  --   --  31 1* 35 6*   PLATELETS Thousands/uL  --   --  182 205   POTASSIUM mmol/L 4 6 4 8 4 3 5 1   CHLORIDE mmol/L 104 104 107 102   CO2 mmol/L 22 21 22 22   BUN mg/dL 81* 76* 63* 67*   CREATININE mg/dL 3 33* 3 39* 3 33* 3 72*   CALCIUM mg/dL 9 8 9 7 9 6 10 4*   PHOSPHORUS mg/dL  --   --  4 5*  --    ALK PHOS U/L  --  77  --  84   ALT U/L  --  11*  --  13   AST U/L  --  19  --  49*       Previous work up:  Please see previous notes

## 2019-07-20 LAB
ANION GAP SERPL CALCULATED.3IONS-SCNC: 12 MMOL/L (ref 4–13)
BUN SERPL-MCNC: 78 MG/DL (ref 5–25)
CALCIUM SERPL-MCNC: 9.6 MG/DL (ref 8.3–10.1)
CHLORIDE SERPL-SCNC: 104 MMOL/L (ref 100–108)
CO2 SERPL-SCNC: 22 MMOL/L (ref 21–32)
CREAT SERPL-MCNC: 3.13 MG/DL (ref 0.6–1.3)
ERYTHROCYTE [DISTWIDTH] IN BLOOD BY AUTOMATED COUNT: 12.6 % (ref 11.6–15.1)
GFR SERPL CREATININE-BSD FRML MDRD: 18 ML/MIN/1.73SQ M
GLUCOSE SERPL-MCNC: 114 MG/DL (ref 65–140)
HCT VFR BLD AUTO: 29.4 % (ref 36.5–49.3)
HGB BLD-MCNC: 9.3 G/DL (ref 12–17)
MCH RBC QN AUTO: 32 PG (ref 26.8–34.3)
MCHC RBC AUTO-ENTMCNC: 31.6 G/DL (ref 31.4–37.4)
MCV RBC AUTO: 101 FL (ref 82–98)
PLATELET # BLD AUTO: 195 THOUSANDS/UL (ref 149–390)
PMV BLD AUTO: 9.3 FL (ref 8.9–12.7)
POTASSIUM SERPL-SCNC: 3.7 MMOL/L (ref 3.5–5.3)
PROCALCITONIN SERPL-MCNC: <0.05 NG/ML
RBC # BLD AUTO: 2.91 MILLION/UL (ref 3.88–5.62)
SODIUM SERPL-SCNC: 138 MMOL/L (ref 136–145)
WBC # BLD AUTO: 8.32 THOUSAND/UL (ref 4.31–10.16)

## 2019-07-20 PROCEDURE — 99232 SBSQ HOSP IP/OBS MODERATE 35: CPT | Performed by: INTERNAL MEDICINE

## 2019-07-20 PROCEDURE — 80048 BASIC METABOLIC PNL TOTAL CA: CPT | Performed by: PHYSICIAN ASSISTANT

## 2019-07-20 PROCEDURE — 85027 COMPLETE CBC AUTOMATED: CPT | Performed by: PHYSICIAN ASSISTANT

## 2019-07-20 PROCEDURE — 84145 PROCALCITONIN (PCT): CPT | Performed by: INTERNAL MEDICINE

## 2019-07-20 RX ORDER — WARFARIN SODIUM 5 MG/1
5 TABLET ORAL
Status: DISCONTINUED | OUTPATIENT
Start: 2019-07-20 | End: 2019-07-21 | Stop reason: HOSPADM

## 2019-07-20 RX ADMIN — ATORVASTATIN CALCIUM 80 MG: 80 TABLET, FILM COATED ORAL at 17:10

## 2019-07-20 RX ADMIN — ISOSORBIDE DINITRATE 20 MG: 10 TABLET ORAL at 08:40

## 2019-07-20 RX ADMIN — ISOSORBIDE DINITRATE 20 MG: 10 TABLET ORAL at 12:56

## 2019-07-20 RX ADMIN — HYDROCORTISONE: 1 CREAM TOPICAL at 09:41

## 2019-07-20 RX ADMIN — TAMSULOSIN HYDROCHLORIDE 0.8 MG: 0.4 CAPSULE ORAL at 17:10

## 2019-07-20 RX ADMIN — ISOSORBIDE DINITRATE 20 MG: 10 TABLET ORAL at 17:10

## 2019-07-20 RX ADMIN — FUROSEMIDE 40 MG: 40 TABLET ORAL at 17:10

## 2019-07-20 RX ADMIN — HYDRALAZINE HYDROCHLORIDE 50 MG: 25 TABLET ORAL at 12:56

## 2019-07-20 RX ADMIN — PREDNISONE 40 MG: 20 TABLET ORAL at 08:40

## 2019-07-20 RX ADMIN — HYDRALAZINE HYDROCHLORIDE 50 MG: 25 TABLET ORAL at 05:40

## 2019-07-20 RX ADMIN — PANTOPRAZOLE SODIUM 40 MG: 40 TABLET, DELAYED RELEASE ORAL at 05:40

## 2019-07-20 RX ADMIN — FUROSEMIDE 40 MG: 40 TABLET ORAL at 08:40

## 2019-07-20 RX ADMIN — WARFARIN SODIUM 5 MG: 5 TABLET ORAL at 17:10

## 2019-07-20 RX ADMIN — HYDROCORTISONE: 1 CREAM TOPICAL at 00:08

## 2019-07-20 RX ADMIN — GABAPENTIN 300 MG: 300 CAPSULE ORAL at 08:40

## 2019-07-20 RX ADMIN — HYDROCORTISONE: 1 CREAM TOPICAL at 19:16

## 2019-07-20 RX ADMIN — METOPROLOL SUCCINATE 25 MG: 25 TABLET, EXTENDED RELEASE ORAL at 08:40

## 2019-07-20 NOTE — PROGRESS NOTES
Progress Note - Jose Barnett [de-identified] y o  male MRN: [id number]    Unit/Bed#: E4 -01 Encounter: [id number]    Assessment/Plan:    Ngoc orbital edema  appears more contact dermatitis, improving with steroids, recommended outpatient ophthalmology follow-up    A KI    creatinine 3 13 improved from 3 72 restarted Lasix, continue to monitor with Nephrology    Ambulatory dysfunction refusing inpatient rehab, question rehab at home     Ischemic cardiomyopathy EF 20 percent, restarted diuretic with metoprolol/hydralazine    AFib paroxysmal  rate control with metoprolol continue warfarin, increased to 5 milligrams desired INR greater than 2  Chronic systolic HF  appears compensated continue Lasix and metoprolol/hydralazine    Subjective:   eyes still itching and burning, denies chest pain shortness of breath nausea vomiting diarrhea no fevers chills appetite stable    Objective:     Vitals: Blood pressure 148/79, pulse 62, temperature 98 5 °F (36 9 °C), temperature source Tympanic, resp  rate 18, height 5' 7" (1 702 m), weight 66 kg (145 lb 8 1 oz), SpO2 94 %  ,Body mass index is 22 79 kg/m²  Results from last 7 days   Lab Units 07/20/19  1118 07/19/19  0432   WBC Thousand/uL 8 32  --    HEMOGLOBIN g/dL 9 3*  --    HEMATOCRIT % 29 4*  --    PLATELETS Thousands/uL 195  --    INR   --  1 11     Results from last 7 days   Lab Units 07/20/19  1118  07/18/19  0456   POTASSIUM mmol/L 3 7   < > 4 8   CHLORIDE mmol/L 104   < > 104   CO2 mmol/L 22   < > 21   BUN mg/dL 78*   < > 76*   CREATININE mg/dL 3 13*   < > 3 39*   CALCIUM mg/dL 9 6   < > 9 7   ALK PHOS U/L  --   --  77   ALT U/L  --   --  11*   AST U/L  --   --  19    < > = values in this interval not displayed         Scheduled Meds:    Current Facility-Administered Medications:  acetaminophen 650 mg Oral Q6H PRN Julio C Sandoval PA-C   atorvastatin 80 mg Oral Daily With ComJEFFERY quintana   diphenhydrAMINE 25 mg Intravenous Q6H PRN Julio C Sandoval PA-C furosemide 40 mg Oral BID (diuretic) Barbi Stands, DO   gabapentin 300 mg Oral Daily Simin Obrien PA-C   hydrALAZINE 50 mg Oral Q8H [address] AUDREY Donnelly   hydrocortisone  Topical 4x Daily PRN Travis Rene MD   isosorbide dinitrate 20 mg Oral TID after meals AUDREY Donnelly   metoprolol succinate 25 mg Oral Daily AUDREY Donnelly   ondansetron 4 mg Intravenous Q6H PRN Simin Obrien PA-C   pantoprazole 40 mg Oral Early Morning Simin Obrien PA-C   [START ON 7/21/2019] predniSONE 30 mg Oral Daily Barbi Stands, DO   Followed by       Ellie Rivera ON 7/23/2019] predniSONE 20 mg Oral Daily Moise Mooney DO   Followed by       Ellie Rivera ON 7/25/2019] predniSONE 10 mg Oral Daily Moise Mooney DO   tamsulosin 0 8 mg Oral Daily With JEFFERY Chambers   warfarin 3 mg Oral Daily (warfarin) Barbi Stands, DO       Continuous Infusions:     Physical exam:  General appearance:  Alert no distress interaction rambles on  Head/Eyes:  Nonicteric no erythema PERRL EOMI  Neck:  Supple  Lungs:  Decreased BS bilateral no wheezing rhonchi or rales  Heart: normal S1 S2 regular distant heart sounds  Abdomen: Soft nontender with bowel sounds  Extremities: no edema  Skin: no rash    Invasive Devices     Peripheral Intravenous Line            Peripheral IV 07/16/19 Right Antecubital 4 days                  VTE Pharmacologic Prophylaxis:  Warfarin        Counseling / Coordination of Care  Total floor / unit time spent today 30  minutes  Greater than 50% of total time was spent with the patient and / or family counseling and / or coordination of care    A description of the counseling / coordination of care:

## 2019-07-20 NOTE — PROGRESS NOTES
NEPHROLOGY PROGRESS NOTE   Jose Barnett [de-identified] y o  male MRN: [id number]  Unit/Bed#: E4 -01 Encounter: [id number]  Reason for Consult: SHAHIDA/CKD    ASSESSMENT/PLAN:  1  Acute Kidney Injury, POA- now resolved  - secondary to prerenal azotemia  - pending labs today  - UA bland  2  Chronic Kidney disease stage IV- Baseline creatinine is in the mid 2s to low 3s  - needs nephrology follow up in [facility] office on discharge  3  Hypertension- BP acceptable  4  Anemia- no iron deficiency / iron studies acceptable  5  BMD- PTH okay at 150, will follow as an outpatient  6  Volume Status- restarted lasix on 7/19  - appears euvolemic  - bmp for today pending  7  Periorbital contact dermatitis- per primary team      SUBJECTIVE:  Patient eating breakfast   Denies SOB  Denies acute complaints      OBJECTIVE:  Current Weight: Weight - Scale: 66 kg (145 lb 8 1 oz)  Vitals:    07/19/19 2310 07/20/19 0537 07/20/19 0539 07/20/19 0700   BP: 121/58  142/74 148/79   BP Location: Left arm  Left arm Left leg   Pulse: 60  60 62   Resp: 18   18   Temp: 98 3 °F (36 8 °C)   98 5 °F (36 9 °C)   TempSrc: Temporal   Tympanic   SpO2: 95%   94%   Weight:  66 kg (145 lb 8 1 oz)     Height:           Intake/Output Summary (Last 24 hours) at 7/20/2019 0908  Last data filed at 7/20/2019 0540  Gross per 24 hour   Intake 120 ml   Output 1750 ml   Net -1630 ml     General: NAD  Skin: no rash  HEENT: normocephalic  Neck: supple  Chest: CTAB  Heart: RRR  Abdomen: soft nt nd  Extremities: no edema  Neuro: alert awake  Psych: mood and affect appropriate    Medications:    Current Facility-Administered Medications:     acetaminophen (TYLENOL) tablet 650 mg, 650 mg, Oral, Q6H PRN, Branden Rojas, PA-C, 650 mg at 07/18/19 0915    atorvastatin (LIPITOR) tablet 80 mg, 80 mg, Oral, Daily With [employer], Branden Rojas, PA-C, 80 mg at 07/19/19 1810    diphenhydrAMINE (BENADRYL) injection 25 mg, 25 mg, Intravenous, Q6H PRN, Branden Rojas PA-C, 25 mg at 07/19/19 1553    furosemide (LASIX) tablet 40 mg, 40 mg, Oral, BID (diuretic), Moise Mooney DO, 40 mg at 07/20/19 0840    gabapentin (NEURONTIN) capsule 300 mg, 300 mg, Oral, Daily, Yaya Mata PA-C, 300 mg at 07/20/19 0840    hydrALAZINE (APRESOLINE) tablet 50 mg, 50 mg, Oral, Q8H [facility], AURDEY Franklin, 50 mg at 07/20/19 0540    hydrocortisone 1 % cream, , Topical, 4x Daily PRN, Anayeli Carrington MD    isosorbide dinitrate (ISORDIL) tablet 20 mg, 20 mg, Oral, TID after meals, AUDREY Franklin, 20 mg at 07/20/19 0840    metoprolol succinate (TOPROL-XL) 24 hr tablet 25 mg, 25 mg, Oral, Daily, AUDREY Franklin, 25 mg at 07/20/19 0840    ondansetron (ZOFRAN) injection 4 mg, 4 mg, Intravenous, Q6H PRN, Yaya Mata PA-C    pantoprazole (PROTONIX) EC tablet 40 mg, 40 mg, Oral, Early Morning, Yaya Mata PA-C, 40 mg at 07/20/19 0540    [COMPLETED] predniSONE tablet 40 mg, 40 mg, Oral, Daily, 40 mg at 07/20/19 0840 **FOLLOWED BY** [START ON 7/21/2019] predniSONE tablet 30 mg, 30 mg, Oral, Daily **FOLLOWED BY** [START ON 7/23/2019] predniSONE tablet 20 mg, 20 mg, Oral, Daily **FOLLOWED BY** [START ON 7/25/2019] predniSONE tablet 10 mg, 10 mg, Oral, Daily, Moise Mooney DO    tamsulosin (FLOMAX) capsule 0 8 mg, 0 8 mg, Oral, Daily With [employer], Yaya Mata PA-C, 0 8 mg at 07/19/19 1556    warfarin (COUMADIN) tablet 3 mg, 3 mg, Oral, Daily (warfarin), Jamie Betts DO, 3 mg at 07/19/19 1810    Laboratory Results:  Results from last 7 days   Lab Units 07/19/19  0432 07/18/19  0456 07/17/19  0432 07/15/19  2140   WBC Thousand/uL  --   --  4 19* 5 88   HEMOGLOBIN g/dL  --   --  9 8* 11 1*   HEMATOCRIT %  --   --  31 1* 35 6*   PLATELETS Thousands/uL  --   --  182 205   POTASSIUM mmol/L 4 6 4 8 4 3 5 1   CHLORIDE mmol/L 104 104 107 102   CO2 mmol/L 22 21 22 22   BUN mg/dL 81* 76* 63* 67*   CREATININE mg/dL 3 33* 3 39* 3 33* 3 72*   CALCIUM mg/dL 9 8 9 7 9 6 10 4*   PHOSPHORUS mg/dL  --   --  4 5*  --

## 2019-07-21 VITALS
OXYGEN SATURATION: 98 % | HEART RATE: 60 BPM | WEIGHT: 143.3 LBS | RESPIRATION RATE: 18 BRPM | SYSTOLIC BLOOD PRESSURE: 158 MMHG | HEIGHT: 67 IN | TEMPERATURE: 97 F | DIASTOLIC BLOOD PRESSURE: 75 MMHG | BODY MASS INDEX: 22.49 KG/M2

## 2019-07-21 LAB
ANION GAP SERPL CALCULATED.3IONS-SCNC: 10 MMOL/L (ref 4–13)
BUN SERPL-MCNC: 76 MG/DL (ref 5–25)
CALCIUM SERPL-MCNC: 9.9 MG/DL (ref 8.3–10.1)
CHLORIDE SERPL-SCNC: 104 MMOL/L (ref 100–108)
CO2 SERPL-SCNC: 25 MMOL/L (ref 21–32)
CREAT SERPL-MCNC: 2.85 MG/DL (ref 0.6–1.3)
GFR SERPL CREATININE-BSD FRML MDRD: 20 ML/MIN/1.73SQ M
GLUCOSE SERPL-MCNC: 99 MG/DL (ref 65–140)
INR PPP: 1.12 (ref 0.84–1.19)
POTASSIUM SERPL-SCNC: 3.8 MMOL/L (ref 3.5–5.3)
PROTHROMBIN TIME: 14.6 SECONDS (ref 11.6–14.5)
SODIUM SERPL-SCNC: 139 MMOL/L (ref 136–145)

## 2019-07-21 PROCEDURE — 80048 BASIC METABOLIC PNL TOTAL CA: CPT | Performed by: PHYSICIAN ASSISTANT

## 2019-07-21 PROCEDURE — 85610 PROTHROMBIN TIME: CPT | Performed by: INTERNAL MEDICINE

## 2019-07-21 PROCEDURE — 99239 HOSP IP/OBS DSCHRG MGMT >30: CPT | Performed by: INTERNAL MEDICINE

## 2019-07-21 PROCEDURE — 99232 SBSQ HOSP IP/OBS MODERATE 35: CPT | Performed by: INTERNAL MEDICINE

## 2019-07-21 RX ORDER — DIAPER,BRIEF,INFANT-TODD,DISP
EACH MISCELLANEOUS 4 TIMES DAILY PRN
Qty: 30 G | Refills: 0 | Status: SHIPPED | OUTPATIENT
Start: 2019-07-21 | End: 2019-08-20

## 2019-07-21 RX ORDER — PREDNISONE 10 MG/1
TABLET ORAL
Qty: 12 TABLET | Refills: 0 | Status: SHIPPED | OUTPATIENT
Start: 2019-07-21 | End: 2019-12-29

## 2019-07-21 RX ORDER — WARFARIN SODIUM 3 MG/1
3 TABLET ORAL
COMMUNITY

## 2019-07-21 RX ADMIN — PREDNISONE 30 MG: 20 TABLET ORAL at 09:20

## 2019-07-21 RX ADMIN — HYDROCORTISONE: 1 CREAM TOPICAL at 04:21

## 2019-07-21 RX ADMIN — GABAPENTIN 300 MG: 300 CAPSULE ORAL at 09:20

## 2019-07-21 RX ADMIN — PANTOPRAZOLE SODIUM 40 MG: 40 TABLET, DELAYED RELEASE ORAL at 07:23

## 2019-07-21 RX ADMIN — HYDRALAZINE HYDROCHLORIDE 50 MG: 25 TABLET ORAL at 07:22

## 2019-07-21 RX ADMIN — METOPROLOL SUCCINATE 25 MG: 25 TABLET, EXTENDED RELEASE ORAL at 09:21

## 2019-07-21 RX ADMIN — ISOSORBIDE DINITRATE 20 MG: 10 TABLET ORAL at 09:21

## 2019-07-21 RX ADMIN — FUROSEMIDE 40 MG: 40 TABLET ORAL at 09:20

## 2019-07-21 NOTE — ASSESSMENT & PLAN NOTE
Ngoc orbital edema of both eyes  Seen by infectious disease and ophthalmology  Does not appear infectious, initially severe edema but much improved with IV steroids for allergic/contact dermatitis    Has been transitioned to prednisone and will be discharged with taper

## 2019-07-21 NOTE — ASSESSMENT & PLAN NOTE
Paroxysmal atrial fibrillation on metoprolol    Anticoagulated with warfarin    Results from last 7 days   Lab Units 07/21/19  0542 07/19/19  0432 07/17/19  0432   INR  1 12 1 11 1 12

## 2019-07-21 NOTE — PROGRESS NOTES
NEPHROLOGY PROGRESS NOTE   Zeeshan Lucia [de-identified] y o  male MRN: [id number]  Unit/Bed#: E4 -01 Encounter: [id number]  Reason for Consult: SHAHIDA/CKD    ASSESSMENT/PLAN:  1  Acute Kidney Injury, POA- now resolved  - secondary to prerenal azotemia  - UA bland  2  Chronic Kidney disease stage IV- Baseline creatinine is in the mid 2s to low 3s  - needs nephrology follow up in [facility] office on discharge  - creatinine continues to improve  3  Hypertension- BP acceptable  4  Anemia- no iron deficiency / iron studies acceptable  5  BMD- PTH okay at 150, will follow as an outpatient  6  Volume Status- restarted lasix on 7/19  - appears euvolemic  - creatinine continues to improve  7   Periorbital contact dermatitis- per primary team    Disposition:  Stable from a renal standpoint    SUBJECTIVE:  Patient without acute complaints of SOB and urinary changes    OBJECTIVE:  Current Weight: Weight - Scale: 65 kg (143 lb 4 8 oz)  Vitals:    07/20/19 2350 07/21/19 0546 07/21/19 0722 07/21/19 0814   BP: 156/70  (!) 179/103 158/75   BP Location: Right arm   Left arm   Pulse: 61   60   Resp: 18   18   Temp: 97 5 °F (36 4 °C)   (!) 97 °F (36 1 °C)   TempSrc: Temporal   Tympanic   SpO2: 96%   98%   Weight:  65 kg (143 lb 4 8 oz)     Height:           Intake/Output Summary (Last 24 hours) at 7/21/2019 7993  Last data filed at 7/21/2019 5967  Gross per 24 hour   Intake 690 ml   Output 1275 ml   Net -585 ml     General: NAD  Skin: no rash  HEENT: normocephalic  Neck: supple  Chest: CTAB  Heart: RRR  Abdomen: soft nt nd  Extremities: no edema  Neuro: alert awake  Psych: mood and affect appropriate    Medications:    Current Facility-Administered Medications:     acetaminophen (TYLENOL) tablet 650 mg, 650 mg, Oral, Q6H PRN, Vivek Ybarra PA-C, 650 mg at 07/18/19 0915    atorvastatin (LIPITOR) tablet 80 mg, 80 mg, Oral, Daily With Dinner, Vivek Ybarra PA-C, 80 mg at 07/20/19 1710    diphenhydrAMINE (BENADRYL) injection 25 mg, 25 mg, Intravenous, Q6H PRN, Geroldine Pickle, PA-C, 25 mg at 07/19/19 1553    furosemide (LASIX) tablet 40 mg, 40 mg, Oral, BID (diuretic), Moise Mooney DO, 40 mg at 07/20/19 1710    gabapentin (NEURONTIN) capsule 300 mg, 300 mg, Oral, Daily, Geroldine Venicele, PA-C, 300 mg at 07/20/19 0840    hydrALAZINE (APRESOLINE) tablet 50 mg, 50 mg, Oral, Q8H [facility], AUDREY Looney, 50 mg at 07/21/19 5573    hydrocortisone 1 % cream, , Topical, 4x Daily PRN, Leia Verdin MD    isosorbide dinitrate (ISORDIL) tablet 20 mg, 20 mg, Oral, TID after meals, AUDREY Looney, 20 mg at 07/20/19 1710    metoprolol succinate (TOPROL-XL) 24 hr tablet 25 mg, 25 mg, Oral, Daily, AUDREY Looney, 25 mg at 07/20/19 0840    ondansetron (ZOFRAN) injection 4 mg, 4 mg, Intravenous, Q6H PRN, Geroldine Nigel, PA-C    pantoprazole (PROTONIX) EC tablet 40 mg, 40 mg, Oral, Early Morning, Geroldine Pickle, PA-C, 40 mg at 07/21/19 0021    [COMPLETED] predniSONE tablet 40 mg, 40 mg, Oral, Daily, 40 mg at 07/20/19 0840 **FOLLOWED BY** predniSONE tablet 30 mg, 30 mg, Oral, Daily **FOLLOWED BY** [START ON 7/23/2019] predniSONE tablet 20 mg, 20 mg, Oral, Daily **FOLLOWED BY** [START ON 7/25/2019] predniSONE tablet 10 mg, 10 mg, Oral, Daily, Moise Mooney DO    tamsulosin (FLOMAX) capsule 0 8 mg, 0 8 mg, Oral, Daily With Dinner, Geroldine Nigel, PA-C, 0 8 mg at 07/20/19 1710    warfarin (COUMADIN) tablet 5 mg, 5 mg, Oral, Daily (warfarin), Manpreet Quach DO, 5 mg at 07/20/19 1710    Laboratory Results:  Results from last 7 days   Lab Units 07/21/19  0542 07/20/19  1118 07/19/19  0432 07/18/19  0456 07/17/19  0432 07/15/19  2140   WBC Thousand/uL  --  8 32  --   --  4 19* 5 88   HEMOGLOBIN g/dL  --  9 3*  --   --  9 8* 11 1*   HEMATOCRIT %  --  29 4*  --   --  31 1* 35 6*   PLATELETS Thousands/uL  --  195  --   --  182 205   POTASSIUM mmol/L 3 8 3 7 4 6 4 8 4 3 5 1   CHLORIDE mmol/L 104 104 104 104 107 102   CO2 mmol/L 25 22 22 21 22 22   BUN mg/dL 76* 78* 81* 76* 63* 67*   CREATININE mg/dL 2 85* 3 13* 3 33* 3 39* 3 33* 3 72*   CALCIUM mg/dL 9 9 9 6 9 8 9 7 9 6 10 4*   PHOSPHORUS mg/dL  --   --   --   --  4 5*  --

## 2019-07-21 NOTE — ASSESSMENT & PLAN NOTE
Wt Readings from Last 3 Encounters:   07/21/19 65 kg (143 lb 4 8 oz)   07/05/19 63 kg (139 lb)     Chronic systolic CHF last known LVEF 29%  Initially holding diuretics given kidney injury  Furosemide was restarted during latter part of hospitalization    Can resume 40 mg b i d  as previously taken

## 2019-07-21 NOTE — ASSESSMENT & PLAN NOTE
Hyperkalemia on admission but can be restarted now that patient is back on furosemide    Results from last 7 days   Lab Units 07/21/19  0542 07/20/19  1118 07/19/19  0432 07/18/19  0456 07/17/19  0432 07/15/19  2140   POTASSIUM mmol/L 3 8 3 7 4 6 4 8 4 3 5 1

## 2019-07-21 NOTE — ASSESSMENT & PLAN NOTE
SHAHIDA on CKD 4; improved with holding diuretics and IVF per nephrology initially    Diuretics have been restarted and renal function remained stable/continues to improve    Results from last 7 days   Lab Units 07/21/19  0542 07/20/19  1118 07/19/19  0432 07/18/19  0456 07/17/19  0432 07/15/19  2140   BUN mg/dL 76* 78* 81* 76* 63* 67*   CREATININE mg/dL 2 85* 3 13* 3 33* 3 39* 3 33* 3 72*   EGFR ml/min/1 73sq m 20 18 17 16 17 14

## 2019-07-21 NOTE — DISCHARGE SUMMARY
Discharge- Kristopher Parra 1940, [de-identified] y o  male MRN: [id number]  Unit/Bed#: E4 -01 Encounter: [id number]  Primary Care Provider: Naheed Fletcher PA-C   Date and time admitted to hospital: 7/15/2019  8:30 PM        Admitting Provider:  Carter Michaud DO  Discharge Provider:  Sivan Chan DO  Admission Date: 7/15/2019       Discharge Date: 07/21/19   LOS: 6  Primary Care Physician at Discharge: Celina Arnett COURSE:  Kristopher Parra is a [de-identified] y o  male who presented to the hospital worsening periorbital edema  This has been an ongoing problem and initially there was question of neglect at home  The patient called police to bring him to the emergency department  He receives most/all of his care at [facility]  Upon arrival he was noted to have significant periorbital edema and imaging was concerning for periorbital cellulitis  He was subsequently admitted  He was seen by Infectious Disease and Ophthalmology  Recommendation was to discontinue all systemic and topical antibiotics and he was started on IV methylprednisolone as swelling was likely contact dermatitis  His edema markedly improved  During latter part of hospitalization it was recommended he go to SNF but he continues to refuse  He wishes to return home with his daughter whom he initially called the police on  He will be discharged with VNA/home PT at his request instead of SNF understanding risk of further physical decompensation  DISCHARGE DIAGNOSES  * Periorbital edema  Assessment & Plan  Ngoc orbital edema of both eyes  Seen by infectious disease and ophthalmology  Does not appear infectious, initially severe edema but much improved with IV steroids for allergic/contact dermatitis  Has been transitioned to prednisone and will be discharged with taper    Age-related cognitive decline  Assessment & Plan  Cognitive decline    Discussed with neurology who recommends outpatient neurocognitive testing    Hyperkalemia  Assessment & Plan  Hyperkalemia on admission but can be restarted now that patient is back on furosemide    Results from last 7 days   Lab Units 07/21/19  0542 07/20/19  1118 07/19/19  0432 07/18/19  0456 07/17/19  0432 07/15/19  2140   POTASSIUM mmol/L 3 8 3 7 4 6 4 8 4 3 5 1       PAD (peripheral artery disease) ([facility])  Assessment & Plan  PA D status post right lower extremity stenting  Continue atorvastatin and anticoagulation  BPH (benign prostatic hyperplasia)  Assessment & Plan  BPH continue tamsulosin    Acute renal failure superimposed on stage 4 chronic kidney disease (HCC)  Assessment & Plan  SHAHIDA on CKD 4; improved with holding diuretics and IVF per nephrology initially  Diuretics have been restarted and renal function remained stable/continues to improve    Results from last 7 days   Lab Units 07/21/19  0542 07/20/19  1118 07/19/19  0432 07/18/19  0456 07/17/19  0432 07/15/19  2140   BUN mg/dL 76* 78* 81* 76* 63* 67*   CREATININE mg/dL 2 85* 3 13* 3 33* 3 39* 3 33* 3 72*   EGFR ml/min/1 73sq m 20 18 17 16 17 14       HTN (hypertension)  Assessment & Plan  Essential hypertension on metoprolol and hydralazine    Paroxysmal atrial fibrillation ([facility])  Assessment & Plan  Paroxysmal atrial fibrillation on metoprolol  Anticoagulated with warfarin    Results from last 7 days   Lab Units 07/21/19  0542 07/19/19  0432 07/17/19  0432   INR  1 12 1 11 1 12       GERD (gastroesophageal reflux disease)  Assessment & Plan  GERD continue pantoprazole    Chronic systolic congestive heart failure [facility])  Assessment & Plan  Wt Readings from Last 3 Encounters:   07/21/19 65 kg (143 lb 4 8 oz)   07/05/19 63 kg (139 lb)     Chronic systolic CHF last known LVEF 29%  Initially holding diuretics given kidney injury  Furosemide was restarted during latter part of hospitalization    Can resume 40 mg b i d  as previously taken    [facility] MANAGEMENT  IP CONSULT TO PHARMACY  IP CONSULT TO INFECTIOUS DISEASES  IP CONSULT TO NEPHROLOGY  IP CONSULT TO OPHTHALMOLOGY      RADIOLOGY RESULTS  Ct Orbits/temporal Bones/skull Base Wo Contrast  Result Date: 7/15/2019  Impression: Findings compatible with bilateral periorbital cellulitis  No retro-orbital inflammation or abscess   Workstation performed: [id number]       LABS  Results from last 7 days   Lab Units 07/21/19  0542 07/20/19  1118 07/19/19  0432 07/17/19  0432 07/15/19  2140   WBC Thousand/uL  --  8 32  --  4 19* 5 88   HEMOGLOBIN g/dL  --  9 3*  --  9 8* 11 1*   HEMATOCRIT %  --  29 4*  --  31 1* 35 6*   MCV fL  --  101*  --  103* 104*   PLATELETS Thousands/uL  --  195  --  182 205   INR  1 12  --  1 11 1 12  --      Results from last 7 days   Lab Units 07/21/19  0542 07/20/19  1118 07/19/19  0432 07/18/19  0456 07/17/19  0432 07/15/19  2140   SODIUM mmol/L 139 138 138 138 140 136   POTASSIUM mmol/L 3 8 3 7 4 6 4 8 4 3 5 1   CHLORIDE mmol/L 104 104 104 104 107 102   CO2 mmol/L 25 22 22 21 22 22   BUN mg/dL 76* 78* 81* 76* 63* 67*   CREATININE mg/dL 2 85* 3 13* 3 33* 3 39* 3 33* 3 72*   CALCIUM mg/dL 9 9 9 6 9 8 9 7 9 6 10 4*   ALBUMIN g/dL  --   --   --  3 5  --  3 9   TOTAL BILIRUBIN mg/dL  --   --   --  0 42  --  0 62   ALK PHOS U/L  --   --   --  77  --  84   ALT U/L  --   --   --  11*  --  13   AST U/L  --   --   --  19  --  49*   EGFR ml/min/1 73sq m 20 18 17 16 17 14   GLUCOSE RANDOM mg/dL 99 114 134 128 107 93         Results from last 7 days   Lab Units 07/20/19  1118   PROCALCITONIN ng/ml <0 05       Results from last 7 days   Lab Units 07/19/19  1337   COLOR UA  Light Yellow   CLARITY UA  Clear   SPEC GRAV UA  1 010   PH UA  5 5   LEUKOCYTES UA  Negative   NITRITE UA  Negative   GLUCOSE UA mg/dl Negative   KETONES UA mg/dl Negative   BILIRUBIN UA  Negative   BLOOD UA  Negative      Results from last 7 days   Lab Units 07/19/19  1337   RBC UA /hpf None Seen   WBC UA /hpf None Seen   EPITHELIAL CELLS WET PREP /hpf Occasional   BACTERIA UA /hpf Occasional        PHYSICAL EXAM:  Vitals:   Blood Pressure: 158/75 (07/21/19 0814)  Pulse: 60 (07/21/19 0814)  Temperature: (!) 97 °F (36 1 °C) (07/21/19 0814)  Temp Source: Tympanic (07/21/19 0814)  Respirations: 18 (07/21/19 0814)  Height: 5' 7" (170 2 cm) (07/16/19 0050)  Weight - Scale: 65 kg (143 lb 4 8 oz) (07/21/19 0546)  SpO2: 98 % (07/21/19 0814)    General appearance: alert, appears stated age and cooperative  Skin: Skin color, texture, turgor normal  No rashes or lesions  Head: atraumatic, near resolved periorbital edema compared to admission  Eyes: conjunctivae/corneas clear  PERRL, EOM's intact  Lungs: diminished breath sounds  Heart: regular rate and rhythm  Abdomen: soft, non-tender; bowel sounds normal; no masses,  no organomegaly  Back: symmetric, no curvature  ROM normal  No CVA tenderness  Extremities: edema +1 lower extremities bilaterally  Neurologic: Grossly normal    Planned Re-admission:  No  Discharge Disposition:  Home with VNA    Test Results Pending at Discharge:  None  Incidental findings:  None    Medications   · Summary of Medication Adjustments made as a result of this hospitalization:   · Ngoc orbital edema  Likely allergic  Prednisone taper, hydrocortisone cream sparingly up to 5 days  · Medication Dosing Tapers - Please refer to Discharge Medication List for details on any medication dosing tapers (if applicable to patient)  · Discharge Medication List: See after visit summary for reconciled discharge medications  Diet restrictions: Low sodium diet    Activity restrictions: No strenuous activity  Discharge Condition: stable    Outpatient Follow-Up and Discharge Instructions  See after visit summary section titled Discharge Instructions for information provided to patient and family  Code Status: Level 1 - Full Code  Discharge Statement   I spent 35 minutes discharging the patient   This time was spent on the day of discharge  Greater than 50% of total time was spent with the patient and / or family counseling and / or coordination of care  ** Please Note: This note has been constructed using a voice recognition system   **

## 2019-07-22 ENCOUNTER — EPISODE CHANGES (OUTPATIENT)
Dept: CASE MANAGEMENT | Facility: HOSPITAL | Age: 79
End: 2019-07-22

## 2019-07-23 ENCOUNTER — PATIENT OUTREACH (OUTPATIENT)
Dept: CASE MANAGEMENT | Facility: OTHER | Age: 79
End: 2019-07-23

## 2019-07-23 DIAGNOSIS — N18.9 CKD (CHRONIC KIDNEY DISEASE): Primary | ICD-10-CM

## 2019-07-25 ENCOUNTER — TELEPHONE (OUTPATIENT)
Dept: NEPHROLOGY | Facility: CLINIC | Age: 79
End: 2019-07-25

## 2019-07-25 DIAGNOSIS — N18.4 CKD (CHRONIC KIDNEY DISEASE), STAGE IV (HCC): ICD-10-CM

## 2019-07-25 DIAGNOSIS — I10 ESSENTIAL HYPERTENSION: Primary | ICD-10-CM

## 2019-07-25 NOTE — TELEPHONE ENCOUNTER
I spoke to the patient's daughter and she made the patient an appointment for 8/20 with Jas Vazquez  She is aware she needs labs done before the appointment  I mailed the patient a lab slip and also an appointment card

## 2019-07-25 NOTE — TELEPHONE ENCOUNTER
----- Message from Harrison Arzola DO sent at 7/19/2019 11:12 AM EDT -----  Please schedule the following patient with follow-up for stage 4 chronic kidney disease with a repeat BMP next week and follow-up with the next 2-4 weeks

## 2019-08-14 ENCOUNTER — TELEPHONE (OUTPATIENT)
Dept: NEPHROLOGY | Facility: CLINIC | Age: 79
End: 2019-08-14

## 2019-08-14 NOTE — TELEPHONE ENCOUNTER
Returned phone call to Bipin from [facility]  Okay to hold potassium since patient does not want to take it  Last potassium 3 8  on 7/21/2019  Pt for BMP prior to appt    Will trend for now

## 2019-08-14 NOTE — TELEPHONE ENCOUNTER
Tierra Varner from [facility] called and wanted to let you know that she wasn't able to get blood work done today like she had planned but she will be back at his house on Monday, Aug 19th to get the blood work completed  He is seeing Evelyn Mahoney on Tues Aug 20th She also said that She also wanted me to make you aware that he has not been taking his potassium medication  She said it has been "causing jumping in his body"  She checked his med box and she said it has to be at least two weeks since he took the potassium medication  Her call back number is [phone]

## 2019-08-15 NOTE — TELEPHONE ENCOUNTER
I spoke with [facility] to get bloodwork drawn 8/19, with office visit with Jazmin Hua 8/20  The patient is refusing potassium at this time but takes lasix  May need encouragement to take potassium supplements based on bloodwork results to be drawn 8/19

## 2019-08-16 ENCOUNTER — TELEPHONE (OUTPATIENT)
Dept: NEPHROLOGY | Facility: CLINIC | Age: 79
End: 2019-08-16

## 2019-08-16 NOTE — TELEPHONE ENCOUNTER
I spoke to the patient's daughter and reminded her of the patient's appointment on 8/20 at 2:30pm  She is aware and also aware here is a BMP to be done before the appointment

## 2019-08-19 ENCOUNTER — LAB REQUISITION (OUTPATIENT)
Dept: LAB | Facility: HOSPITAL | Age: 79
End: 2019-08-19
Payer: MEDICARE

## 2019-08-19 DIAGNOSIS — N18.4 CHRONIC KIDNEY DISEASE, STAGE IV (SEVERE) (HCC): ICD-10-CM

## 2019-08-19 LAB
ANION GAP SERPL CALCULATED.3IONS-SCNC: 10 MMOL/L (ref 4–13)
BUN SERPL-MCNC: 39 MG/DL (ref 5–25)
CALCIUM SERPL-MCNC: 8.8 MG/DL (ref 8.3–10.1)
CHLORIDE SERPL-SCNC: 108 MMOL/L (ref 100–108)
CO2 SERPL-SCNC: 22 MMOL/L (ref 21–32)
CREAT SERPL-MCNC: 3.08 MG/DL (ref 0.6–1.3)
GFR SERPL CREATININE-BSD FRML MDRD: 18 ML/MIN/1.73SQ M
GLUCOSE SERPL-MCNC: 84 MG/DL (ref 65–140)
POTASSIUM SERPL-SCNC: 3.7 MMOL/L (ref 3.5–5.3)
SODIUM SERPL-SCNC: 140 MMOL/L (ref 136–145)

## 2019-08-19 PROCEDURE — 80048 BASIC METABOLIC PNL TOTAL CA: CPT | Performed by: NURSE PRACTITIONER

## 2019-08-19 NOTE — PROGRESS NOTES
OFFICE FOLLOW UP - Nephrology   Hussain Salma [de-identified] y o  male MRN: [id number]       ASSESSMENT and PLAN:  Guille Davenport was seen today for follow-up, chronic kidney disease and hypertension  Diagnoses and all orders for this visit:    Acute renal failure superimposed on stage 4 chronic kidney disease, unspecified acute renal failure type ([place] [address] )  -resolved prior to discharge  -previous UA was essentially bland  -suspect secondary to volume depletion with diuresis and contact dermatitis requiring antibiotics    CKD (chronic kidney disease), stage IV ([place] [address] )  -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, +/-cardiorenal syndrome,  age-related nephron loss in the setting of decreased EF of 20%  -previously followed a nephrologist in [place], [place]  -per patient report had short-term hemodialysis in the past  -past medical records reviewed and reveals baseline creatinine 2 7-3 2 with eGFR 16-18      -most recent BMP obtained on 08/19/2019 reveals stable creatinine of 3 08 with EGFR of 18-at baseline  -with patient return in three months at the [facility] office with updated blood work and urine studies for ongoing Chronic Kidney Disease management  -consider referral to Kidney Smart classes at next appointment  -     Basic metabolic panel; Future  -     CBC and differential; Future  -     Magnesium; Future  -     Phosphorus; Future  -     PTH, intact; Future  -     Protein / creatinine ratio, urine; Future    Essential hypertension  -BP acceptable  -currently on Lasix 40 mg 2 times daily, hydralazine 50 mg 3 times daily, Imdur 20 mg 3 times daily, Toprol XL 25 mg daily  -avoid hypotension    Chronic systolic congestive heart failure ([place] [address] )  -known ischemic cardiomyopathy with EF 20%  -follows with transplantation at [facility]  -encourage fluid restrictions of 1 5 L per day    Electrolytes:   -most recent potassium 3 7  -encourage use of potassium with b i d   Dosing of diuretics  -instructed patient and daughters to dissolve potassium in a small amount of water then add to ice cream/out post sauce or yogurt to assist with taking the large pill-they agree  -discussed  potassium depletion with diuretic therapy and effects on the heart    Anemia of Chronic Kidney Disease:  -last hemoglobin on discharge was 9 3 and stable  -continue trend for now  -iron stores on 07/18/2019 within normal limits; iron 69, iron saturation 35, TIBC 198, folate 9 7    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:  -PTH on 07/17/2019 was 150 7  -will check PTH and phosphorus with his next office visit  -will add vitamin D for further evaluation    Patient Instructions   All questions asked and answered  The patient has been instructed to call office at [phone] with any questions or concerns  The patient has been instructed to obtain prescribed blood work and urine studies prior to next appointment  Avoid NSAID products to include Motrin, Ibuprofen, Aleve, Naprosyn, or naproxen   Put potassium tablet in water to dissolve and place in ice cream daily  Return in 3 months for kidney follow up in [place]        HPI: Hussain Marsh is a [de-identified] y o  male who is here for Follow-up (SHAHIDA); Chronic Kidney Disease; and Hypertension  The patient returns to office for hospital follow up for SHAHIDA with known CKD IV  He initially presented with periorbital contact dermatitis and found to have SHAHIDA with creatinine 3 7 and thought to be secondary to over diuresis  He was treated with IVF, holding diuretics and ultimately D/C on 7/21/2019 with creatinine at 2 85  Since discharge, VNA called to report patient not taking potassium tablets  Medical records reviewed and reveals baseline creatinine 2 7-3 2 with eGFR 16-18  Medication list reviewed  On discussion he is complaining of generalized aches and pains  He continues with left shoulder pain that has been present since pacemaker been inserted five years ago per daughter's report    Denies chest pain, shortness of breath, dizziness, lightheadedness, nausea, vomiting, urinary issues, fevers, chills, lower extremity edema swelling  Patient complains of difficulty taking his potassium pill cause it is too large and stated he really did not want to take it  Most recent lab work reviewed with patient and family in room  ROS:   All the systems were reviewed and were negative except as documented on the HPI  Allergies: Patient has no known allergies      Medications:   Current Outpatient Medications:     atorvastatin (LIPITOR) 80 mg tablet, Take 80 mg by mouth daily, Disp: , Rfl:     diphenhydrAMINE (BENADRYL) 50 MG tablet, Take 25 mg by mouth every 6 (six) hours as needed for itching , Disp: , Rfl:     furosemide (LASIX) 40 mg tablet, Take 40 mg by mouth 2 (two) times a day, Disp: , Rfl:     gabapentin (NEURONTIN) 300 mg capsule, Take 300 mg by mouth daily, Disp: , Rfl:     hydrALAZINE (APRESOLINE) 25 mg tablet, Take 50 mg by mouth Three times a day, Disp: , Rfl:     hydrocortisone 1 % cream, Apply topically 4 (four) times a day as needed for irritation for up to 5 days (DO NOT USE FOR MORE THAN 5 DAYS), Disp: 30 g, Rfl: 0    isosorbide dinitrate (ISORDIL) 20 mg tablet, Take 20 mg by mouth Three times a day, Disp: , Rfl:     metoprolol succinate (TOPROL-XL) 25 mg 24 hr tablet, Take 25 mg by mouth daily, Disp: , Rfl:     pantoprazole (PROTONIX) 40 mg tablet, Take 40 mg by mouth daily, Disp: , Rfl:     potassium chloride (K-DUR,KLOR-CON) 10 mEq tablet, Take 10 mEq by mouth daily, Disp: , Rfl:     predniSONE 10 mg tablet, Days 1-2: 3 tablets daily; Days 3-4: 2 tablets daily; Days 5-6: 1 tablets daily then stop, Disp: 12 tablet, Rfl: 0    tamsulosin (FLOMAX) 0 4 mg, Take 0 8 mg by mouth, Disp: , Rfl:     warfarin (COUMADIN) 3 mg tablet, Take 3 mg by mouth daily, Disp: , Rfl:     Past Medical History:   Diagnosis Date    A[facility]fib ([facility] )     CHF (congestive heart failure) (HCC)     CKD (chronic kidney disease) stage 4, GFR 15-29 ml/min ([facility])     GERD (gastroesophageal reflux disease)     Glaucoma     Hypertension     Renal disorder      Past Surgical History:   Procedure Laterality Date    ABDOMINAL SURGERY      CARDIAC PACEMAKER PLACEMENT       Family History   Problem Relation Age of Onset    No Known Problems Mother     No Known Problems Father       reports that he quit smoking about 39 years ago  He has a 60 00 pack-year smoking history  He has never used smokeless tobacco  He reports that he does not drink alcohol or use drugs  Physical Exam:   Vitals:    08/20/19 1449 08/20/19 1509   BP:  122/62   BP Location:  Right arm   Patient Position:  Sitting   Cuff Size:  Standard   Weight: 73 2 kg (161 lb 6 4 oz)    Height: 5' 7" (1 702 m)      Body mass index is 25 28 kg/m²      General: in not acute distress, sitting in wheelchair, thin  Eyes: conjunctivae pink, anicteric sclerae  ENT: lips and mucous membranes moist  Neck: supple, no JVD noted  Chest: clear breath sounds bilateral, no crackles, ronchus or wheezings  CVS: normal rate, regular rhythm  Abdomen: soft, non-tender, non-distended, normoactive bowel sounds  Back: no CVA tenderness  Extremities: no edema of both legs  Skin: no rash, warm and dry  Neuro: awake, alert, oriented      Lab Results:  Results for orders placed or performed in visit on 33/54/05   Basic metabolic panel   Result Value Ref Range    Sodium 140 136 - 145 mmol/L    Potassium 3 7 3 5 - 5 3 mmol/L    Chloride 108 100 - 108 mmol/L    CO2 22 21 - 32 mmol/L    ANION GAP 10 4 - 13 mmol/L    BUN 39 (H) 5 - 25 mg/dL    Creatinine 3 08 (H) 0 60 - 1 30 mg/dL    Glucose 84 65 - 140 mg/dL    Calcium 8 8 8 3 - 10 1 mg/dL    eGFR 18 ml/min/1 73sq m       Results from last 7 days   Lab Units 08/19/19  0910   SODIUM mmol/L 140   POTASSIUM mmol/L 3 7   CHLORIDE mmol/L 108   CO2 mmol/L 22   BUN mg/dL 39*   CREATININE mg/dL 3 08*   CALCIUM mg/dL 8 8           Portions of the record may have been created with voice recognition software  Occasional wrong word or "sound a like" substitutions may have occurred due to the inherent limitations of voice recognition software  Read the chart carefully and recognize, using context, where substitutions have occurred  If you have any questions, please contact the dictating provider

## 2019-08-19 NOTE — PATIENT INSTRUCTIONS
All questions asked and answered  The patient has been instructed to call office at [phone] with any questions or concerns    The patient has been instructed to obtain prescribed blood work and urine studies prior to next appointment  Avoid NSAID products to include Motrin, Ibuprofen, Aleve, Naprosyn, or naproxen   Put potassium tablet in water to dissolve and place in ice cream daily  Return in 3 months for kidney follow up in [place]

## 2019-08-20 ENCOUNTER — OFFICE VISIT (OUTPATIENT)
Dept: NEPHROLOGY | Facility: CLINIC | Age: 79
End: 2019-08-20
Payer: MEDICARE

## 2019-08-20 VITALS
SYSTOLIC BLOOD PRESSURE: 122 MMHG | WEIGHT: 161.4 LBS | BODY MASS INDEX: 25.33 KG/M2 | HEIGHT: 67 IN | DIASTOLIC BLOOD PRESSURE: 62 MMHG

## 2019-08-20 DIAGNOSIS — N18.4 ACUTE RENAL FAILURE SUPERIMPOSED ON STAGE 4 CHRONIC KIDNEY DISEASE, UNSPECIFIED ACUTE RENAL FAILURE TYPE (HCC): Primary | ICD-10-CM

## 2019-08-20 DIAGNOSIS — N18.4 CKD (CHRONIC KIDNEY DISEASE), STAGE IV (HCC): ICD-10-CM

## 2019-08-20 DIAGNOSIS — I50.22 CHRONIC SYSTOLIC CONGESTIVE HEART FAILURE (HCC): ICD-10-CM

## 2019-08-20 DIAGNOSIS — N17.9 ACUTE RENAL FAILURE SUPERIMPOSED ON STAGE 4 CHRONIC KIDNEY DISEASE, UNSPECIFIED ACUTE RENAL FAILURE TYPE (HCC): Primary | ICD-10-CM

## 2019-08-20 DIAGNOSIS — N18.9 CHRONIC KIDNEY DISEASE-MINERAL AND BONE DISORDER: ICD-10-CM

## 2019-08-20 DIAGNOSIS — E83.9 CHRONIC KIDNEY DISEASE-MINERAL AND BONE DISORDER: ICD-10-CM

## 2019-08-20 DIAGNOSIS — M89.9 CHRONIC KIDNEY DISEASE-MINERAL AND BONE DISORDER: ICD-10-CM

## 2019-08-20 DIAGNOSIS — N18.4 ANEMIA DUE TO STAGE 4 CHRONIC KIDNEY DISEASE (HCC): ICD-10-CM

## 2019-08-20 DIAGNOSIS — D63.1 ANEMIA DUE TO STAGE 4 CHRONIC KIDNEY DISEASE (HCC): ICD-10-CM

## 2019-08-20 DIAGNOSIS — I10 ESSENTIAL HYPERTENSION: ICD-10-CM

## 2019-08-20 PROCEDURE — 99214 OFFICE O/P EST MOD 30 MIN: CPT | Performed by: NURSE PRACTITIONER

## 2019-08-20 PROCEDURE — 1124F ACP DISCUSS-NO DSCNMKR DOCD: CPT | Performed by: NURSE PRACTITIONER

## 2019-09-23 ENCOUNTER — OFFICE VISIT (OUTPATIENT)
Dept: NEUROLOGY | Facility: CLINIC | Age: 79
End: 2019-09-23
Payer: MEDICARE

## 2019-09-23 VITALS
SYSTOLIC BLOOD PRESSURE: 97 MMHG | WEIGHT: 170 LBS | DIASTOLIC BLOOD PRESSURE: 52 MMHG | HEIGHT: 67 IN | HEART RATE: 77 BPM | BODY MASS INDEX: 26.68 KG/M2

## 2019-09-23 DIAGNOSIS — I48.20 CHRONIC ATRIAL FIBRILLATION (HCC): ICD-10-CM

## 2019-09-23 DIAGNOSIS — F03.91 DEMENTIA WITH PSYCHOSIS (HCC): Primary | ICD-10-CM

## 2019-09-23 PROCEDURE — 99204 OFFICE O/P NEW MOD 45 MIN: CPT | Performed by: PSYCHIATRY & NEUROLOGY

## 2019-09-23 NOTE — PATIENT INSTRUCTIONS
· Reviewed blood test results from last three months as chronic renal failure on BMP, anemia on CBC  Last PT/INR taken 7/21/19  · Obtain lab testing as above  · No recent or relevant neuroimaging results to review  · Will try to obtain MRI Brain NeuroQuant for dementia concern, he has pacemaker placed in 2013  The implant was performed in an [address]     · He will require a Psychiatric evaluation first for possible paranoid schizophrenia  · Encouraged to remain both physically and mentally active, including crossword puzzles, brain teasers  Online resources at [employer] for cognitive training games for free  · Encouraged enrollment in day program for social interaction and stimulation  · Will attempt to normalize sleep with sleep hygiene techniques  Discussed about sleep hygiene methods, including sleeping in the same location at the same time every night, removal of all light and sound distractions, avoiding caffeine and exercise  Can use melatonin, chamomile tea, and/or valerian root extract for sleep  · His diet may be poor related to paranoia about his food  Recommended adding Ensure to diet  · Thank you very much for sending me this interesting patient  · The patient has been instructed to call us about any new neurological problems or medication side effects    · Return to Clinic in 5 months
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
## 2019-09-23 NOTE — PROGRESS NOTES
Review of Systems   Constitutional: Negative  Negative for appetite change and fever  HENT: Negative  Negative for hearing loss, tinnitus, trouble swallowing and voice change  Eyes: Negative  Negative for photophobia and pain  Respiratory: Negative  Negative for shortness of breath  Cardiovascular: Negative  Negative for palpitations  Gastrointestinal: Negative  Negative for nausea and vomiting  Endocrine: Negative  Negative for cold intolerance and heat intolerance  Genitourinary: Negative  Negative for dysuria, frequency and urgency  Musculoskeletal: Negative  Negative for myalgias and neck pain  Skin: Negative  Negative for rash  Neurological: Negative for dizziness, tremors, seizures, syncope, facial asymmetry, speech difficulty, weakness, light-headedness, numbness and headaches  Positive for forgetfulness     Hematological: Negative  Does not bruise/bleed easily  Psychiatric/Behavioral: Negative  Negative for confusion, hallucinations and sleep disturbance

## 2019-10-02 ENCOUNTER — LAB (OUTPATIENT)
Dept: LAB | Age: 79
End: 2019-10-02
Payer: MEDICARE

## 2019-10-02 DIAGNOSIS — F03.91 DEMENTIA WITH PSYCHOSIS (HCC): ICD-10-CM

## 2019-10-02 DIAGNOSIS — I48.20 CHRONIC ATRIAL FIBRILLATION (HCC): ICD-10-CM

## 2019-10-02 LAB
MAGNESIUM SERPL-MCNC: 2.5 MG/DL (ref 1.6–2.6)
TSH SERPL DL<=0.05 MIU/L-ACNC: 2.15 UIU/ML (ref 0.36–3.74)
VIT B12 SERPL-MCNC: 425 PG/ML (ref 100–900)

## 2019-10-02 PROCEDURE — 84443 ASSAY THYROID STIM HORMONE: CPT

## 2019-10-02 PROCEDURE — 82607 VITAMIN B-12: CPT

## 2019-10-02 PROCEDURE — 86592 SYPHILIS TEST NON-TREP QUAL: CPT

## 2019-10-02 PROCEDURE — 82525 ASSAY OF COPPER: CPT

## 2019-10-02 PROCEDURE — 82390 ASSAY OF CERULOPLASMIN: CPT

## 2019-10-02 PROCEDURE — 36415 COLL VENOUS BLD VENIPUNCTURE: CPT

## 2019-10-02 PROCEDURE — 83735 ASSAY OF MAGNESIUM: CPT

## 2019-10-03 LAB
CERULOPLASMIN SERPL-MCNC: 31.3 MG/DL (ref 16–31)
RPR SER QL: NORMAL

## 2019-10-04 LAB — COPPER SERPL-MCNC: 135 UG/DL (ref 72–166)

## 2019-10-04 NOTE — RESULT ENCOUNTER NOTE
Pt does not have MyChart  Please inform him or better yet his daughter (pt has dementia) that his lab tests came back all unremarkable  His ceruloplasmin was only slightly higher than normal range but nonspecific for anything  After reviewing the records from the [facility] he got the pacemaker from, it did not give any information on the make or model of the device  Hence, we will have to cancel the previous MRI plan and I will order a CT Head w/wo contrast instead  Order in  Otherwise please proceed with rest of the same plan, including Psych eval  Thank you

## 2019-10-08 ENCOUNTER — TELEPHONE (OUTPATIENT)
Dept: NEUROLOGY | Facility: CLINIC | Age: 79
End: 2019-10-08

## 2019-10-08 NOTE — TELEPHONE ENCOUNTER
I called and left Sharon Kwan a message to call me back regarding her father's lab results and tests requested

## 2019-10-08 NOTE — TELEPHONE ENCOUNTER
----- Message from Nagi Arellano MD sent at 10/4/2019  7:40 PM EDT -----  Pt does not have MyChart  Please inform him or better yet his daughter (pt has dementia) that his lab tests came back all unremarkable  His ceruloplasmin was only slightly higher than normal range but nonspecific for anything  After reviewing the records from the [facility] he got the pacemaker from, it did not give any information on the make or model of the device  Hence, we will have to cancel the previous MRI plan and I will order a CT Head w/wo contrast instead  Order in  Otherwise please proceed with rest of the same plan, including Psych eval  Thank you

## 2019-12-03 ENCOUNTER — APPOINTMENT (OUTPATIENT)
Dept: LAB | Age: 79
End: 2019-12-03
Payer: MEDICARE

## 2019-12-03 DIAGNOSIS — E83.9 CHRONIC KIDNEY DISEASE-MINERAL AND BONE DISORDER: ICD-10-CM

## 2019-12-03 DIAGNOSIS — N18.9 CHRONIC KIDNEY DISEASE-MINERAL AND BONE DISORDER: ICD-10-CM

## 2019-12-03 DIAGNOSIS — M89.9 CHRONIC KIDNEY DISEASE-MINERAL AND BONE DISORDER: ICD-10-CM

## 2019-12-03 DIAGNOSIS — N18.4 CKD (CHRONIC KIDNEY DISEASE), STAGE IV (HCC): ICD-10-CM

## 2019-12-03 LAB
25(OH)D3 SERPL-MCNC: 17.4 NG/ML (ref 30–100)
ANION GAP SERPL CALCULATED.3IONS-SCNC: 7 MMOL/L (ref 4–13)
BASOPHILS # BLD AUTO: 0.03 THOUSANDS/ΜL (ref 0–0.1)
BASOPHILS NFR BLD AUTO: 1 % (ref 0–1)
BUN SERPL-MCNC: 49 MG/DL (ref 5–25)
CALCIUM SERPL-MCNC: 9.4 MG/DL (ref 8.3–10.1)
CHLORIDE SERPL-SCNC: 112 MMOL/L (ref 100–108)
CO2 SERPL-SCNC: 25 MMOL/L (ref 21–32)
CREAT SERPL-MCNC: 3.71 MG/DL (ref 0.6–1.3)
CREAT UR-MCNC: 125 MG/DL
EOSINOPHIL # BLD AUTO: 0.42 THOUSAND/ΜL (ref 0–0.61)
EOSINOPHIL NFR BLD AUTO: 11 % (ref 0–6)
ERYTHROCYTE [DISTWIDTH] IN BLOOD BY AUTOMATED COUNT: 13.2 % (ref 11.6–15.1)
GFR SERPL CREATININE-BSD FRML MDRD: 15 ML/MIN/1.73SQ M
GLUCOSE P FAST SERPL-MCNC: 94 MG/DL (ref 65–99)
HCT VFR BLD AUTO: 33.5 % (ref 36.5–49.3)
HGB BLD-MCNC: 10.1 G/DL (ref 12–17)
IMM GRANULOCYTES # BLD AUTO: 0.01 THOUSAND/UL (ref 0–0.2)
IMM GRANULOCYTES NFR BLD AUTO: 0 % (ref 0–2)
LYMPHOCYTES # BLD AUTO: 1.74 THOUSANDS/ΜL (ref 0.6–4.47)
LYMPHOCYTES NFR BLD AUTO: 47 % (ref 14–44)
MAGNESIUM SERPL-MCNC: 2.3 MG/DL (ref 1.6–2.6)
MCH RBC QN AUTO: 31.5 PG (ref 26.8–34.3)
MCHC RBC AUTO-ENTMCNC: 30.1 G/DL (ref 31.4–37.4)
MCV RBC AUTO: 104 FL (ref 82–98)
MONOCYTES # BLD AUTO: 0.44 THOUSAND/ΜL (ref 0.17–1.22)
MONOCYTES NFR BLD AUTO: 12 % (ref 4–12)
NEUTROPHILS # BLD AUTO: 1.09 THOUSANDS/ΜL (ref 1.85–7.62)
NEUTS SEG NFR BLD AUTO: 29 % (ref 43–75)
NRBC BLD AUTO-RTO: 0 /100 WBCS
PHOSPHATE SERPL-MCNC: 3.4 MG/DL (ref 2.3–4.1)
PLATELET # BLD AUTO: 181 THOUSANDS/UL (ref 149–390)
PMV BLD AUTO: 10.3 FL (ref 8.9–12.7)
POTASSIUM SERPL-SCNC: 4 MMOL/L (ref 3.5–5.3)
PROT UR-MCNC: 13 MG/DL
PROT/CREAT UR: 0.1 MG/G{CREAT} (ref 0–0.1)
PTH-INTACT SERPL-MCNC: 216.4 PG/ML (ref 18.4–80.1)
RBC # BLD AUTO: 3.21 MILLION/UL (ref 3.88–5.62)
SODIUM SERPL-SCNC: 144 MMOL/L (ref 136–145)
WBC # BLD AUTO: 3.73 THOUSAND/UL (ref 4.31–10.16)

## 2019-12-03 PROCEDURE — 36415 COLL VENOUS BLD VENIPUNCTURE: CPT

## 2019-12-03 PROCEDURE — 83970 ASSAY OF PARATHORMONE: CPT

## 2019-12-03 PROCEDURE — 82306 VITAMIN D 25 HYDROXY: CPT

## 2019-12-03 PROCEDURE — 82570 ASSAY OF URINE CREATININE: CPT

## 2019-12-03 PROCEDURE — 84156 ASSAY OF PROTEIN URINE: CPT

## 2019-12-03 PROCEDURE — 84100 ASSAY OF PHOSPHORUS: CPT

## 2019-12-03 PROCEDURE — 85025 COMPLETE CBC W/AUTO DIFF WBC: CPT

## 2019-12-03 PROCEDURE — 83735 ASSAY OF MAGNESIUM: CPT

## 2019-12-03 PROCEDURE — 80048 BASIC METABOLIC PNL TOTAL CA: CPT

## 2019-12-29 ENCOUNTER — APPOINTMENT (EMERGENCY)
Dept: CT IMAGING | Facility: HOSPITAL | Age: 79
DRG: 683 | End: 2019-12-29
Payer: MEDICARE

## 2019-12-29 ENCOUNTER — HOSPITAL ENCOUNTER (INPATIENT)
Facility: HOSPITAL | Age: 79
LOS: 4 days | Discharge: NURSING, REHAB OR LONG-TERM CARE | DRG: 683 | End: 2020-01-02
Attending: EMERGENCY MEDICINE | Admitting: STUDENT IN AN ORGANIZED HEALTH CARE EDUCATION/TRAINING PROGRAM
Payer: MEDICARE

## 2019-12-29 ENCOUNTER — APPOINTMENT (EMERGENCY)
Dept: RADIOLOGY | Facility: HOSPITAL | Age: 79
DRG: 683 | End: 2019-12-29
Payer: MEDICARE

## 2019-12-29 DIAGNOSIS — R94.31 EKG ABNORMALITIES: ICD-10-CM

## 2019-12-29 DIAGNOSIS — J90 PLEURAL EFFUSION ON LEFT: ICD-10-CM

## 2019-12-29 DIAGNOSIS — I48.0 PAROXYSMAL ATRIAL FIBRILLATION (HCC): ICD-10-CM

## 2019-12-29 DIAGNOSIS — I50.22 CHRONIC SYSTOLIC CONGESTIVE HEART FAILURE (HCC): ICD-10-CM

## 2019-12-29 DIAGNOSIS — N17.9 AKI (ACUTE KIDNEY INJURY) (HCC): ICD-10-CM

## 2019-12-29 DIAGNOSIS — I71.4 ABDOMINAL AORTIC ANEURYSM (AAA) 3.0 CM TO 5.5 CM IN DIAMETER IN MALE (HCC): ICD-10-CM

## 2019-12-29 DIAGNOSIS — R07.9 CHEST PAIN: Primary | ICD-10-CM

## 2019-12-29 DIAGNOSIS — N18.4 ACUTE RENAL FAILURE SUPERIMPOSED ON STAGE 4 CHRONIC KIDNEY DISEASE, UNSPECIFIED ACUTE RENAL FAILURE TYPE (HCC): ICD-10-CM

## 2019-12-29 DIAGNOSIS — N17.9 ACUTE RENAL FAILURE SUPERIMPOSED ON STAGE 4 CHRONIC KIDNEY DISEASE, UNSPECIFIED ACUTE RENAL FAILURE TYPE (HCC): ICD-10-CM

## 2019-12-29 DIAGNOSIS — I25.5 ISCHEMIC CARDIOMYOPATHY: ICD-10-CM

## 2019-12-29 PROBLEM — D47.2 MGUS (MONOCLONAL GAMMOPATHY OF UNKNOWN SIGNIFICANCE): Status: ACTIVE | Noted: 2019-12-29

## 2019-12-29 PROBLEM — H35.89 MACULAR ATROPHY, RETINAL: Status: ACTIVE | Noted: 2019-12-29

## 2019-12-29 PROBLEM — H40.9 GLAUCOMA: Status: ACTIVE | Noted: 2019-12-29

## 2019-12-29 PROBLEM — R91.1 PULMONARY NODULE: Status: ACTIVE | Noted: 2019-12-29

## 2019-12-29 PROBLEM — R10.84 GENERALIZED ABDOMINAL PAIN: Status: ACTIVE | Noted: 2019-12-29

## 2019-12-29 PROBLEM — R26.2 AMBULATORY DYSFUNCTION: Status: ACTIVE | Noted: 2019-12-29

## 2019-12-29 PROBLEM — I25.10 CORONARY ARTERY DISEASE: Status: ACTIVE | Noted: 2019-12-29

## 2019-12-29 LAB
ALBUMIN SERPL BCP-MCNC: 3.6 G/DL (ref 3.5–5)
ALP SERPL-CCNC: 92 U/L (ref 46–116)
ALT SERPL W P-5'-P-CCNC: 15 U/L (ref 12–78)
ANION GAP SERPL CALCULATED.3IONS-SCNC: 11 MMOL/L (ref 4–13)
APTT PPP: 41 SECONDS (ref 23–37)
AST SERPL W P-5'-P-CCNC: 31 U/L (ref 5–45)
BASOPHILS # BLD AUTO: 0.02 THOUSANDS/ΜL (ref 0–0.1)
BASOPHILS NFR BLD AUTO: 0 % (ref 0–1)
BILIRUB SERPL-MCNC: 0.56 MG/DL (ref 0.2–1)
BILIRUB UR QL STRIP: NEGATIVE
BUN SERPL-MCNC: 67 MG/DL (ref 5–25)
CALCIUM SERPL-MCNC: 9.4 MG/DL (ref 8.3–10.1)
CHLORIDE SERPL-SCNC: 106 MMOL/L (ref 100–108)
CLARITY UR: CLEAR
CLARITY, POC: CLEAR
CO2 SERPL-SCNC: 20 MMOL/L (ref 21–32)
COLOR UR: YELLOW
COLOR, POC: YELLOW
CREAT SERPL-MCNC: 4.28 MG/DL (ref 0.6–1.3)
EOSINOPHIL # BLD AUTO: 0.27 THOUSAND/ΜL (ref 0–0.61)
EOSINOPHIL NFR BLD AUTO: 5 % (ref 0–6)
ERYTHROCYTE [DISTWIDTH] IN BLOOD BY AUTOMATED COUNT: 13.2 % (ref 11.6–15.1)
GFR SERPL CREATININE-BSD FRML MDRD: 12 ML/MIN/1.73SQ M
GLUCOSE SERPL-MCNC: 99 MG/DL (ref 65–140)
GLUCOSE UR STRIP-MCNC: NEGATIVE MG/DL
HCT VFR BLD AUTO: 36.4 % (ref 36.5–49.3)
HGB BLD-MCNC: 11.1 G/DL (ref 12–17)
HGB UR QL STRIP.AUTO: NEGATIVE
IMM GRANULOCYTES # BLD AUTO: 0.02 THOUSAND/UL (ref 0–0.2)
IMM GRANULOCYTES NFR BLD AUTO: 0 % (ref 0–2)
INR PPP: 2.25 (ref 0.84–1.19)
KETONES UR STRIP-MCNC: NEGATIVE MG/DL
LACTATE SERPL-SCNC: 0.7 MMOL/L (ref 0.5–2)
LACTATE SERPL-SCNC: 0.9 MMOL/L (ref 0.5–2)
LEUKOCYTE ESTERASE UR QL STRIP: NEGATIVE
LIPASE SERPL-CCNC: 609 U/L (ref 73–393)
LYMPHOCYTES # BLD AUTO: 1.1 THOUSANDS/ΜL (ref 0.6–4.47)
LYMPHOCYTES NFR BLD AUTO: 20 % (ref 14–44)
MAGNESIUM SERPL-MCNC: 2.4 MG/DL (ref 1.6–2.6)
MCH RBC QN AUTO: 31.6 PG (ref 26.8–34.3)
MCHC RBC AUTO-ENTMCNC: 30.5 G/DL (ref 31.4–37.4)
MCV RBC AUTO: 104 FL (ref 82–98)
MONOCYTES # BLD AUTO: 0.41 THOUSAND/ΜL (ref 0.17–1.22)
MONOCYTES NFR BLD AUTO: 8 % (ref 4–12)
NEUTROPHILS # BLD AUTO: 3.57 THOUSANDS/ΜL (ref 1.85–7.62)
NEUTS SEG NFR BLD AUTO: 67 % (ref 43–75)
NITRITE UR QL STRIP: NEGATIVE
NRBC BLD AUTO-RTO: 0 /100 WBCS
NT-PROBNP SERPL-MCNC: 4090 PG/ML
NT-PROBNP SERPL-MCNC: 4541 PG/ML
PH UR STRIP.AUTO: 5.5 [PH] (ref 4.5–8)
PLATELET # BLD AUTO: 187 THOUSANDS/UL (ref 149–390)
PMV BLD AUTO: 9.8 FL (ref 8.9–12.7)
POTASSIUM SERPL-SCNC: 4.4 MMOL/L (ref 3.5–5.3)
PROT SERPL-MCNC: 8.5 G/DL (ref 6.4–8.2)
PROT UR STRIP-MCNC: NEGATIVE MG/DL
PROTHROMBIN TIME: 25.3 SECONDS (ref 11.6–14.5)
RBC # BLD AUTO: 3.51 MILLION/UL (ref 3.88–5.62)
SODIUM SERPL-SCNC: 137 MMOL/L (ref 136–145)
SP GR UR STRIP.AUTO: 1.01 (ref 1–1.03)
TROPONIN I SERPL-MCNC: 0.02 NG/ML
TSH SERPL DL<=0.05 MIU/L-ACNC: 2.43 UIU/ML (ref 0.36–3.74)
UROBILINOGEN UR QL STRIP.AUTO: 0.2 E.U./DL
WBC # BLD AUTO: 5.39 THOUSAND/UL (ref 4.31–10.16)

## 2019-12-29 PROCEDURE — 74176 CT ABD & PELVIS W/O CONTRAST: CPT

## 2019-12-29 PROCEDURE — 36415 COLL VENOUS BLD VENIPUNCTURE: CPT

## 2019-12-29 PROCEDURE — 80053 COMPREHEN METABOLIC PANEL: CPT

## 2019-12-29 PROCEDURE — 70450 CT HEAD/BRAIN W/O DYE: CPT

## 2019-12-29 PROCEDURE — 81001 URINALYSIS AUTO W/SCOPE: CPT | Performed by: NURSE PRACTITIONER

## 2019-12-29 PROCEDURE — 83605 ASSAY OF LACTIC ACID: CPT | Performed by: EMERGENCY MEDICINE

## 2019-12-29 PROCEDURE — 83605 ASSAY OF LACTIC ACID: CPT | Performed by: NURSE PRACTITIONER

## 2019-12-29 PROCEDURE — 99285 EMERGENCY DEPT VISIT HI MDM: CPT

## 2019-12-29 PROCEDURE — 84443 ASSAY THYROID STIM HORMONE: CPT | Performed by: EMERGENCY MEDICINE

## 2019-12-29 PROCEDURE — 83880 ASSAY OF NATRIURETIC PEPTIDE: CPT | Performed by: EMERGENCY MEDICINE

## 2019-12-29 PROCEDURE — 84484 ASSAY OF TROPONIN QUANT: CPT

## 2019-12-29 PROCEDURE — 93005 ELECTROCARDIOGRAM TRACING: CPT

## 2019-12-29 PROCEDURE — 99285 EMERGENCY DEPT VISIT HI MDM: CPT | Performed by: EMERGENCY MEDICINE

## 2019-12-29 PROCEDURE — 71250 CT THORAX DX C-: CPT

## 2019-12-29 PROCEDURE — 99222 1ST HOSP IP/OBS MODERATE 55: CPT | Performed by: INTERNAL MEDICINE

## 2019-12-29 PROCEDURE — 96360 HYDRATION IV INFUSION INIT: CPT

## 2019-12-29 PROCEDURE — 85730 THROMBOPLASTIN TIME PARTIAL: CPT | Performed by: EMERGENCY MEDICINE

## 2019-12-29 PROCEDURE — 83690 ASSAY OF LIPASE: CPT | Performed by: EMERGENCY MEDICINE

## 2019-12-29 PROCEDURE — 83880 ASSAY OF NATRIURETIC PEPTIDE: CPT | Performed by: NURSE PRACTITIONER

## 2019-12-29 PROCEDURE — 85610 PROTHROMBIN TIME: CPT | Performed by: EMERGENCY MEDICINE

## 2019-12-29 PROCEDURE — 99222 1ST HOSP IP/OBS MODERATE 55: CPT | Performed by: STUDENT IN AN ORGANIZED HEALTH CARE EDUCATION/TRAINING PROGRAM

## 2019-12-29 PROCEDURE — 83735 ASSAY OF MAGNESIUM: CPT | Performed by: NURSE PRACTITIONER

## 2019-12-29 PROCEDURE — 84484 ASSAY OF TROPONIN QUANT: CPT | Performed by: NURSE PRACTITIONER

## 2019-12-29 PROCEDURE — 71046 X-RAY EXAM CHEST 2 VIEWS: CPT

## 2019-12-29 PROCEDURE — 85025 COMPLETE CBC W/AUTO DIFF WBC: CPT

## 2019-12-29 PROCEDURE — 81003 URINALYSIS AUTO W/O SCOPE: CPT

## 2019-12-29 RX ORDER — PANTOPRAZOLE SODIUM 40 MG/1
40 TABLET, DELAYED RELEASE ORAL DAILY
Status: DISCONTINUED | OUTPATIENT
Start: 2019-12-29 | End: 2020-01-02 | Stop reason: HOSPADM

## 2019-12-29 RX ORDER — WARFARIN SODIUM 3 MG/1
3 TABLET ORAL
Status: DISCONTINUED | OUTPATIENT
Start: 2019-12-29 | End: 2020-01-02 | Stop reason: HOSPADM

## 2019-12-29 RX ORDER — ATORVASTATIN CALCIUM 80 MG/1
80 TABLET, FILM COATED ORAL
Status: DISCONTINUED | OUTPATIENT
Start: 2019-12-29 | End: 2020-01-02 | Stop reason: HOSPADM

## 2019-12-29 RX ORDER — TAMSULOSIN HYDROCHLORIDE 0.4 MG/1
0.8 CAPSULE ORAL
Status: DISCONTINUED | OUTPATIENT
Start: 2019-12-29 | End: 2020-01-02 | Stop reason: HOSPADM

## 2019-12-29 RX ORDER — ACETAMINOPHEN 325 MG/1
650 TABLET ORAL EVERY 6 HOURS PRN
Status: DISCONTINUED | OUTPATIENT
Start: 2019-12-29 | End: 2020-01-02 | Stop reason: HOSPADM

## 2019-12-29 RX ORDER — GABAPENTIN 300 MG/1
300 CAPSULE ORAL DAILY
Status: DISCONTINUED | OUTPATIENT
Start: 2019-12-29 | End: 2020-01-02 | Stop reason: HOSPADM

## 2019-12-29 RX ORDER — HYDRALAZINE HYDROCHLORIDE 25 MG/1
50 TABLET, FILM COATED ORAL EVERY 8 HOURS SCHEDULED
Status: DISCONTINUED | OUTPATIENT
Start: 2019-12-29 | End: 2019-12-29

## 2019-12-29 RX ORDER — SODIUM CHLORIDE, SODIUM GLUCONATE, SODIUM ACETATE, POTASSIUM CHLORIDE, MAGNESIUM CHLORIDE, SODIUM PHOSPHATE, DIBASIC, AND POTASSIUM PHOSPHATE .53; .5; .37; .037; .03; .012; .00082 G/100ML; G/100ML; G/100ML; G/100ML; G/100ML; G/100ML; G/100ML
50 INJECTION, SOLUTION INTRAVENOUS CONTINUOUS
Status: DISCONTINUED | OUTPATIENT
Start: 2019-12-29 | End: 2019-12-30

## 2019-12-29 RX ORDER — ISOSORBIDE DINITRATE 10 MG/1
20 TABLET ORAL
Status: DISCONTINUED | OUTPATIENT
Start: 2019-12-29 | End: 2020-01-02 | Stop reason: HOSPADM

## 2019-12-29 RX ORDER — HYDRALAZINE HYDROCHLORIDE 25 MG/1
50 TABLET, FILM COATED ORAL EVERY 8 HOURS SCHEDULED
Status: DISCONTINUED | OUTPATIENT
Start: 2019-12-29 | End: 2020-01-02 | Stop reason: HOSPADM

## 2019-12-29 RX ORDER — ASPIRIN 81 MG/1
324 TABLET, CHEWABLE ORAL ONCE
Status: COMPLETED | OUTPATIENT
Start: 2019-12-29 | End: 2019-12-29

## 2019-12-29 RX ORDER — METOPROLOL SUCCINATE 25 MG/1
25 TABLET, EXTENDED RELEASE ORAL DAILY
Status: DISCONTINUED | OUTPATIENT
Start: 2019-12-29 | End: 2020-01-02 | Stop reason: HOSPADM

## 2019-12-29 RX ADMIN — HYDRALAZINE HYDROCHLORIDE 50 MG: 25 TABLET ORAL at 15:12

## 2019-12-29 RX ADMIN — GABAPENTIN 300 MG: 300 CAPSULE ORAL at 15:12

## 2019-12-29 RX ADMIN — PANTOPRAZOLE SODIUM 40 MG: 40 TABLET, DELAYED RELEASE ORAL at 15:13

## 2019-12-29 RX ADMIN — WARFARIN SODIUM 3 MG: 3 TABLET ORAL at 17:13

## 2019-12-29 RX ADMIN — ATORVASTATIN CALCIUM 80 MG: 80 TABLET, FILM COATED ORAL at 17:12

## 2019-12-29 RX ADMIN — SODIUM CHLORIDE, SODIUM GLUCONATE, SODIUM ACETATE, POTASSIUM CHLORIDE, MAGNESIUM CHLORIDE, SODIUM PHOSPHATE, DIBASIC, AND POTASSIUM PHOSPHATE 60 ML/HR: .53; .5; .37; .037; .03; .012; .00082 INJECTION, SOLUTION INTRAVENOUS at 17:12

## 2019-12-29 RX ADMIN — SODIUM CHLORIDE 500 ML: 0.9 INJECTION, SOLUTION INTRAVENOUS at 11:34

## 2019-12-29 RX ADMIN — ISOSORBIDE DINITRATE 20 MG: 10 TABLET ORAL at 17:12

## 2019-12-29 RX ADMIN — ASPIRIN 81 MG 324 MG: 81 TABLET ORAL at 12:00

## 2019-12-29 RX ADMIN — METOPROLOL SUCCINATE 25 MG: 25 TABLET, EXTENDED RELEASE ORAL at 15:13

## 2019-12-29 RX ADMIN — TAMSULOSIN HYDROCHLORIDE 0.8 MG: 0.4 CAPSULE ORAL at 17:12

## 2019-12-29 NOTE — ED NOTES
[employer] returned call to confirm they received the information        Hilario Godwin RN  12/29/19 [id number]

## 2019-12-29 NOTE — ASSESSMENT & PLAN NOTE
Wt Readings from Last 3 Encounters:   12/29/19 70 7 kg (155 lb 13 8 oz)   09/23/19 77 1 kg (170 lb)   08/20/19 73 2 kg (161 lb 6 4 oz)     · Ischemic cardiomyopathy, last echo 2/2019 with EF 29%, s/p ICD   · On Lasix 40 mg BID, hold with SHAHIDA  · BNP pending   · Patient's daughter reports that he drinks a lot of soda  · CT chest with chronic loculated pleural effusion  · + dyspnea  · Appears euvolemic  · Cardiology consult

## 2019-12-29 NOTE — ASSESSMENT & PLAN NOTE
· Nonspecific  · CT A/P unremarkable  · Continue PPI therapy per home regimen  · Monitor for symptoms

## 2019-12-29 NOTE — ASSESSMENT & PLAN NOTE
· Patient resides with daughter who reports frequent falls recently  His daughter attributes this to dragging his right foot due to right leg pain secondary to vascular disease    · Ambulates with a walker  · PT/OT evaluation

## 2019-12-29 NOTE — ASSESSMENT & PLAN NOTE
· For the last few weeks, brief episodes of left sided chest pain, not associated with activity    · Cardiac history of CAD, s/p CABG x 3 (2016), ischemic cardiomyopathy s/p ICD and EF 29%, PAF on Coumadin  · Follows with Cardiology through [facility], last echo 2/2019  · Initial troponin normal, perform serial troponins / EKG's  · Monitor on telemetry  · Consult to Cardiology

## 2019-12-29 NOTE — ASSESSMENT & PLAN NOTE
· Follows with Nephrology through [facility]  · Baseline creatinine 2 7-3 2  · Previously on dialysis, does not want to be on dialysis again  · Given IV fluid bolus of 500 ml in ER  · Avoid nephrotoxins / hypotension  · Diuretic on hold  · Monitor BMP  · Assess UA / PVR  Lab Results   Component Value Date    CREATININE 4 28 (H) 12/29/2019    CREATININE 3 71 (H) 12/03/2019

## 2019-12-29 NOTE — ED PROVIDER NOTES
History  Chief Complaint   Patient presents with    Chest Pain     Pt reports that he has been having intermittent chest pain and abd pain for the past few days  Reports he feels tired and weak  Reports headache    Constipation     Pt reports he has not moved his bowels in 1 week  Patient is an 79-year-old male with a past medical history significant for atrial fibrillation on Coumadin, CHF, CKD, hypertension, dementia, glaucoma who presents with intermittent episodes of chest pain and abdominal pain throughout the last 2 weeks  Patient is not a reliable historian secondary to his dementia and is only able to answer some questions but does definitively state that currently he is not having any pain  Patient's daughter with whom he lives reports that over the last 2 weeks he has intermittently been complaining of chest and abdominal pain for which she has been asking him to go see the primary care provider but he has been refusing  Today she decided that he needed to get evaluated and brought him in to the emergency department  Reports that he has been eating and drinking less than baseline  Prior to Admission Medications   Prescriptions Last Dose Informant Patient Reported?  Taking?   atorvastatin (LIPITOR) 80 mg tablet 12/28/2019 at Unknown time  Yes Yes   Sig: Take 80 mg by mouth daily   diphenhydrAMINE (BENADRYL) 50 MG tablet  Outside Facility (Specify) Yes No   Sig: Take 25 mg by mouth every 6 (six) hours as needed for itching    furosemide (LASIX) 40 mg tablet 12/29/2019 at Unknown time  Yes Yes   Sig: Take 40 mg by mouth 2 (two) times a day   gabapentin (NEURONTIN) 300 mg capsule 12/29/2019 at Unknown time  Yes Yes   Sig: Take 300 mg by mouth daily   hydrALAZINE (APRESOLINE) 25 mg tablet 12/29/2019 at Unknown time  Yes Yes   Sig: Take 50 mg by mouth Three times a day   hydrocortisone 1 % cream   No No   Sig: Apply topically 4 (four) times a day as needed for irritation for up to 5 days (DO NOT USE FOR MORE THAN 5 DAYS)   isosorbide dinitrate (ISORDIL) 20 mg tablet 2019 at Unknown time  Yes Yes   Sig: Take 20 mg by mouth Three times a day   metoprolol succinate (TOPROL-XL) 25 mg 24 hr tablet 2019 at Unknown time  Yes Yes   Sig: Take 25 mg by mouth daily   pantoprazole (PROTONIX) 40 mg tablet 2019 at Unknown time  Yes Yes   Sig: Take 40 mg by mouth daily   potassium chloride (K-DUR,KLOR-CON) 10 mEq tablet 2019 at Unknown time Outside Facility ([address] [place] [address]) Yes Yes   Sig: Take 10 mEq by mouth daily   predniSONE 10 mg tablet   No No   Sig: Days 1-2: 3 tablets daily; Days 3-4: 2 tablets daily; Days 5-6: 1 tablets daily then stop   tamsulosin (FLOMAX) 0 4 mg 2019 at Unknown time  Yes Yes   Sig: Take 0 8 mg by mouth   warfarin (COUMADIN) 3 mg tablet 2019 at Unknown time  Yes Yes   Sig: Take 3 mg by mouth daily      Facility-Administered Medications: None       Past Medical History:   Diagnosis Date    A-fib ([facility] )     [facility]F (congestive heart failure) ([facility])     CKD (chronic kidney disease) stage 4, GFR 15-29 ml/min ([facility])     GERD (gastroesophageal reflux disease)     Glaucoma     Hypertension     Renal disorder        Past Surgical History:   Procedure Laterality Date    ABDOMINAL SURGERY      CARDIAC PACEMAKER PLACEMENT         Family History   Problem Relation Age of Onset    No Known Problems Mother     No Known Problems Father      I have reviewed and agree with the history as documented      Social History     Tobacco Use    Smoking status: Former Smoker     Packs/day: 2 00     Years: 30 00     Pack years: 60 00     Last attempt to quit: 1980     Years since quittin 0    Smokeless tobacco: Never Used    Tobacco comment: quit 40 years ago   Substance Use Topics    Alcohol use: Never     Frequency: Never     Drinks per session: Patient refused     Binge frequency: Patient refused    Drug use: Never        Review of Systems   Unable to perform ROS: Dementia Physical Exam  Physical Exam   Constitutional: He appears well-developed and well-nourished  No distress  HENT:   Head: Normocephalic and atraumatic  Right Ear: External ear normal    Left Ear: External ear normal    Nose: Nose normal    Mouth/Throat: Oropharynx is clear and moist  No oropharyngeal exudate  Eyes: Pupils are equal, round, and reactive to light  Conjunctivae and EOM are normal    Able to see how many fingers at 6 feet with both eyes together, but 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   Neck: Normal range of motion  Neck supple  Cardiovascular: Normal rate, regular rhythm, normal heart sounds and intact distal pulses  Exam reveals no gallop and no friction rub  No murmur heard  Pulmonary/Chest: Effort normal  No stridor  He has no wheezes  He has no rales  He exhibits no tenderness  Diminished breath sound bilaterally   Abdominal: Soft  Bowel sounds are normal  He exhibits no distension  There is no tenderness  There is no rebound and no guarding  Musculoskeletal: Normal range of motion  He exhibits no edema or tenderness  Neurological: He is alert  He is disoriented  GCS eye subscore is 4  GCS verbal subscore is 4  GCS motor subscore is 6  Moves all 4 extremities equally      Skin: Skin is warm and dry  He is not diaphoretic  Nursing note and vitals reviewed        Vital Signs  ED Triage Vitals [12/29/19 1056]   Temperature Pulse Respirations Blood Pressure SpO2   97 5 °F (36 4 °C) 63 18 132/74 98 %      Temp Source Heart Rate Source Patient Position - Orthostatic VS BP Location FiO2 (%)   Oral Monitor Sitting Right arm --      Pain Score       7           Vitals:    12/29/19 1056 12/29/19 1130 12/29/19 1300   BP: 132/74 123/71 120/74   Pulse: 63 65 60   Patient Position - Orthostatic VS: Sitting Lying Lying         Visual Acuity      ED Medications  Medications   sodium chloride 0 9 % bolus 500 mL (0 mL Intravenous Stopped 12/29/19 1238)   aspirin chewable tablet 324 mg (324 mg Oral Given 12/29/19 1200)       Diagnostic Studies  Results Reviewed     Procedure Component Value Units Date/Time    Lipase [[id number]] Collected:  12/29/19 1110    Lab Status: In process Specimen:  Blood from Arm, Left Updated:  12/29/19 1308    NT-BNP PRO [[id number]] Collected:  12/29/19 1110    Lab Status: In process Specimen:  Blood from Arm, Left Updated:  12/29/19 1308    TSH [[id number]] Collected:  12/29/19 1110    Lab Status: In process Specimen:  Blood from Arm, Left Updated:  12/29/19 1308    Protime-INR [[id number]]  (Abnormal) Collected:  12/29/19 1205    Lab Status:  Final result Specimen:  Blood from Arm, Right Updated:  12/29/19 1240     Protime 25 3 seconds      INR 2 25    APTT [[id number]]  (Abnormal) Collected:  12/29/19 1205    Lab Status:  Final result Specimen:  Blood from Arm, Right Updated:  12/29/19 1240     PTT 41 seconds     Lactic acid, plasma x2 [[id number]]  (Normal) Collected:  12/29/19 1133    Lab Status:  Final result Specimen:  Blood from Arm, Left Updated:  12/29/19 1204     LACTIC ACID 0 9 mmol/L     Narrative:       Result may be elevated if tourniquet was used during collection      Comprehensive metabolic panel [[id number]]  (Abnormal) Collected:  12/29/19 1110    Lab Status:  Final result Specimen:  Blood from Arm, Left Updated:  12/29/19 1137     Sodium 137 mmol/L      Potassium 4 4 mmol/L      Chloride 106 mmol/L      CO2 20 mmol/L      ANION GAP 11 mmol/L      BUN 67 mg/dL      Creatinine 4 28 mg/dL      Glucose 99 mg/dL      Calcium 9 4 mg/dL      AST 31 U/L      ALT 15 U/L      Alkaline Phosphatase 92 U/L      Total Protein 8 5 g/dL      Albumin 3 6 g/dL      Total Bilirubin 0 56 mg/dL      eGFR 12 ml/min/1 73sq m     Narrative:       Tomás guidelines for Chronic Kidney Disease (CKD):     Stage 1 with normal or high GFR (GFR > 90 mL/min/1 73 square meters)    Stage 2 Mild CKD (GFR = 60-89 mL/min/1 73 square meters)    Stage 3A Moderate CKD (GFR = 45-59 mL/min/1 73 square meters)    Stage 3B Moderate CKD (GFR = 30-44 mL/min/1 73 square meters)    Stage 4 Severe CKD (GFR = 15-29 mL/min/1 73 square meters)    Stage 5 End Stage CKD (GFR <15 mL/min/1 73 square meters)  Note: GFR calculation is accurate only with a steady state creatinine    Troponin I [[id number]]  (Normal) Collected:  12/29/19 1110    Lab Status:  Final result Specimen:  Blood from Arm, Left Updated:  12/29/19 1136     Troponin I 0 02 ng/mL     POCT urinalysis dipstick [[id number]]     Lab Status:  No result Specimen:  Urine     Lactic acid, plasma x2 [[id number]]     Lab Status:  No result Specimen:  Blood     CBC and differential [[id number]]  (Abnormal) Collected:  12/29/19 1110    Lab Status:  Final result Specimen:  Blood from Arm, Left Updated:  12/29/19 1114     WBC 5 39 Thousand/uL      RBC 3 51 Million/uL      Hemoglobin 11 1 g/dL      Hematocrit 36 4 %       fL      MCH 31 6 pg      MCHC 30 5 g/dL      RDW 13 2 %      MPV 9 8 fL      Platelets 260 Thousands/uL      nRBC 0 /100 WBCs      Neutrophils Relative 67 %      Immat GRANS % 0 %      Lymphocytes Relative 20 %      Monocytes Relative 8 %      Eosinophils Relative 5 %      Basophils Relative 0 %      Neutrophils Absolute 3 57 Thousands/µL      Immature Grans Absolute 0 02 Thousand/uL      Lymphocytes Absolute 1 10 Thousands/µL      Monocytes Absolute 0 41 Thousand/µL      Eosinophils Absolute 0 27 Thousand/µL      Basophils Absolute 0 02 Thousands/µL                  CT head without contrast   Final Result by Paris Minor MD (12/29 1206)      No acute intracranial abnormality  Microangiopathic changes  Workstation performed: [id number]         CT chest abdomen pelvis wo contrast   Final Result by Paris Minor MD (12/29 2486)      1  Partially loculated left pleural effusion, described on the previous study and likely chronic    Overlying opacity was also described previously and most consistent with atelectasis or scarring  2   Focal opacity along the left major fissure, not specifically described on the previous study though chronicity is uncertain  Possibilities include additional atelectasis, pneumonia, and neoplasm  The prior CT will be obtained for comparison and an    addendum will be issued  3   4 mm right apical lung nodule, presumably stable from the prior CT  A 3 mm left upper lobe nodule was not described previously  Based on current Fleischner Society 2017 Guidelines on incidental pulmonary nodule, no routine follow-up is needed if the    patient is considered low risk for lung cancer  If the patient is considered high risk for lung cancer, 12 month follow-up non-contrast chest CT is recommended  4   No acute abnormality in the abdomen or pelvis  5   4 cm infrarenal abdominal aortic aneurysm, unchanged  The study was marked in [facility] for immediate notification  Workstation performed: [id number]         XR chest 2 views   ED Interpretation by Nikkie Locke DO (12/29 2388)   Abnormal   Left sided effusion versus atelectasis versus bleb as interpreted by me independently                  Procedures  ECG 12 Lead Documentation Only  Date/Time: 12/29/2019 11:41 AM  Performed by: Nikkie Locke DO  Authorized by: Nikkie Locke DO     Indications / Diagnosis:  Intermittent chest pain  ECG reviewed by me, the ED Provider: yes    Patient location:  ED  Previous ECG:     Previous ECG:  Unavailable  Interpretation:     Interpretation: abnormal    Rate:     ECG rate:  65    ECG rate assessment: normal    Rhythm:     Rhythm: sinus rhythm    Ectopy:     Ectopy: none    QRS:     QRS axis:  Left    QRS intervals:   Wide  Conduction:     Conduction: abnormal      Abnormal conduction: non-specific intraventricular conduction delay    ST segments:     ST segments:  Abnormal    Depression:  I, II, III, aVL and V2  T waves:     T waves: inverted      Inverted:  I, II and III             ED Course  ED Course as of Dec 29 1327   Sun Dec 29, 2019   1138 SHAHIDA on CKD   Creatinine(!): 4 28   1244 1  Partially loculated left pleural effusion, described on the previous study and likely chronic  Overlying opacity was also described previously and most consistent with atelectasis or scarring      2  Focal opacity along the left major fissure, not specifically described on the previous study though chronicity is uncertain  Possibilities include additional atelectasis, pneumonia, and neoplasm  The prior CT will be obtained for comparison and an   addendum will be issued      3   4 mm right apical lung nodule, presumably stable from the prior CT  A 3 mm left upper lobe nodule was not described previously  Based on current Fleischner Society 2017 Guidelines on incidental pulmonary nodule, no routine follow-up is needed if the   patient is considered low risk for lung cancer  If the patient is considered high risk for lung cancer, 12 month follow-up non-contrast chest CT is recommended      4  No acute abnormality in the abdomen or pelvis      5   4 cm infrarenal abdominal aortic aneurysm, unchanged  12 Discussed CT imaging findings with patient's daughter regarding chronic lung findings ad aortic aneurysm  Discussed SHAHIDA and EKG abnormalities  Daughter does not know which pacemaker patient has to interrogate  MDM  Number of Diagnoses or Management Options  Abdominal aortic aneurysm (AAA) 3 0 cm to 5 5 cm in diameter in male [facility]):   SHAHIDA (acute kidney injury) [facility]):   Chest pain:   EKG abnormalities:   Pleural effusion on left, chronic:   Diagnosis management comments: Assessment and Plan:   Intermittent chest and abdominal pain x 2 weeks with decreased PO intake  Cardiac workup  Will interrogate pacemaker if able to figure out which company patient has     Abdominal pain- will check labs to assess for electrolyte abnormalities, anemia, leukocytosis, liver dysfunction, lipase to rule out pancreatitis  Will check urinalysis to rule out UTI  Will get a CT scan of the chest, abdomen pelvis to further assess as patient is an unreliable historian secondary his dementia and patient has had ongoing symptoms for 2 weeks  EKG shows ST depressions in multiple leads with no comparison EKG available  Troponin negative  Not currently having chest pain  Warrants further evaluation upon admission  Patient has chronic kidney disease, but today his creatinine is 4 88 which is acutely elevated  Also, patient has chronic lung findings on imaging as well as an aortic aneurysm which I informed the patient and his daughter about            Disposition  Final diagnoses:   Chest pain   EKG abnormalities   SHAHIDA (acute kidney injury) ([facility] )   Abdominal aortic aneurysm (AAA) 3 0 cm to 5 5 cm in diameter in male [facility])   Pleural effusion on left, chronic     Time reflects when diagnosis was documented in both MDM as applicable and the Disposition within this note     Time User Action Codes Description Comment    12/29/2019 12:59 PM Zollie Lunch Add [R07 9] Chest pain     12/29/2019 12:59 PM Zollie Lunch Add [R94 31] EKG abnormalities     12/29/2019 12:59 PM Zollie Lunch Add [N17 9] SHAHIDA (acute kidney injury) ([facility] )     12/29/2019 12:59 PM Zollie Lunch Add [I71 4] Abdominal aortic aneurysm (AAA) 3 0 cm to 5 5 cm in diameter in male ([facility] )     12/29/2019  1:00 PM Zollie Lunch Add [J90] Pleural effusion on left     12/29/2019  1:00 PM Zollie Lunch Modify [J90] Pleural effusion on left, chronic       ED Disposition     ED Disposition Condition Date/Time Comment    Admit Stable Sun Dec 29, 2019 12:59 PM Case was discussed with Dr Cisco Chinchilla and the patient's admission status was agreed to be Admission Status: inpatient status to the service of Dr Cisco Chinchilla          Follow-up Information    None         Patient's Medications   Discharge Prescriptions    No medications on file     No discharge procedures on file      ED Provider  Electronically Signed by           Itzel Walters DO  12/29/19 2183

## 2019-12-29 NOTE — ASSESSMENT & PLAN NOTE
· Maintained on Coumadin and Metoprolol  Rate controlled    · Monitor on telemetry  · Monitor daily INR

## 2019-12-29 NOTE — ASSESSMENT & PLAN NOTE
· S/p angioplasty in right leg  · Followed by Vascular through [facility]  · Reports chronic pain in right leg with foot drop

## 2019-12-29 NOTE — CONSULTS
[facility] [de-identified] y o  male MRN: [id number]  Unit/Bed#: E4 -01 Encounter: [id number]    ASSESSMENT and PLAN:  1  Acute kidney injury (POA):  · As mentioned patient has a history of dialysis dependency for several months  Patient unclear how long ago he was on dialysis  · Baseline creatinine as of August 2 85 -3 2 but in early December creatinine elevated to 3 7  · Urinalysis:  Cuming  · No evidence of hydronephrosis on CT  · Patient presents with a creatinine of 4 28    · No evidence of volume overload  · Etiology:  Suspect pre renal azotemia in the setting of diuretic use  May be an element of progression of disease  No evidence of obstructive uropathy on CT but will check PVR due to complaints of frequent urination  On Flomax  · Plan:  · Hold diuretic  · Check PVR to assess for urinary retention  · Gentle IV fluids isolyte at 50 mL/hour  · Check labs in the a m  · Avoid nephrotoxic agents, hypotension  · Hold parameters adjusted on hydralazine  2  Chronic kidney disease, stage IV:    · Baseline creatinine 2 7-3 2 as of August 2019  · Etiology likely cardiorenal, arteriolar nephrosclerosis, hypertensive nephrosclerosis  · Seen in the nephrology clinic following hospitalization in July due to acute kidney injury thought to be related to over diuresis  · No follow-up  3  Chest pain:  · CT of the chest abdomen and pelvis reviewed  · Stable lung nodule ,New lung nodule noted on CT  4  Chronic systolic CHF/ischemic cardiomyopathy:  Status post ICD placement  Ejection fraction 29% with severe global LV systolic dysfunction  · Weight at last office visit in July 2019 161 lb  · Weight 155 lb on admission  · No evidence of decompensated heart failure/volume overload  · Hold diuretic closely monitor  5  Abdominal pain:  Elevated lipase 609  Liver function studies normal   6  Hypertension:  Blood pressure acceptable on metoprolol, hydralazine and Imdur   7   Electrolytes: Potassium and magnesium levels acceptable  8  Low bicarbonate:  Likely related to acute renal failure  9  Anemia:    · The setting of severe CKD/chronic disease  · Hemoglobin 11 1 which appears to be slightly above baseline  10  PAD:  Prior angioplasty  11  Ambulatory dysfunction:  CT of the head negative for acute pathology      HISTORY OF PRESENT ILLNESS:  Requesting Physician: Breonna Hylton MD  Reason for Consult:  Acute kidney injury    Danny Beavers is a [de-identified] y o  male with a history of chronic kidney disease stage 4, hypertension, chronic systolic CHF, ischemic cardiomyopathy status post ICD , CAD prior CABG 2016, PAF, anemia, infrarenal abdominal aortic aneurysm who was admitted to [facility] after presenting with chest pain  The patient states that he has been feeling bad a shin marks for several weeks  He reports frequent urination  He will urinate and then lay back down in bed and after go immediately again  He reports drinking adequate amounts of fluids such as soda, water, tea  He is a little bit unclear regarding intake of food  He states that he eats well when he likes the food  He denies use of NSAIDs  He denies nausea, vomiting, diarrhea, fevers and chills but he reports feeling hot at times  He has had no weight change  He complains of abdominal bloating and constipation  Patient has a history of dialysis dependency  He states he was on dialysis for several months  He states he never wants to go back on dialysis again because it made him feel so bad with significant cramping  When he was on dialysis he had a central venous catheter  He was previously followed by a nephrologist in [facility]  Patient was seen and examined  He currently appears comfortable at rest  He has no edema  No family in attendance  Patient was previously seen by our service  His last office visit was in August   At that time baseline creatinine was between 2 7-3 2  Follow-up was planned in 3 months but there have been no subsequent office visits  Labs earlier in December creatinine up to 3 7  He presents with a creatinine of 4 28 prompting nephrology consult      PAST MEDICAL HISTORY:  Past Medical History:   Diagnosis Date    A-fib [facility])     CHF (congestive heart failure) (HCC)     CKD (chronic kidney disease) stage 4, GFR 15-29 ml/min (HCC)     GERD (gastroesophageal reflux disease)     Glaucoma     Hypertension     Renal disorder        PAST SURGICAL HISTORY:  Past Surgical History:   Procedure Laterality Date    ABDOMINAL SURGERY      CARDIAC PACEMAKER PLACEMENT         ALLERGIES:  No Known Allergies    SOCIAL HISTORY:  Social History     Substance and Sexual Activity   Alcohol Use Never    Frequency: Never    Drinks per session: Patient refused    Binge frequency: Patient refused     Social History     Substance and Sexual Activity   Drug Use Never     Social History     Tobacco Use   Smoking Status Former Smoker    Packs/day: 2 00    Years: 30 00    Pack years: 60 00    Last attempt to quit: 1980    Years since quittin 0   Smokeless Tobacco Never Used   Tobacco Comment    quit 40 years ago       FAMILY HISTORY:  Family History   Problem Relation Age of Onset    No Known Problems Mother     No Known Problems Father        MEDICATIONS:    Current Facility-Administered Medications:     acetaminophen (TYLENOL) tablet 650 mg, 650 mg, Oral, Q6H PRN, Tyler Poag, CRNP    atorvastatin (LIPITOR) tablet 80 mg, 80 mg, Oral, After Dinner, Tyler Poag, CRNP    gabapentin (NEURONTIN) capsule 300 mg, 300 mg, Oral, Daily, Tyler Poag, CRNP, 300 mg at 19 1512    hydrALAZINE (APRESOLINE) tablet 50 mg, 50 mg, Oral, Q8H [address], Tyler Poag, CRNP, 50 mg at 19 1512    isosorbide dinitrate (ISORDIL) tablet 20 mg, 20 mg, Oral, TID after meals, Tyler Poag, CRNP    metoprolol succinate (TOPROL-XL) 24 hr tablet 25 mg, 25 mg, Oral, Daily, AUDREY Welsh, 25 mg at 12/29/19 1513    pantoprazole (PROTONIX) EC tablet 40 mg, 40 mg, Oral, Daily, AUDREY Welsh, 40 mg at 12/29/19 1513    tamsulosin (FLOMAX) capsule 0 8 mg, 0 8 mg, Oral, Daily With Dinner, AUDREY Welsh    warfarin (COUMADIN) tablet 3 mg, 3 mg, Oral, Daily (warfarin), AUDREY Welsh    REVIEW OF SYSTEMS:  Constitutional:  Positive for fatigue  HENT: Negative for postnasal drip  Eyes: Negative for visual disturbance  Respiratory: Negative for cough, shortness of breath and wheezing  Cardiovascular positive for chest pain  No palpitations  Gastrointestinal:  Complains of abdominal discomfort, bloating, constipation   Genitourinary:  Frequent urination  Denies hematuria  Musculoskeletal:  Ambulatory dysfunction with right footdrop -chronic  Skin: Negative for rash  Neurological:  No focal weakness or headaches  Hematological: Negative for easy bruising or bleeding      All the systems were reviewed and were negative except as documented on the HPI  PHYSICAL EXAM:  Current Weight: Weight - Scale: 70 5 kg (155 lb 6 8 oz)  First Weight: Weight - Scale: 70 7 kg (155 lb 13 8 oz)  Vitals:    12/29/19 1300 12/29/19 1330 12/29/19 1410 12/29/19 1500   BP: 120/74 137/69 145/74 122/78   BP Location: Right arm Right arm Left arm Left arm   Pulse: 60 64 69 64   Resp: 16 14 18 18   Temp:   98 1 °F (36 7 °C) 97 8 °F (36 6 °C)   TempSrc:   Tympanic Tympanic   SpO2: 96% 96% 95% 96%   Weight:   70 5 kg (155 lb 6 8 oz)    Height:   5' 6" (1 676 m)        Intake/Output Summary (Last 24 hours) at 12/29/2019 1518  Last data filed at 12/29/2019 1344  Gross per 24 hour   Intake 500 ml   Output 375 ml   Net 125 ml     Physical Exam   Constitutional: He is oriented to person, place, and time  He has a sickly appearance  No distress  HENT:   Head: Normocephalic and atraumatic  Eyes: Conjunctivae and EOM are normal    Neck: Normal range of motion  Neck supple  No JVD present  No tracheal deviation present  Cardiovascular: Normal rate, regular rhythm and intact distal pulses  Exam reveals no gallop and no friction rub  No murmur heard  Pulmonary/Chest: Effort normal and breath sounds normal    Abdominal: Soft  Bowel sounds are normal  He exhibits distension  He exhibits no mass  There is no tenderness  There is no rebound and no guarding  Musculoskeletal: Normal range of motion  He exhibits no edema  Neurological: He is alert and oriented to person, place, and time  Skin: Skin is warm and dry  No rash noted  He is not diaphoretic  No erythema  No pallor  Psychiatric: He has a normal mood and affect   His behavior is normal        Invasive Devices:      Lab Results:   Results from last 7 days   Lab Units 12/29/19  1110   WBC Thousand/uL 5 39   HEMOGLOBIN g/dL 11 1*   HEMATOCRIT % 36 4*   PLATELETS Thousands/uL 187   POTASSIUM mmol/L 4 4   CHLORIDE mmol/L 106   CO2 mmol/L 20*   BUN mg/dL 67*   CREATININE mg/dL 4 28*   CALCIUM mg/dL 9 4   MAGNESIUM mg/dL 2 4   ALK PHOS U/L 92   ALT U/L 15   AST U/L 31     Other Studies:

## 2019-12-29 NOTE — ASSESSMENT & PLAN NOTE
· Monitor CBC  Lab Results   Component Value Date    HGB 11 1 (L) 12/29/2019    HGB 10 1 (L) 12/03/2019

## 2019-12-29 NOTE — ED NOTES
SLIM at bedside, RN was able to interrogate pacemaker and see notes in old charts of having a [employer]onic  Phone number called to [employer] to confirm information was received        Farhad Robles RN  12/29/19 5668

## 2019-12-29 NOTE — ASSESSMENT & PLAN NOTE
· CT chest with focal opacity the along the left major fissure not seen previously, possibilities include atelectasis, pneumonia, neoplasm  · Also with 4 mm right apical lung nodule, stable and 3 mm left upper lobe nodule, new  · Recommend follow-up chest CT  · Incidental finding note generated

## 2019-12-29 NOTE — H&P
[address] [facility]  H&PFlavia Re 1940, [de-identified] y o  male MRN: [id number]    Unit/Bed#: E4 -01 Encounter: [id number]    Primary Care Provider: Meng Walsh PA-C   Date and time admitted to hospital: 12/29/2019 10:55 AM        * Chest pain  Assessment & Plan  · For the last few weeks, brief episodes of left sided chest pain, not associated with activity  · Cardiac history of CAD, s/p CABG x 3 (2016), ischemic cardiomyopathy s/p ICD and EF 29%, PAF on Coumadin  · Follows with Cardiology through [facility], last echo 2/2019  · Initial troponin normal, perform serial troponins / EKG's  · Monitor on telemetry  · Consult to Cardiology    Coronary artery disease  Assessment & Plan  · Status post CABG x3 in 2016  · Maintained on statin / metoprolol / Isordil  · Consultation to Cardiology    Generalized abdominal pain  Assessment & Plan  · Nonspecific  · CT A/P unremarkable  · Continue PPI therapy per home regimen  · Monitor for symptoms    Acute renal failure superimposed on stage 4 chronic kidney disease ([facility] [address] )  Assessment & Plan  · Follows with Nephrology through [facility]  · Baseline creatinine 2 7-3 2  · Previously on dialysis, does not want to be on dialysis again  · Given IV fluid bolus of 500 ml in ER  · Avoid nephrotoxins / hypotension  · Diuretic on hold  · Monitor BMP  · Assess UA / PVR  Lab Results   Component Value Date    CREATININE 4 28 (H) 12/29/2019    CREATININE 3 71 (H) 12/03/2019         Paroxysmal atrial fibrillation (HCC)  Assessment & Plan  · Maintained on Coumadin and Metoprolol  Rate controlled    · Monitor on telemetry  · Monitor daily INR    Chronic systolic congestive heart failure (HCC)  Assessment & Plan  Wt Readings from Last 3 Encounters:   12/29/19 70 7 kg (155 lb 13 8 oz)   09/23/19 77 1 kg (170 lb)   08/20/19 73 2 kg (161 lb 6 4 oz)     · Ischemic cardiomyopathy, last echo 2/2019 with EF 29%, s/p ICD   · On Lasix 40 mg BID, hold with SHAHIDA  · BNP pending   · Patient's daughter reports that he drinks a lot of soda  · CT chest with chronic loculated pleural effusion  · + dyspnea  · Appears euvolemic  · Cardiology consult        Ambulatory dysfunction  Assessment & Plan  · Patient resides with daughter who reports frequent falls recently  His daughter attributes this to dragging his right foot due to right leg pain secondary to vascular disease    · Ambulates with a walker  · PT/OT evaluation    Pulmonary nodule  Assessment & Plan  · CT chest with focal opacity the along the left major fissure not seen previously, possibilities include atelectasis, pneumonia, neoplasm  · Also with 4 mm right apical lung nodule, stable and 3 mm left upper lobe nodule, new  · Recommend follow-up chest CT  · Incidental finding note generated    Macular atrophy, retinal  Assessment & Plan  · Minimal sight in right eye, impaired vision in left  · Following as an outpatient    Dementia with psychosis ([facility] )  Assessment & Plan  · Component of vascular dementia  · Follows with Neurology  · AAO x 3    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)  Assessment & Plan  · Monitor CBC  Lab Results   Component Value Date    HGB 11 1 (L) 12/29/2019    HGB 10 1 (L) 12/03/2019         PAD [facility]peripheral artery disease) ([facility] )  Assessment & Plan  · S/p angioplasty in right leg  · Followed by Vascular through [facility]  · Reports chronic pain in right leg with foot drop    BPH (benign prostatic hyperplasia)  Assessment & Plan  · Maintained on Flomax, continue  · Assess PVR    HTN (hypertension)  Assessment & Plan  · Maintained on Metoprolol, Hydralazine, Imdur  · Continue while here  · Monitor VS    GERD (gastroesophageal reflux disease)  Assessment & Plan  · Maintained on PPI  · Continue          VTE Prophylaxis: Warfarin (Coumadin)  / sequential compression device   Code Status:  Full code  POLST: POLST is not applicable to this patient  Discussion with family:  Plan of care with patient and daughter at bedside    Anticipated Length of Stay:  Patient will be admitted on an Inpatient basis with an anticipated length of stay of  more than 2 midnights  Justification for Hospital Stay:  Rule out ACS/monitoring of renal function    Total Time for Visit, including Counseling / Coordination of Care: 1 hour  Greater than 50% of this total time spent on direct patient counseling and coordination of care  Chief Complaint:   Chest pain    History of Present Illness:    Sven Elizabeth is a [de-identified] y o  male who presents with chest pain  Patient reports that he has been having brief episodes of left-sided chest pain over the last few weeks  It is not associated with exertion  He also reports some abdominal pain which he describes as seeping  He has been tolerating oral intake  Patient reports shortness of breath at times with exertion as well  The patient does not like to come to the hospital in the daughter felt he should come today as she found him to look pale and she was concerned  She also reports that he has been falling a lot  The patient has an extensive cardiac history to include coronary artery disease status post CABG x3 vessels in 2016, paroxysmal atrial fibrillation, ischemic cardiomyopathy status post ICD implantation; hypertension, hyperlipidemia, benign prostatic hypertrophy, chronic kidney disease stage 4, dementia, gastroesophageal reflux disease  Patient recently relocated here from [place] in March of this year and he is residing with his daughter  He is established care in the area with Cardiology, Nephrology, Neurology, Urology, primary care, and vascular surgery  The patient was evaluated in the emergency room and had initial negative troponin  His rate is controlled on telemetry  His creatinine is elevated beyond his baseline in the acute kidney injury range    CT imaging was performed of his chest and abdomen which demonstrated opacity in the left major fissure, a stable pulmonary nodule, and a new pulmonary nodule, there were no acute findings in the abdomen  The patient will be admitted and monitored on telemetry  Upon admission, consultations have been requested with Cardiology and Nephrology  His home medications have been reordered as indicated  We will withhold his diuretic therapy at this time due to his acute kidney injury  Will closely monitor his renal function  Serial troponin/EKGs have been ordered  We will wait further recommendations from Cardiology and Nephrology for further plan of care  Because of his recent frequent falls, evaluations have been requested with PT and OT  Review of Systems:    Review of Systems   Constitutional: Positive for activity change and fatigue  Negative for appetite change, chills and fever  HENT: Negative for congestion and sore throat  Eyes: Positive for visual disturbance  Respiratory: Positive for shortness of breath  Negative for cough  Cardiovascular: Positive for chest pain  Negative for palpitations  Gastrointestinal: Positive for abdominal distention and abdominal pain  Negative for constipation, diarrhea, nausea and vomiting  Genitourinary: Negative for difficulty urinating, dysuria, frequency and urgency  Musculoskeletal: Positive for arthralgias (Right leg pain)  Negative for joint swelling  Skin: Negative for rash  Neurological: Negative for dizziness, seizures, syncope, weakness, numbness and headaches  All other systems reviewed and are negative        Past Medical and Surgical History:     Past Medical History:   Diagnosis Date    [facility]     [facility]) [facility])     CKD (chronic kidney disease) stage 4, GFR 15-29 ml/min ([facility])     GERD (gastroesophageal reflux disease)     Glaucoma     Hypertension     Renal disorder        Past Surgical History:   Procedure Laterality Date    ABDOMINAL SURGERY      CARDIAC PACEMAKER PLACEMENT         Meds/Allergies:    Prior to Admission medications    Medication Sig Start Date End Date Taking? Authorizing Provider   atorvastatin (LIPITOR) 80 mg tablet Take 80 mg by mouth daily 3/19/19  Yes Historical Provider, MD   furosemide (LASIX) 40 mg tablet Take 40 mg by mouth 2 (two) times a day 3/19/19  Yes Historical Provider, MD   gabapentin (NEURONTIN) 300 mg capsule Take 300 mg by mouth daily 3/19/19  Yes Historical Provider, MD   hydrALAZINE (APRESOLINE) 25 mg tablet Take 50 mg by mouth Three times a day 4/25/19  Yes Historical Provider, MD   isosorbide dinitrate (ISORDIL) 20 mg tablet Take 20 mg by mouth Three times a day 6/13/19 12/29/19 Yes Historical Provider, MD   metoprolol succinate (TOPROL-XL) 25 mg 24 hr tablet Take 25 mg by mouth daily 3/19/19  Yes Historical Provider, MD   pantoprazole (PROTONIX) 40 mg tablet Take 40 mg by mouth daily 3/19/19  Yes Historical Provider, MD   potassium chloride (K-DUR,KLOR-CON) 10 mEq tablet Take 10 mEq by mouth daily   Yes Historical Provider, MD   tamsulosin (FLOMAX) 0 4 mg Take 0 8 mg by mouth 5/17/19 5/16/20 Yes Historical Provider, MD   warfarin (COUMADIN) 3 mg tablet Take 3 mg by mouth daily   Yes Historical Provider, MD   diphenhydrAMINE (BENADRYL) 50 MG tablet Take 25 mg by mouth every 6 (six) hours as needed for itching     Historical Provider, MD   hydrocortisone 1 % cream Apply topically 4 (four) times a day as needed for irritation for up to 5 days (DO NOT USE FOR MORE THAN 5 DAYS) 7/21/19 8/20/19  Wiley Wilson DO   predniSONE 10 mg tablet Days 1-2: 3 tablets daily; Days 3-4: 2 tablets daily; Days 5-6: 1 tablets daily then stop 7/21/19 12/29/19  Wiley Wilson DO     I have reviewed home medications with patient personally      Allergies: No Known Allergies    Social History:     Marital Status: Single   Occupation:  Retired  Patient Pre-hospital Living Situation:  With daughter  Patient Pre-hospital Level of Mobility:  Uses walker  Patient Pre-hospital Diet Restrictions:  None  Substance Use History:   Social History Substance and Sexual Activity   Alcohol Use Never    Frequency: Never    Drinks per session: Patient refused    Binge frequency: Patient refused     Social History     Tobacco Use   Smoking Status Former Smoker    Packs/day: 2 00    Years: 30 00    Pack years: 60 00    Last attempt to quit: Ismael Peoples Years since quittin 0   Smokeless Tobacco Never Used   Tobacco Comment    quit 40 years ago     Social History     Substance and Sexual Activity   Drug Use Never       Family History:    non-contributory    Physical Exam:     Vitals:   Blood Pressure: 145/74 (19 1410)  Pulse: 69 (19 1410)  Temperature: 98 1 °F (36 7 °C) (19 1410)  Temp Source: Tympanic (19 1410)  Respirations: 18 (19 1410)  Height: 5' 6" (167 6 cm) (19 1410)  Weight - Scale: 70 5 kg (155 lb 6 8 oz) (19 1410)  SpO2: 95 % (19 1410)    Physical Exam   Constitutional: He is oriented to person, place, and time  He appears well-developed and well-nourished  No distress  HENT:   Head: Normocephalic and atraumatic  Eyes: Conjunctivae are normal  No scleral icterus  Cardiovascular: Normal rate, regular rhythm and normal heart sounds  No murmur heard  Pulmonary/Chest: Effort normal and breath sounds normal  No respiratory distress  He has no wheezes  He has no rales  On room air   Abdominal: Soft  Bowel sounds are normal  He exhibits no distension  There is no tenderness  Musculoskeletal: Normal range of motion  He exhibits no edema or tenderness  Neurological: He is alert and oriented to person, place, and time  Skin: Skin is warm and dry  He is not diaphoretic  Psychiatric: He has a normal mood and affect  His behavior is normal    Nursing note and vitals reviewed  Additional Data:     Lab Results: I have personally reviewed pertinent reports        Results from last 7 days   Lab Units 19  1110   WBC Thousand/uL 5 39   HEMOGLOBIN g/dL 11 1*   HEMATOCRIT % 36 4* PLATELETS Thousands/uL 187   NEUTROS PCT % 67   LYMPHS PCT % 20   MONOS PCT % 8   EOS PCT % 5     Results from last 7 days   Lab Units 12/29/19  1110   SODIUM mmol/L 137   POTASSIUM mmol/L 4 4   CHLORIDE mmol/L 106   CO2 mmol/L 20*   BUN mg/dL 67*   CREATININE mg/dL 4 28*   ANION GAP mmol/L 11   CALCIUM mg/dL 9 4   ALBUMIN g/dL 3 6   TOTAL BILIRUBIN mg/dL 0 56   ALK PHOS U/L 92   ALT U/L 15   AST U/L 31   GLUCOSE RANDOM mg/dL 99     Results from last 7 days   Lab Units 12/29/19  1205   INR  2 25*             Results from last 7 days   Lab Units 12/29/19  1133   LACTIC ACID mmol/L 0 9       Imaging: I have personally reviewed pertinent reports  CT head without contrast   Final Result by Alonso Rinne, MD (12/29 1206)      No acute intracranial abnormality  Microangiopathic changes  Workstation performed: [id number]         CT chest abdomen pelvis wo contrast   Final Result by Alonso Rinne, MD (12/29 1226)      1  Partially loculated left pleural effusion, described on the previous study and likely chronic  Overlying opacity was also described previously and most consistent with atelectasis or scarring  2   Focal opacity along the left major fissure, not specifically described on the previous study though chronicity is uncertain  Possibilities include additional atelectasis, pneumonia, and neoplasm  The prior CT will be obtained for comparison and an    addendum will be issued  3   4 mm right apical lung nodule, presumably stable from the prior CT  A 3 mm left upper lobe nodule was not described previously  Based on current Fleischner Society 2017 Guidelines on incidental pulmonary nodule, no routine follow-up is needed if the    patient is considered low risk for lung cancer  If the patient is considered high risk for lung cancer, 12 month follow-up non-contrast chest CT is recommended  4   No acute abnormality in the abdomen or pelvis        5   4 cm infrarenal abdominal aortic aneurysm, unchanged  The study was marked in [facility] for immediate notification  Workstation performed: [id number]         XR chest 2 views   ED Interpretation by Lady Tran DO (12/29 1027)   Abnormal   Left sided effusion versus atelectasis versus bleb as interpreted by me independently           EKG, Pathology, and Other Studies Reviewed on Admission:   · EKG:  Not available    Allscripts / Epic Records Reviewed: Yes     ** Please Note: This note has been constructed using a voice recognition system   **

## 2019-12-29 NOTE — INCIDENTAL FINDINGS
The following findings require follow up:  Radiographic finding   Finding: New pulmonary nodule, 3 mm, ELLA; stable 4 mm right apical; focal opacity             left major fissure   Follow up required: Repeat chest CT   Follow up should be done within 12 month(s)    Please notify the following clinician to assist with the follow up:   Remedios Pantoja PA-C

## 2019-12-29 NOTE — ASSESSMENT & PLAN NOTE
· Status post CABG x3 in 2016  · Maintained on statin / metoprolol / Isordil  · Consultation to Cardiology

## 2019-12-30 LAB
ANION GAP SERPL CALCULATED.3IONS-SCNC: 13 MMOL/L (ref 4–13)
BACTERIA UR QL AUTO: ABNORMAL /HPF
BILIRUB UR QL STRIP: NEGATIVE
BUN SERPL-MCNC: 67 MG/DL (ref 5–25)
CALCIUM SERPL-MCNC: 8.9 MG/DL (ref 8.3–10.1)
CHLORIDE SERPL-SCNC: 106 MMOL/L (ref 100–108)
CLARITY UR: CLEAR
CO2 SERPL-SCNC: 20 MMOL/L (ref 21–32)
COLOR UR: ABNORMAL
CREAT SERPL-MCNC: 3.65 MG/DL (ref 0.6–1.3)
ERYTHROCYTE [DISTWIDTH] IN BLOOD BY AUTOMATED COUNT: 13.2 % (ref 11.6–15.1)
GFR SERPL CREATININE-BSD FRML MDRD: 15 ML/MIN/1.73SQ M
GLUCOSE SERPL-MCNC: 96 MG/DL (ref 65–140)
GLUCOSE UR STRIP-MCNC: NEGATIVE MG/DL
HCT VFR BLD AUTO: 30.1 % (ref 36.5–49.3)
HGB BLD-MCNC: 9.6 G/DL (ref 12–17)
HGB UR QL STRIP.AUTO: NEGATIVE
INR PPP: 2.54 (ref 0.84–1.19)
KETONES UR STRIP-MCNC: NEGATIVE MG/DL
LEUKOCYTE ESTERASE UR QL STRIP: NEGATIVE
MCH RBC QN AUTO: 32.2 PG (ref 26.8–34.3)
MCHC RBC AUTO-ENTMCNC: 31.9 G/DL (ref 31.4–37.4)
MCV RBC AUTO: 101 FL (ref 82–98)
NITRITE UR QL STRIP: NEGATIVE
NON-SQ EPI CELLS URNS QL MICRO: ABNORMAL /HPF
PH UR STRIP.AUTO: 5.5 [PH]
PLATELET # BLD AUTO: 160 THOUSANDS/UL (ref 149–390)
PMV BLD AUTO: 9.7 FL (ref 8.9–12.7)
POTASSIUM SERPL-SCNC: 3.4 MMOL/L (ref 3.5–5.3)
PROT UR STRIP-MCNC: NEGATIVE MG/DL
PROTHROMBIN TIME: 27.9 SECONDS (ref 11.6–14.5)
RBC # BLD AUTO: 2.98 MILLION/UL (ref 3.88–5.62)
RBC #/AREA URNS AUTO: ABNORMAL /HPF
SODIUM SERPL-SCNC: 139 MMOL/L (ref 136–145)
SP GR UR STRIP.AUTO: 1.01 (ref 1–1.03)
UROBILINOGEN UR QL STRIP.AUTO: 0.2 E.U./DL
WBC # BLD AUTO: 3.94 THOUSAND/UL (ref 4.31–10.16)
WBC #/AREA URNS AUTO: ABNORMAL /HPF

## 2019-12-30 PROCEDURE — 99222 1ST HOSP IP/OBS MODERATE 55: CPT | Performed by: INTERNAL MEDICINE

## 2019-12-30 PROCEDURE — 85610 PROTHROMBIN TIME: CPT | Performed by: STUDENT IN AN ORGANIZED HEALTH CARE EDUCATION/TRAINING PROGRAM

## 2019-12-30 PROCEDURE — G8988 SELF CARE GOAL STATUS: HCPCS

## 2019-12-30 PROCEDURE — G8987 SELF CARE CURRENT STATUS: HCPCS

## 2019-12-30 PROCEDURE — 97167 OT EVAL HIGH COMPLEX 60 MIN: CPT

## 2019-12-30 PROCEDURE — 85027 COMPLETE CBC AUTOMATED: CPT | Performed by: NURSE PRACTITIONER

## 2019-12-30 PROCEDURE — G8978 MOBILITY CURRENT STATUS: HCPCS

## 2019-12-30 PROCEDURE — G8979 MOBILITY GOAL STATUS: HCPCS

## 2019-12-30 PROCEDURE — 97163 PT EVAL HIGH COMPLEX 45 MIN: CPT

## 2019-12-30 PROCEDURE — 80048 BASIC METABOLIC PNL TOTAL CA: CPT | Performed by: NURSE PRACTITIONER

## 2019-12-30 PROCEDURE — 99232 SBSQ HOSP IP/OBS MODERATE 35: CPT | Performed by: STUDENT IN AN ORGANIZED HEALTH CARE EDUCATION/TRAINING PROGRAM

## 2019-12-30 PROCEDURE — 99232 SBSQ HOSP IP/OBS MODERATE 35: CPT | Performed by: INTERNAL MEDICINE

## 2019-12-30 RX ORDER — SODIUM BICARBONATE 650 MG/1
650 TABLET ORAL
Status: DISCONTINUED | OUTPATIENT
Start: 2019-12-30 | End: 2019-12-31

## 2019-12-30 RX ADMIN — PANTOPRAZOLE SODIUM 40 MG: 40 TABLET, DELAYED RELEASE ORAL at 06:14

## 2019-12-30 RX ADMIN — GABAPENTIN 300 MG: 300 CAPSULE ORAL at 08:51

## 2019-12-30 RX ADMIN — ISOSORBIDE DINITRATE 20 MG: 10 TABLET ORAL at 08:51

## 2019-12-30 RX ADMIN — TAMSULOSIN HYDROCHLORIDE 0.8 MG: 0.4 CAPSULE ORAL at 17:22

## 2019-12-30 RX ADMIN — WARFARIN SODIUM 3 MG: 3 TABLET ORAL at 17:22

## 2019-12-30 RX ADMIN — SODIUM BICARBONATE 650 MG TABLET 650 MG: at 17:22

## 2019-12-30 RX ADMIN — ATORVASTATIN CALCIUM 80 MG: 80 TABLET, FILM COATED ORAL at 17:21

## 2019-12-30 RX ADMIN — METOPROLOL SUCCINATE 25 MG: 25 TABLET, EXTENDED RELEASE ORAL at 08:51

## 2019-12-30 RX ADMIN — SODIUM BICARBONATE 650 MG TABLET 650 MG: at 12:10

## 2019-12-30 NOTE — PLAN OF CARE
Problem: Potential for Falls  Goal: Patient will remain free of falls  Description  INTERVENTIONS:  - Assess patient frequently for physical needs  -  Identify cognitive and physical deficits and behaviors that affect risk of falls    -  [place] [facility] fall precautions as indicated by assessment   - Educate patient/family on patient safety including physical limitations  - Instruct patient to call for assistance with activity based on assessment  - Modify environment to reduce risk of injury  - Consider OT/PT consult to assist with strengthening/mobility  Outcome: Progressing     Problem: Prexisting or High Potential for Compromised Skin Integrity  Goal: Skin integrity is maintained or improved  Description  INTERVENTIONS:  - Identify patients at risk for skin breakdown  - Assess and monitor skin integrity  - Assess and monitor nutrition and hydration status  - Monitor labs   - Assess for incontinence   - Turn and reposition patient  - Assist with mobility/ambulation  - Relieve pressure over bony prominences  - Avoid friction and shearing  - Provide appropriate hygiene as needed including keeping skin clean and dry  - Evaluate need for skin moisturizer/barrier cream  - Collaborate with interdisciplinary team   - Patient/family teaching  - Consider wound care consult   Outcome: Progressing     Problem: PAIN - ADULT  Goal: Verbalizes/displays adequate comfort level or baseline comfort level  Description  Interventions:  - Encourage patient to monitor pain and request assistance  - Assess pain using appropriate pain scale  - Administer analgesics based on type and severity of pain and evaluate response  - Implement non-pharmacological measures as appropriate and evaluate response  - Consider cultural and social influences on pain and pain management  - Notify physician/advanced practitioner if interventions unsuccessful or patient reports new pain  Outcome: Progressing     Problem: DISCHARGE PLANNING  Goal: Discharge to home or other facility with appropriate resources  Description  INTERVENTIONS:  - Identify barriers to discharge w/patient and caregiver  - Arrange for needed discharge resources and transportation as appropriate  - Identify discharge learning needs (meds, wound care, etc )  - Arrange for interpretive services to assist at discharge as needed  - Refer to Case Management Department for coordinating discharge planning if the patient needs post-hospital services based on physician/advanced practitioner order or complex needs related to functional status, cognitive ability, or social support system  Outcome: Progressing     Problem: Knowledge Deficit  Goal: Patient/family/caregiver demonstrates understanding of disease process, treatment plan, medications, and discharge instructions  Description  Complete learning assessment and assess knowledge base    Interventions:  - Provide teaching at level of understanding  - Provide teaching via preferred learning methods  Outcome: Progressing     Problem: CARDIOVASCULAR - ADULT  Goal: Maintains optimal cardiac output and hemodynamic stability  Description  INTERVENTIONS:  - Monitor I/O, vital signs and rhythm  - Monitor for S/S and trends of decreased cardiac output  - Administer and titrate ordered vasoactive medications to optimize hemodynamic stability  - Assess quality of pulses, skin color and temperature  - Assess for signs of decreased coronary artery perfusion  - Instruct patient to report change in severity of symptoms  Outcome: Progressing  Goal: Absence of cardiac dysrhythmias or at baseline rhythm  Description  INTERVENTIONS:  - Continuous cardiac monitoring, vital signs, obtain 12 lead EKG if ordered  - Administer antiarrhythmic and heart rate control medications as ordered  - Monitor electrolytes and administer replacement therapy as ordered  Outcome: Progressing     Problem: GENITOURINARY - ADULT  Goal: Maintains or returns to baseline urinary function  Description  INTERVENTIONS:  - Assess urinary function  - Encourage oral fluids to ensure adequate hydration if ordered  - Administer IV fluids as ordered to ensure adequate hydration  - Administer ordered medications as needed  - Offer frequent toileting  - Follow urinary retention protocol if ordered  Outcome: Progressing  Goal: Absence of urinary retention  Description  INTERVENTIONS:  - Assess patients ability to void and empty bladder  - Monitor I/O  - Bladder scan as needed  - Discuss with physician/AP medications to alleviate retention as needed  - Discuss catheterization for long term situations as appropriate  Outcome: Progressing     Problem: METABOLIC, FLUID AND ELECTROLYTES - ADULT  Goal: Electrolytes maintained within normal limits  Description  INTERVENTIONS:  - Monitor labs and assess patient for signs and symptoms of electrolyte imbalances  - Administer electrolyte replacement as ordered  - Monitor response to electrolyte replacements, including repeat lab results as appropriate  - Instruct patient on fluid and nutrition as appropriate  Outcome: Progressing  Goal: Fluid balance maintained  Description  INTERVENTIONS:  - Monitor labs   - Monitor I/O and WT  - Instruct patient on fluid and nutrition as appropriate  - Assess for signs & symptoms of volume excess or deficit  Outcome: Progressing

## 2019-12-30 NOTE — ASSESSMENT & PLAN NOTE
Wt Readings from Last 3 Encounters:   12/29/19 70 5 kg (155 lb 6 8 oz)   09/23/19 77 1 kg (170 lb)   08/20/19 73 2 kg (161 lb 6 4 oz)     Ischemic cardiomyopathy, last echo 2/2019 with EF 29%, s/p ICD   - Diuretics held in the setting of SHAHIDA  No signs or symptoms of fluid overload    - Appreciate cardiology recommendations

## 2019-12-30 NOTE — OCCUPATIONAL THERAPY NOTE
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)    Dementia with psychosis ([facility] )    Macular atrophy, retinal    Generalized abdominal pain    Pulmonary nodule    Ambulatory dysfunction    Coronary artery disease    Past Medical History  Past Medical History:   Diagnosis Date    A-fib ([facility] )     CHF (congestive heart failure) (HCC)     CKD (chronic kidney disease) stage 4, GFR 15-29 ml/min (HCC)     GERD (gastroesophageal reflux disease)     Glaucoma     Hypertension     Renal disorder      Past Surgical History  Past Surgical History:   Procedure Laterality Date    ABDOMINAL SURGERY      CARDIAC PACEMAKER PLACEMENT             12/30/19 0827   Note Type   Note type Eval/Treat   Restrictions/Precautions   Weight Bearing Precautions Per Order No   Other Precautions Cognitive; Fall Risk;Pain;Visual impairment   Pain Assessment   Pain Assessment FLACC   Pain Rating: FLACC (Rest) - Face 1   Pain Rating: FLACC (Rest) - Legs 0   Pain Rating: FLACC (Rest) - Activity 0   Pain Rating: FLACC (Rest) - Cry 0   Pain Rating: FLACC (Rest) - Consolability 0   Score: FLACC (Rest) 1   Pain Rating: FLACC (Activity) - Face 1   Pain Rating: FLACC (Activity) - Legs 0   Pain Rating: FLACC (Activity) - Activity 1   Pain Rating: FLACC (Activity) - Cry 0   Pain Rating: FLACC (Activity) - Consolability 0   Score: FLACC (Activity) 2   Home Living   Type of Home House   Home Layout Two level; Able to live on main level with bedroom/bathroom  (1 SHERI)   Bathroom Shower/Tub Tub/shower unit   Bathroom Toilet Standard   Bathroom Equipment Grab bars in shower   Home Equipment Walker;Grab bars  (rollator)   Additional Comments pt reports living on first floor set up, pt with cognitive deficits, unsure of accuracy of information    Prior Function   Level of [place] Independent with ADLs and functional mobility; Needs assistance with IADLs   Lives With Daughter   Receives Help From Family   ADL Assistance Independent  (A with showers )   IADLs Needs assistance   Falls in the last 6 months 0   Vocational Retired   Lifestyle   Autonomy PTA pt I with ADLs except for showering and shower transfers   Reciprocal Relationships daughter   Service to Others retired    Intrinsic Gratification "I don't do a whole lot"   Psychosocial   Psychosocial (WDL) [address] "I can't see you all too well ma'am"   ADL   Where Assessed Edge of bed   Eating Assistance 5  Supervision/Setup   Grooming Assistance 5  Supervision/Setup   UB Bathing Assistance 4  Minimal Assistance   LB [address] 3  Moderate Assistance   [address] 4  [address] 3  Moderate [address]  3  Moderate Assistance   Bed Mobility   Supine to Sit 5  Supervision   Additional items HOB elevated; Increased time required;Verbal cues   Sit to Supine 5  Supervision   Additional items HOB elevated; Bedrails; Increased time required;Verbal cues   Transfers   Sit to Stand 4  Minimal assistance   Additional items Assist x 1; Increased time required;Verbal cues; Bedrails   Stand to Sit 4  Minimal assistance   Additional items Assist x 1; Increased time required;Verbal cues; Bedrails   Stand pivot 4  Minimal assistance   Additional items Assist x 1; Increased time required; Bedrails   Additional Comments use of RW   Functional Mobility   Functional Mobility 4  Minimal assistance   Additional Comments x1; ~25'   Additional items Rolling walker   Balance   Static Sitting Fair +   Dynamic Sitting Fair   Static Standing Fair -   Dynamic Standing Poor +   Ambulatory Poor   Activity Tolerance   Activity Tolerance Patient limited by fatigue   Medical Staff Made Aware JUDITH Rosario   RUE Assessment   RUE Assessment WFL  (4-/5 strength throughout)   LUE Assessment   LUE Assessment WFL  (4-/5 strength throughout)   Hand Function   Gross Motor Coordination Functional   Fine Motor Coordination Impaired   Cognition   Overall Cognitive Status Impaired   Arousal/Participation Alert; Cooperative   Attention Attends with cues to redirect   Orientation Level Oriented to person;Oriented to place;Oriented to situation;Disoriented to time   Memory Decreased long term memory;Decreased recall of biographical information;Decreased short term memory   Following Commands Follows one step commands with increased time or repetition   Assessment   Limitation Decreased ADL status; Decreased UE strength;Decreased Safe judgement during ADL;Decreased cognition;Decreased endurance;Decreased self-care trans;Decreased high-level ADLs; Visual deficit   Prognosis Fair;Good   Assessment Patient is a [de-identified] y o  y/o male admitted to [facility] on 12/29/2019 due to Chest pain  Comorbidities affecting pt's physical performance at time of assessment include CKD, HTN and abdominal pain, dementia, CAD, GERD  Patient has active OT orders and activity orders for Up with assistance  Prior to admission pt was living with daughter in a single floor set up  Patient independent w/ all ADLs, and functional mobility, requiring A for IADL and bathing tasks tasks and used rollator for mobility  At the time of evaluation patient currently requires min-mod A for overall ADLs, and min A for functional mobility   The following deficits affected patient's occupational performance weakness, decreased functional strength, decreased functional balance, decreased activity tolerance, decreased safety awareness, impaired attention, impaired memory, impaired problem solving and impaired vision  Patient would benefit from skilled OT services while in the hospital to address above deficits  Occupational performance areas to be addressed include ADL retraining, bed mobility, functional transfer training, endurance training, patient/family training, equipment evaluation/education, compensatory technique education, activity engagement and activity tolerance in order to maximize patient's level of function  From OT standpoint recommend Short term rehab upon D/C  OT continue to follow pt 3-5x/week to address the following goals  Goals   Short Term Goal #1  Patient will follow multistep instructions with no cuing to increase cognitive function and independence with tasks   Short Term Goal #2 Patient will increase BUE strength by 1MM grade via AROM/AAROM/PROM exercises to increase independence in ADLs and transfers   Long Term Goal #1 Patient will complete UB/LB ADLs w/ S using AE and AD as needed   Long Term Goal #2 Patient will complete toileting w/ S w/ G hygiene/thoroughness   Long Term Goal  Patient will perform functional transfers with S to/from all surfaces using DME as needed   Plan   Treatment Interventions ADL retraining;Functional transfer training;UE strengthening/ROM; Endurance training;Cognitive reorientation;Patient/family training;Equipment evaluation/education; Compensatory technique education; Energy conservation; Activityengagement   Goal Expiration Date 01/09/20   OT Treatment Day 0   OT Frequency 3-5x/wk   Recommendation   OT Discharge Recommendation Short Term Rehab   OT - OK to Discharge   (to rehab when medically stable)   Barthel Index   Feeding 10   Bathing 0   Grooming Score 5   Dressing Score 5   Bladder Score 10   Bowels Score 10   Toilet Use Score 5   Transfers (Bed/Chair) Score 10   Mobility (Level Surface) Score 0   Stairs Score 0   Barthel Index Score 55     At the end of the session pt supine in bed, alarm on, B SCDs on and call bell within reach, all needs met, continue to follow       [facility], OTR/L

## 2019-12-30 NOTE — ASSESSMENT & PLAN NOTE
Atypical chest pain  Appreciate cardiology recommendations  No need for inpatient workup   Outpatient followup with cardiologist

## 2019-12-30 NOTE — PROGRESS NOTES
Progress Note Ganesh Vargas 1940, [de-identified] y o  male MRN: [id number]    Unit/Bed#: E4 -01 Encounter: [id number]    Primary Care Provider: Rafael Sánchez PA-C   Date and time admitted to hospital: 12/29/2019 10:55 AM        * Acute renal failure superimposed on stage 4 chronic kidney disease ([address] )  Assessment & Plan  48-year-old male with a past medical history of CAD status post CABG, ischemic cardiomyopathy status post ICD (EF 29%), PAF on Coumadin, admitted due to intermittent chest pain over the past week in the setting acute renal failure superimposed stage IV CKD  - serial troponins negative  No need for inpatient workup for intermittent chest pain as per Cardiology  - renal function improved  - discussed case with Renal, they recommended keeping him for 1 more night in anticipation for restarting his diuretics tomorrow  Chronic systolic congestive heart failure (HCC)  Assessment & Plan  Wt Readings from Last 3 Encounters:   12/29/19 70 5 kg (155 lb 6 8 oz)   09/23/19 77 1 kg (170 lb)   08/20/19 73 2 kg (161 lb 6 4 oz)     Ischemic cardiomyopathy, last echo 2/2019 with EF 29%, s/p ICD   - Diuretics held in the setting of SHAHIDA  No signs or symptoms of fluid overload  - Appreciate cardiology recommendations      Chest pain  Assessment & Plan  Atypical chest pain  Appreciate cardiology recommendations  No need for inpatient workup  Outpatient followup with cardiologist     Paroxysmal atrial fibrillation [facility])  Assessment & Plan  Continue monitoring INR  Therapeutic  Continue rate control agents    Recent Labs     12/29/19  1205 12/30/19  0554   INR 2 25* 2 54*           VTE Pharmacologic Prophylaxis:   Pharmacologic: Warfarin (Coumadin)  Mechanical VTE Prophylaxis in Place: Yes    Patient Centered Rounds: I have performed bedside rounds with nursing staff today      Discussions with Specialists or Other Care Team Provider: Renal    Education and Discussions with Family / Patient: Patient    Time Spent for Care: 30 minutes  More than 50% of total time spent on counseling and coordination of care as described above  Current Length of Stay: 1 day(s)    Current Patient Status: Inpatient   Certification Statement: The patient will continue to require additional inpatient hospital stay due to Renal function monitoring for diuretic reinitiation    Discharge Plan: Home in 24 hours    Code Status: Level 1 - Full Code      Subjective:   Patient seen and examined at bedside  No acute events or complaints overnight  Discussed case with Renal   They recommended holding off discharge for 24 more hours in anticipation for re-initiation of his diuretics  Objective:     Vitals:   Temp (24hrs), Av 7 °F (36 5 °C), Min:97 °F (36 1 °C), Max:98 6 °F (37 °C)    Temp:  [97 °F (36 1 °C)-98 6 °F (37 °C)] 97 5 °F (36 4 °C)  HR:  [56-66] 66  Resp:  [16-18] 18  BP: ()/(50-57) 104/56  SpO2:  [90 %-98 %] 96 %  Body mass index is 25 09 kg/m²  Input and Output Summary (last 24 hours): Intake/Output Summary (Last 24 hours) at 2019 1813  Last data filed at 2019 1033  Gross per 24 hour   Intake 1141 ml   Output 975 ml   Net 166 ml       Physical Exam:     Physical Exam   Constitutional: No distress  Elderly male   HENT:   Head: Normocephalic and atraumatic  Eyes: Pupils are equal, round, and reactive to light  EOM are normal    Neck: Neck supple  Cardiovascular: Normal rate and regular rhythm  Pulmonary/Chest: Effort normal and breath sounds normal  He has no wheezes  He has no rales  Abdominal: Soft  Bowel sounds are normal  There is no tenderness  There is no guarding  Musculoskeletal: He exhibits no edema  Neurological: He is alert  Skin: Skin is warm and dry  Psychiatric: He has a normal mood and affect       Additional Data:     Labs:    Results from last 7 days   Lab Units 19  0554 19  1110   WBC Thousand/uL 3 94* 5 39   HEMOGLOBIN g/dL 9 6* 11 1*   HEMATOCRIT % 30 1* 36 4*   PLATELETS Thousands/uL 160 187   NEUTROS PCT %  --  67   LYMPHS PCT %  --  20   MONOS PCT %  --  8   EOS PCT %  --  5     Results from last 7 days   Lab Units 12/30/19  0554 12/29/19  1110   SODIUM mmol/L 139 137   POTASSIUM mmol/L 3 4* 4 4   CHLORIDE mmol/L 106 106   CO2 mmol/L 20* 20*   BUN mg/dL 67* 67*   CREATININE mg/dL 3 65* 4 28*   ANION GAP mmol/L 13 11   CALCIUM mg/dL 8 9 9 4   ALBUMIN g/dL  --  3 6   TOTAL BILIRUBIN mg/dL  --  0 56   ALK PHOS U/L  --  92   ALT U/L  --  15   AST U/L  --  31   GLUCOSE RANDOM mg/dL 96 99     Results from last 7 days   Lab Units 12/30/19  0554   INR  2 54*             Results from last 7 days   Lab Units 12/29/19  1433 12/29/19  1133   LACTIC ACID mmol/L 0 7 0 9           * I Have Reviewed All Lab Data Listed Above  * Additional Pertinent Lab Tests Reviewed: [address] Admission Reviewed    Imaging:    Imaging Reports Reviewed Today Include: No new imaging    Recent Cultures (last 7 days):           Last 24 Hours Medication List:     Current Facility-Administered Medications:  acetaminophen 650 mg Oral Q6H PRN AUDREY Macario   atorvastatin 80 mg Oral After Dinner Emelina Shaikh, KEONNP   gabapentin 300 mg Oral Daily Emelina Shaikh, CRNP   hydrALAZINE 50 mg Oral [facility], CRNP   isosorbide dinitrate 20 mg Oral TID after meals Emelina Shaikh, AUDREY   metoprolol succinate 25 mg Oral Daily Emelina Shaikh, CRNP   pantoprazole 40 mg Oral Daily Emelina Shaikh, CRNP   sodium bicarbonate 650 mg Oral TID after meals Alessandro Rodriguez, CRNP   tamsulosin 0 8 mg Oral Daily With Dinner Emelina Shaikh, KEONNP   warfarin 3 mg Oral Daily (warfarin) AUDREY Macario        Today, Patient Was Seen By: Po Davies MD    ** Please Note: Dictation voice to text software may have been used in the creation of this document   **

## 2019-12-30 NOTE — PLAN OF CARE
Problem: Potential for Falls  Goal: Patient will remain free of falls  Description  INTERVENTIONS:  - Assess patient frequently for physical needs  -  Identify cognitive and physical deficits and behaviors that affect risk of falls    -  Orient fall precautions as indicated by assessment   - Educate patient/family on patient safety including physical limitations  - Instruct patient to call for assistance with activity based on assessment  - Modify environment to reduce risk of injury  - Consider OT/PT consult to assist with strengthening/mobility  Outcome: Progressing     Problem: Prexisting or High Potential for Compromised Skin Integrity  Goal: Skin integrity is maintained or improved  Description  INTERVENTIONS:  - Identify patients at risk for skin breakdown  - Assess and monitor skin integrity  - Assess and monitor nutrition and hydration status  - Monitor labs   - Assess for incontinence   - Turn and reposition patient  - Assist with mobility/ambulation  - Relieve pressure over bony prominences  - Avoid friction and shearing  - Provide appropriate hygiene as needed including keeping skin clean and dry  - Evaluate need for skin moisturizer/barrier cream  - Collaborate with interdisciplinary team   - Patient/family teaching  - Consider wound care consult   Outcome: Progressing     Problem: PAIN - ADULT  Goal: Verbalizes/displays adequate comfort level or baseline comfort level  Description  Interventions:  - Encourage patient to monitor pain and request assistance  - Assess pain using appropriate pain scale  - Administer analgesics based on type and severity of pain and evaluate response  - Implement non-pharmacological measures as appropriate and evaluate response  - Consider cultural and social influences on pain and pain management  - Notify physician/advanced practitioner if interventions unsuccessful or patient reports new pain  Outcome: Progressing     Problem: DISCHARGE PLANNING  Goal: Discharge to home or other facility with appropriate resources  Description  INTERVENTIONS:  - Identify barriers to discharge w/patient and caregiver  - Arrange for needed discharge resources and transportation as appropriate  - Identify discharge learning needs (meds, wound care, etc )  - Arrange for interpretive services to assist at discharge as needed  - Refer to Case Management Department for coordinating discharge planning if the patient needs post-hospital services based on physician/advanced practitioner order or complex needs related to functional status, cognitive ability, or social support system  Outcome: Progressing     Problem: Knowledge Deficit  Goal: Patient/family/caregiver demonstrates understanding of disease process, treatment plan, medications, and discharge instructions  Description  Complete learning assessment and assess knowledge base    Interventions:  - Provide teaching at level of understanding  - Provide teaching via preferred learning methods  Outcome: Progressing     Problem: CARDIOVASCULAR - ADULT  Goal: Maintains optimal cardiac output and hemodynamic stability  Description  INTERVENTIONS:  - Monitor I/O, vital signs and rhythm  - Monitor for S/S and trends of decreased cardiac output  - Administer and titrate ordered vasoactive medications to optimize hemodynamic stability  - Assess quality of pulses, skin color and temperature  - Assess for signs of decreased coronary artery perfusion  - Instruct patient to report change in severity of symptoms  Outcome: Progressing  Goal: Absence of cardiac dysrhythmias or at baseline rhythm  Description  INTERVENTIONS:  - Continuous cardiac monitoring, vital signs, obtain 12 lead EKG if ordered  - Administer antiarrhythmic and heart rate control medications as ordered  - Monitor electrolytes and administer replacement therapy as ordered  Outcome: Progressing     Problem: GENITOURINARY - ADULT  Goal: Maintains or returns to baseline urinary function  Description  INTERVENTIONS:  - Assess urinary function  - Encourage oral fluids to ensure adequate hydration if ordered  - Administer IV fluids as ordered to ensure adequate hydration  - Administer ordered medications as needed  - Offer frequent toileting  - Follow urinary retention protocol if ordered  Outcome: Progressing  Goal: Absence of urinary retention  Description  INTERVENTIONS:  - Assess patients ability to void and empty bladder  - Monitor I/O  - Bladder scan as needed  - Discuss with physician/AP medications to alleviate retention as needed  - Discuss catheterization for long term situations as appropriate  Outcome: Progressing     Problem: METABOLIC, FLUID AND ELECTROLYTES - ADULT  Goal: Electrolytes maintained within normal limits  Description  INTERVENTIONS:  - Monitor labs and assess patient for signs and symptoms of electrolyte imbalances  - Administer electrolyte replacement as ordered  - Monitor response to electrolyte replacements, including repeat lab results as appropriate  - Instruct patient on fluid and nutrition as appropriate  Outcome: Progressing  Goal: Fluid balance maintained  Description  INTERVENTIONS:  - Monitor labs   - Monitor I/O and WT  - Instruct patient on fluid and nutrition as appropriate  - Assess for signs & symptoms of volume excess or deficit  Outcome: Progressing

## 2019-12-30 NOTE — PHYSICAL THERAPY NOTE
PHYSICAL THERAPY EVALUATION      Patient Name: Maryse Lozada  BJRIP'Y Date: 12/30/2019   [de-identified] y o     [id number]    Paroxysmal atrial fibrillation (HCC) [I48 0]  Chest pain [R07 9]  EKG abnormalities [R94 31]  Ischemic cardiomyopathy [I25 5]  Pleural effusion on left [J90]  SHAHIDA (acute kidney injury) ([facility] ) [Z08 6]  Chronic systolic congestive heart failure ([facility] ) [I50 22]  Abdominal aortic aneurysm (AAA) 3 0 cm to 5 5 cm in diameter in male [facility]) [I71 4]    Past Medical History:   Diagnosis Date    A-fib ([facility] )     [facility]est[facility])     CKD (chronic kidney disease) stage 4, GFR 15-29 ml/min ([facility])     GERD (gastroesophageal reflux disease)     Glaucoma     Hypertension     Renal disorder      Past Surgical History:   Procedure Laterality Date    ABDOMINAL SURGERY      CARDIAC PACEMAKER PLACEMENT          12/30/19 1400   Note Type   Note type Eval only   Pain Assessment   Pain Assessment 0-10   Pain Score 8   Pain Type Chronic pain   Pain Location Shoulder;Leg   Pain Orientation Bilateral   Hospital Pain Intervention(s) Repositioned; Ambulation/increased activity   Response to Interventions tolerated   Home Living   Type of [address] [place] [address] Two level; Able to live on main level with bedroom/bathroom;Stairs to enter without rails  (2 SH, 1 SHERI)   Bathroom Shower/Tub Tub/shower unit   Bathroom Toilet Standard   Bathroom Equipment Grab bars in [facility]; Other (Comment)  (Rollator)   Additional Comments pt reports first floor set up   Prior Function   Level of [place] Independent with ADLs and functional mobility; Needs assistance with IADLs   Lives With Daughter   Receives Help From Family   ADL Assistance Independent   IADLs Needs assistance  (per pt dtr completes)   Falls in the last 6 months 0   Vocational Retired   Comments ?accuracy of social hx due to baseline dementia   pt reports being indep and amb w Rollator  pt reports he is unsure if dtr is home 24/7 or if she works- per pt "what does it matter if shes home or not"  pt follow up with "someone is always there"   Restrictions/Precautions   Weight Bearing Precautions Per Order No   Other Precautions Cognitive; Bed Alarm;Hard of hearing; Fall Risk;Pain   General   Additional Pertinent History pt admitted 12/29/19 w acute renal failure superimposed on stage 4 CKD; pt present to ED for chest pain, constipation  per nsg pt has hx of dementia   Family/Caregiver Present No   Cognition   Overall Cognitive Status Impaired   Arousal/Participation Alert   Attention Attends with cues to redirect   Orientation Level Oriented X4   Memory Decreased recall of recent events;Decreased short term memory;Decreased recall of biographical information   Following Commands Follows one step commands with increased time or repetition   Comments pt frequently respond to questions with "ma'm"  frequent cues to repeat instruction   RUE Assessment   RUE Assessment   (refer to OT)   LUE Assessment   LUE Assessment   (refer to OT)   RLE Assessment   RLE Assessment WFL  (grossly 4/5 to 4+/5)   LLE Assessment   LLE Assessment WFL  (grossly 4/5 to 4+/5)   Coordination   Sensation X  (pt reports numbness of RLE )   Light Touch   RLE Light Touch Grossly intact   LLE Light Touch Grossly intact   Bed Mobility   Sit to Supine 4  Minimal assistance   Additional items Bedrails; Increased time required;Verbal cues;LE management   Transfers   Stand to Sit 5  Supervision   Additional items Impulsive;Verbal cues   Stand pivot 5  Supervision   Additional items Increased time required; Other  (Rollator)   Toilet transfer 5  Supervision   Additional items Standard toilet; Other  (grab bars)   Ambulation/Elevation   Gait pattern Forward Flexion; Short stride; Excessively slow   Gait Assistance 5  Supervision   Additional items Assist x 1;Verbal cues   Assistive Device Other (Comment)  (Rollator) Distance 10'   Stair Management Assistance Not tested   Balance   Dynamic Sitting Fair +   Static Standing Fair   Dynamic Standing Fair -  (2' washing hands at sink- no obvious LOB)   Ambulatory Fair -  (Rollator)   Endurance Deficit   Endurance Deficit Yes   Endurance Deficit Description fatigue   Activity Tolerance   Activity Tolerance Patient limited by fatigue   Nurse Made Aware Carlos Grijalva RN; cleared for therapy, updated end of session   Assessment   Prognosis Good   Problem List Decreased strength;Decreased endurance; Impaired balance;Decreased mobility; Decreased coordination;Decreased cognition; Impaired judgement;Decreased safety awareness;Pain   Assessment Nina Rome is a [de-identified] y o  male admitted to [facility] on 12/29/2019 for Acute renal failure superimposed on stage 4 chronic kidney disease ([facility] )  Pt  has a past medical history of A-fib ([facility] ), CHF (congestive heart failure) ([facility] ), CKD (chronic kidney disease) stage 4, GFR 15-29 ml/min ([facility]), GERD (gastroesophageal reflux disease), Glaucoma, Hypertension, and Renal disorder  PT was consulted and pt was seen on 12/30/2019 for mobility assessment and d/c planning  Pt presents with high fall risk, monitoring of abn labs, PIV  Pt lives with his dtr in a two story home with 1 SHERI and first floor bed and bath  Prior to admission reports being indep with assist from dtr for IADLs (cooking, cleaning), and amb w Rollator  ? accuracy of social hx due to baseline dementia; pt reports being unsure if dtr is home w him all the time or not  Pt is currently functioning at a minimum assistance x1 level for bed mobility, supervision assistance x1 level for transfers, supervision assistance x1 level for ambulation with Rollator  Pt demonstrated significant cognitive deficits, poor judgement and safety awareness impacting safety and fall risk    Pt will benefit from continued skilled IP PT to address the above mentioned impairments  in order to maximize recovery and increase functional independence when completing mobility and ADLs  Currently PT recommendations for DME include cont use of Rollator for amb  At this time PT recommendations for d/c are STR when medically stable vs home w family support, 24/7 supervision and HHPT pending progress  End of session pt repositioned in bed w bed alarm engaged and all needs in reach  Nsg updated  Barriers to Discharge Inaccessible home environment;Decreased caregiver support   Barriers to Discharge Comments SHERI, ?level of support at home   Goals   Patient Goals none stated 2* to cognition   STG Expiration Date 01/13/20   Short Term Goal #1 1)  Pt will perform bed mobility with Moses demonstrating appropriate technique 100% of the time in order to improve function  2)  Perform all transfers with Moses demonstrating safe and appropriate technique 100% of the time in order to improve ability to negotiate safely in home environment  3) Amb with least restrictive AD > 150'x1 with mod I in order to demonstrate ability to negotiate in home environment  4)  Improve overall strength and balance 1/2 grade in order to optimize ability to perform functional tasks and reduce fall risk  5) Increase activity tolerance to 45 minutes in order to improve endurance to functional tasks  6)  Negotiate stairs using most appropriate technique and S in order to be able to negotiate safely in home environment  7) PT for ongoing patient and family/caregiver education, DME needs and d/c planning in order to promote highest level of function in least restrictive environment  PT Treatment Day 0   Plan   Treatment/Interventions Functional transfer training;Elevations;LE strengthening/ROM; Therapeutic exercise; Endurance training;Cognitive reorientation;Patient/family training;Equipment eval/education; Bed mobility;Gait training;Spoke to nursing;OT   PT Frequency Other (Comment)  (3-5/wk)   Recommendation   Recommendation Short-term skilled PT   PT - OK to Discharge Yes   Additional Comments when medically cleared   Modified [place]ublimity Scale   Modified [place]ubl[place]ity Scale 4   Barthel Index   Feeding 10   Bathing 0   Grooming Score 5   Dressing Score 5   Bladder Score 10   Bowels Score 10   Toilet Use Score 5   Transfers (Bed/Chair) Score 10   Mobility (Level Surface) Score 0   Stairs Score 0  (not tested)   Barthel Index Score 55   History: co - morbidities, age, social background (SHERI, ?supervision at home), fall risk, use of assistive device, assist for adl's, cognition (hx dementia), multiple lines  Exam: impairments in systems including musculoskeletal (ROM, strength, posture), neuromuscular (balance, locomotion, gait, transfers, motor function), integumentary (skin integrity), cognition  Clinical: unstable/unpredictable  Complexity:high      Arnaud Rashid, PT     *flowsheet time entered in error  See note for accurate tx time

## 2019-12-30 NOTE — PLAN OF CARE
Problem: OCCUPATIONAL THERAPY ADULT  Goal: Performs self-care activities at highest level of function for planned discharge setting  See evaluation for individualized goals  Description  Treatment Interventions: ADL retraining, Functional transfer training, UE strengthening/ROM, Endurance training, Cognitive reorientation, Patient/family training, Equipment evaluation/education, Compensatory technique education, Energy conservation, Activityengagement          See flowsheet documentation for full assessment, interventions and recommendations  Note:   Limitation: Decreased ADL status, Decreased UE strength, Decreased Safe judgement during ADL, Decreased cognition, Decreased endurance, Decreased self-care trans, Decreased high-level ADLs, Visual deficit  Prognosis: Fair, Good  Assessment: Patient is a [de-identified] y o  y/o male admitted to [facility] on 12/29/2019 due to Chest pain  Comorbidities affecting pt's physical performance at time of assessment include CKD, HTN and abdominal pain, dementia, CAD, GERD  Patient has active OT orders and activity orders for Up with assistance  Prior to admission pt was living with daughter in a single floor set up  Patient independent w/ all ADLs, and functional mobility, requiring A for IADL and bathing tasks tasks and used rollator for mobility  At the time of evaluation patient currently requires min-mod A for overall ADLs, and min A for functional mobility  The following deficits affected patient's occupational performance weakness, decreased functional strength, decreased functional balance, decreased activity tolerance, decreased safety awareness, impaired attention, impaired memory, impaired problem solving and impaired vision  Patient would benefit from skilled OT services while in the hospital to address above deficits   Occupational performance areas to be addressed include ADL retraining, bed mobility, functional transfer training, endurance training, patient/family training, equipment evaluation/education, compensatory technique education, activity engagement and activity tolerance in order to maximize patient's level of function  From OT standpoint recommend Short term rehab upon D/C  OT continue to follow pt 3-5x/week to address the following goals        OT Discharge Recommendation: Short Term Rehab  OT - OK to Discharge: (to rehab when medically stable)

## 2019-12-30 NOTE — PLAN OF CARE
Problem: Potential for Falls  Goal: Patient will remain free of falls  Description  INTERVENTIONS:  - Assess patient frequently for physical needs  -  Identify cognitive and physical deficits and behaviors that affect risk of falls    -  [place] fall precautions as indicated by assessment   - Educate patient/family on patient safety including physical limitations  - Instruct patient to call for assistance with activity based on assessment  - Modify environment to reduce risk of injury  - Consider OT/PT consult to assist with strengthening/mobility  Outcome: Progressing     Problem: Prexisting or High Potential for Compromised Skin Integrity  Goal: Skin integrity is maintained or improved  Description  INTERVENTIONS:  - Identify patients at risk for skin breakdown  - Assess and monitor skin integrity  - Assess and monitor nutrition and hydration status  - Monitor labs   - Assess for incontinence   - Turn and reposition patient  - Assist with mobility/ambulation  - Relieve pressure over bony prominences  - Avoid friction and shearing  - Provide appropriate hygiene as needed including keeping skin clean and dry  - Evaluate need for skin moisturizer/barrier cream  - Collaborate with interdisciplinary team   - Patient/family teaching  - Consider wound care consult   Outcome: Progressing     Problem: PAIN - ADULT  Goal: Verbalizes/displays adequate comfort level or baseline comfort level  Description  Interventions:  - Encourage patient to monitor pain and request assistance  - Assess pain using appropriate pain scale  - Administer analgesics based on type and severity of pain and evaluate response  - Implement non-pharmacological measures as appropriate and evaluate response  - Consider cultural and social influences on pain and pain management  - Notify physician/advanced practitioner if interventions unsuccessful or patient reports new pain  Outcome: Progressing     Problem: DISCHARGE PLANNING  Goal: Discharge to home or other facility with appropriate resources  Description  INTERVENTIONS:  - Identify barriers to discharge w/patient and caregiver  - Arrange for needed discharge resources and transportation as appropriate  - Identify discharge learning needs (meds, wound care, etc )  - Arrange for interpretive services to assist at discharge as needed  - Refer to Case Management Department for coordinating discharge planning if the patient needs post-hospital services based on physician/advanced practitioner order or complex needs related to functional status, cognitive ability, or social support system  Outcome: Progressing     Problem: Knowledge Deficit  Goal: Patient/family/caregiver demonstrates understanding of disease process, treatment plan, medications, and discharge instructions  Description  Complete learning assessment and assess knowledge base    Interventions:  - Provide teaching at level of understanding  - Provide teaching via preferred learning methods  Outcome: Progressing     Problem: CARDIOVASCULAR - ADULT  Goal: Maintains optimal cardiac output and hemodynamic stability  Description  INTERVENTIONS:  - Monitor I/O, vital signs and rhythm  - Monitor for S/S and trends of decreased cardiac output  - Administer and titrate ordered vasoactive medications to optimize hemodynamic stability  - Assess quality of pulses, skin color and temperature  - Assess for signs of decreased coronary artery perfusion  - Instruct patient to report change in severity of symptoms  Outcome: Progressing

## 2019-12-30 NOTE — CONSULTS
Consult - Cardiology   Lefty Marie [de-identified] y o  male MRN: [id number]  Unit/Bed#: E4 -01 Encounter: [id number]        Reason For Consult: Chest pain                  ASSESSMENT:  1  Chest pain:   -troponin < 0 02 X 3   -EKG's w/ dual paced rhythm, no acute ischemia    2  History of coronary artery disease:   -06/2016 CABG (LIMA-LAD, SVG-OM, SVG-RCA)   -severe left main disease (60% stenosis)    3  Ischemic cardiomyopathy, LVEF 29%:   -by echo 02/2019    4  Chronic systolic heart failure, NYHA Class 3, ACC/AHA Stage C   -OP diuretic Lasix 40 BID   -Baseline weight: 164 lbs? (as OP 10/2019)    5  Postop atrial fibrillation & sustained monomorphic VT (after CABG)   -ICD in Situ   -Chronic warfarin AC    6  Dual chamber Medtronic ICD in situ:   -no events on interrogation 12/29/19     7  History of DVT with IVC in situ    8  Acute on chronic renal failure:   -creatinine as low as 2 7 in April 2019   -4 2 on admission   -3 65 today      PLAN/ DISCUSSION:     1  Atypical chest pain, no objective evidence of ischemia  Pain is very different from his presenting symptoms which led to his CABG in 2016 (see HPI)  No further cardiac interventions necessary at this time    2  Continue high-dose statin, Isordil/ Hydral, Toprol XL       3  No aspirin due to concomitant warfarin and history of GI bleed    4  Continue warfarin (INR therapeutic at 2 25)    5  Would hold on further fluids until seen by Nephrology, recommend restarting diuretic once OK from their standpoint  He was on Lasix 40 BID, may be fine with once daily dosing (40 mg)    6  No signs of symptoms of decompensated CHF      History Of Present Illness: This is a very kind 58-year-old gentleman  He is originally from [address] [place] [address] and recently (less than a year ago) moved to the [facility] area to be closer to his daughter's with whom he lives  Although he is independent at baseline does seem that they help quite a bit with his care    He has an extensive cardiac history which began in June 2016 at which time he was hospitalized for back pain, found to have significant multivessel CAD, and underwent triple-vessel CABG  He was noted to have postoperative atrial fibrillation and apparent sustained monomorphic VT  On this hospitalization his ejection fraction was noted to have decreased to 30%  He has a dual chamber Medtronic ICD in situ, chronic systolic heart failure, history of DVT with IVC filter in situ, and long-term anticoagulation with warfarin  He does not take aspirin secondary to concomitant warfarin therapy and GI bleed when he was taking both of them  He currently follows with [facility] and was just seen to establish care in October 2019  He is presently hospitalized for a multitude of symptoms  These are highlighted by bilateral leg pain, worsening loss of vision in his right eye, abdominal pain, constipation, and chest pain  Chest pain has been present for 1-2 months  It is sharp and located on the left and right side of his chest   It lasts about 1 minutes  It resolved spontaneously  Does not radiate  It occurs frequently when he is at rest or simply standing  It is not associated with exertion  It is different from the pain he fell in 2016 when he underwent CABG surgery (had back pressure at that time)  He has no shortness of breath  He does not feel like he is retaining any fluid  He does not weigh himself daily  Since his admission he feels overall well          Past Medical History:        Past Medical History:   Diagnosis Date    A-fib ([facility] [address] )     [facility]est[facility] failure[facility]     CKD (chronic kidney disease) stage 4, GFR 15-29 ml/min ([facility])     GERD (gastroesophageal reflux disease)     Glaucoma     Hypertension     Renal disorder       Past Surgical History:   Procedure Laterality Date    ABDOMINAL SURGERY      CARDIAC PACEMAKER PLACEMENT          Allergy:        No Known Allergies    Medications:       Prior to Admission medications    Medication Sig Start Date End Date Taking?  Authorizing Provider   atorvastatin (LIPITOR) 80 mg tablet Take 80 mg by mouth daily 3/19/19  Yes Historical Provider, MD   furosemide (LASIX) 40 mg tablet Take 40 mg by mouth 2 (two) times a day 3/19/19  Yes Historical Provider, MD   gabapentin (NEURONTIN) 300 mg capsule Take 300 mg by mouth daily 3/19/19  Yes Historical Provider, MD   hydrALAZINE (APRESOLINE) 25 mg tablet Take 50 mg by mouth Three times a day 4/25/19  Yes Historical Provider, MD   isosorbide dinitrate (ISORDIL) 20 mg tablet Take 20 mg by mouth Three times a day 6/13/19 12/29/19 Yes Historical Provider, MD   metoprolol succinate (TOPROL-XL) 25 mg 24 hr tablet Take 25 mg by mouth daily 3/19/19  Yes Historical Provider, MD   pantoprazole (PROTONIX) 40 mg tablet Take 40 mg by mouth daily 3/19/19  Yes Historical Provider, MD   potassium chloride (K-DUR,KLOR-CON) 10 mEq tablet Take 10 mEq by mouth daily   Yes Historical Provider, MD   tamsulosin (FLOMAX) 0 4 mg Take 0 8 mg by mouth 5/17/19 5/16/20 Yes Historical Provider, MD   warfarin (COUMADIN) 3 mg tablet Take 3 mg by mouth daily   Yes Historical Provider, MD   diphenhydrAMINE (BENADRYL) 50 MG tablet Take 25 mg by mouth every 6 (six) hours as needed for itching     Historical Provider, MD   hydrocortisone 1 % cream Apply topically 4 (four) times a day as needed for irritation for up to 5 days (DO NOT USE FOR MORE THAN 5 DAYS) 7/21/19 8/20/19  Bryan Murillo DO       Family History:     Family History   Problem Relation Age of Onset    No Known Problems Mother     No Known Problems Father         Social History:       Social History     Socioeconomic History    Marital status: Single     Spouse name: None    Number of children: None    Years of education: None    Highest education level: None   Occupational History    None   Social Needs    Financial resource strain: None   Gabino-Michael insecurity:     Worry: None     Inability: None    Transportation needs:     Medical: None     Non-medical: None   Tobacco Use    Smoking status: Former Smoker     Packs/day: 2 00     Years: 30 00     Pack years: 60 00     Last attempt to quit: 1980     Years since quittin 0    Smokeless tobacco: Never Used    Tobacco comment: quit 40 years ago   Substance and Sexual Activity    Alcohol use: Never     Frequency: Never     Drinks per session: Patient refused     Binge frequency: Patient refused    Drug use: Never    Sexual activity: None   Lifestyle    Physical activity:     Days per week: None     Minutes per session: None    Stress: None   Relationships    Social connections:     Talks on phone: None     Gets together: None     Attends [employer] service: None     Active member of club or organization: None     Attends meetings of clubs or organizations: None     Relationship status: None    Intimate partner violence:     Fear of current or ex partner: None     Emotionally abused: None     Physically abused: None     Forced sexual activity: None   Other Topics Concern    None   Social History Narrative    None       ROS:  Symptoms per HPI  Remainder review of systems is negative    Exam:  General:  alert, oriented and in no distress, cooperative  Head: Normocephalic, atraumatic  Eyes:  EOMI  Pupils - equal, round, reactive to accomodation  No icterus  Normal Conjunctiva     Oropharynx: moist and normal-appearing mucosa  Neck: supple, symmetrical, trachea midline and no JVD  Heart:  RRR, No: murmer, rub or gallop, S1 & S2 normal   Respiratory effort / Chest Inspection: unlabored on room air  Lungs:  Clear bilaterally  Abdomen: flat, normal findings: bowel sounds normal and soft, non-tender  Lower Limbs:  no pitting edema  Pulses[de-identified]  RLE - DP: present 2+                 LLE - DP: present 2+  Musculoskeletal: ROM grossly normal      DATA:      ECG:       AV dual paced rhythm                Telemetry: Av dual paced rhythm, heart rate 60          Echocardiogram:      February 2019:  SUMMARY:  Moderately dilated LV (100 ml/m2) with eccentric LVH and  severe global LV systolic dysfunction with inferior  akinesis (LVEF 29%, GLS -6 2%)  Pseudonormal diastolic  dysfunction  Mild AV thickening with mild AS  Mild MR  Normal LA size  Elevated LA pressure  Mild RV enlargement  with mild RV hypokinesis  Mild TR  PASP    55 mmHg  Wire in  RA/RV  Dilated IVC c/w elevated RAP  Limited visualization  of aorta  Pleural effusion  Ischemic Testing:         Weights: Wt Readings from Last 3 Encounters:   12/29/19 70 5 kg (155 lb 6 8 oz)   09/23/19 77 1 kg (170 lb)   08/20/19 73 2 kg (161 lb 6 4 oz)   , Body mass index is 25 09 kg/m²           Lab Studies:    Results from last 7 days   Lab Units 12/29/19  1811 12/29/19  1434 12/29/19  1110   TROPONIN I ng/mL 0 02 0 02 0 02          Results from last 7 days   Lab Units 12/30/19  0554 12/29/19  1110   WBC Thousand/uL 3 94* 5 39   HEMOGLOBIN g/dL 9 6* 11 1*   HEMATOCRIT % 30 1* 36 4*   PLATELETS Thousands/uL 160 187   ,   Results from last 7 days   Lab Units 12/30/19  0554 12/29/19  1110   POTASSIUM mmol/L 3 4* 4 4   CHLORIDE mmol/L 106 106   CO2 mmol/L 20* 20*   BUN mg/dL 67* 67*   CREATININE mg/dL 3 65* 4 28*   CALCIUM mg/dL 8 9 9 4   ALK PHOS U/L  --  92   ALT U/L  --  15   AST U/L  --  31

## 2019-12-30 NOTE — PLAN OF CARE
Problem: PHYSICAL THERAPY ADULT  Goal: Performs mobility at highest level of function for planned discharge setting  See evaluation for individualized goals  Description  Treatment/Interventions: Functional transfer training, Elevations, LE strengthening/ROM, Therapeutic exercise, Endurance training, Cognitive reorientation, Patient/family training, Equipment eval/education, Bed mobility, Gait training, Spoke to nursing, OT          See flowsheet documentation for full assessment, interventions and recommendations  Note:   Prognosis: Good  Problem List: Decreased strength, Decreased endurance, Impaired balance, Decreased mobility, Decreased coordination, Decreased cognition, Impaired judgement, Decreased safety awareness, Pain  Assessment: Cristofer Wiggins is a [de-identified] y o  male admitted to [facility] on 12/29/2019 for Acute renal failure superimposed on stage 4 chronic kidney disease ([facility] [address] )  Pt  has a past medical history of A-fib ([facility] ), CHF (congestive heart failure) ([facility] ), CKD (chronic kidney disease) stage 4, GFR 15-29 ml/min ([facility]), GERD (gastroesophageal reflux disease), Glaucoma, Hypertension, and Renal disorder  PT was consulted and pt was seen on 12/30/2019 for mobility assessment and d/c planning  Pt presents with high fall risk, monitoring of abn labs, PIV  Pt lives with his dtr in a two story home with 1 SHERI and first floor bed and bath  Prior to admission reports being indep with assist from dtr for IADLs (cooking, cleaning), and amb w Rollator  ? accuracy of social hx due to baseline dementia; pt reports being unsure if dtr is home w him all the time or not  Pt is currently functioning at a minimum assistance x1 level for bed mobility, supervision assistance x1 level for transfers, supervision assistance x1 level for ambulation with Rollator  Pt demonstrated significant cognitive deficits, poor judgement and safety awareness impacting safety and fall risk    Pt will benefit from continued skilled IP PT to address the above mentioned impairments  in order to maximize recovery and increase functional independence when completing mobility and ADLs  Currently PT recommendations for DME include cont use of Rollator for amb  At this time PT recommendations for d/c are STR when medically stable  Barriers to Discharge: Inaccessible home environment, Decreased caregiver support  Barriers to Discharge Comments: SHERI, ?level of support at home  Recommendation: Short-term skilled PT     PT - OK to Discharge: Yes    See flowsheet documentation for full assessment

## 2019-12-30 NOTE — ASSESSMENT & PLAN NOTE
23-year-old male with a past medical history of CAD status post CABG, ischemic cardiomyopathy status post ICD (EF 29%), PAF on Coumadin, admitted due to intermittent chest pain over the past week in the setting acute renal failure superimposed stage IV CKD  - serial troponins negative  No need for inpatient workup for intermittent chest pain as per Cardiology  - renal function improved  - discussed case with Renal, they recommended keeping him for 1 more night in anticipation for restarting his diuretics tomorrow

## 2019-12-30 NOTE — PROGRESS NOTES
NEPHROLOGY PROGRESS NOTE   Leticia Baldwin [de-identified] y o  male MRN: [id number]  Unit/Bed#: E4 -01 Encounter: [id number]  Reason for Consult: SHAHIDA (POA)    ASSESSMENT/PLAN:  SHAHIDA (POA):  Likely prerenal in the setting of diuretics  Concern for progression of renal disease   -previously dialysis dependent for several months   -presents with sCr of 4 2    - creatinine improving    -baseline creatinine 2 8-3 2   -creatinine noted to be elevated to 3 7 in December  -urinalysis bland  -CT scan negative for hydro  -PVR: negative    -diuretic on hold, likely re-initiate in the next 24-48 hours     -on plasmalyte at 50 mL/hour, will discontinue    -avoid nephrotoxins  -I/O     CKD stage IV:  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, arterial nephrosclerosis, and component of cardiorenal syndrome  - will need Op follow up  Chronic systolic CHF/ischemic cardiomyopathy:  Status post ICD placement  EF 29%   -cardiology following   -diuretic currently on hold  -outpatient diuretic:  Lasix 40 mg p o  B i d, may need lower dose    -maintain fluid restriction   -continue daily weights, I/O  Hypertension:  Blood pressure remains acceptable   -continue metoprolol, hydralazine, and Imdur with holding parameters for systolic blood pressure less than 130   -avoid episodes of hypotension  Abdominal pain:  With elevated lipase  --liver function tests normal     Low bicarbonate level:  Likely secondary to acute renal failure   -will give oral bicarb replacement  Anemia of chronic disease:   -hemoglobin trending down   -continue to monitor and transfuse as needed  CKD/MBD:  -continue to follow phosphorus and PTH level as outpatient  History of BPH:  Denies current issues with urination   -continue Flomax  Hypokalemia:  -continue monitor and replace as needed   -last Mag level was acceptable at 2 4  SUBJECTIVE:  The patient is resting in his chair  He denies chest pain or shortness of breath    He denies nausea, vomiting, diarrhea  He states that he has not had a bowel movement  He denies issues with urination  He states he is eating and drinking well  He admits to drinking about 3 glasses of water per day      OBJECTIVE:  Current Weight: Weight - Scale: 70 5 kg (155 lb 6 8 oz)  Vitals:    12/29/19 1900 12/29/19 2300 12/30/19 0300 12/30/19 0714   BP: 95/52 96/50 103/56 110/57   BP Location: Left arm Right arm Right arm Right arm   Pulse: 56 60 58 60   Resp: 16 18 18 18   Temp: 98 6 °F (37 °C) 97 9 °F (36 6 °C) 97 6 °F (36 4 °C) 97 8 °F (36 6 °C)   TempSrc: Tympanic Tympanic Tympanic Tympanic   SpO2: 96% 90% 95% 98%   Weight:       Height:           Intake/Output Summary (Last 24 hours) at 12/30/2019 0919  Last data filed at 12/30/2019 6366  Gross per 24 hour   Intake 1380 ml   Output 1350 ml   Net 30 ml     General: NAD  Skin: warm, dry, intact, no rash  HEENT: Moist mucous membranes, sclera anicteric, normocephalic, atraumatic  Neck: No apparent JVD appreciated  Chest:lung sounds clear B/L, on RA   CVS:Regular rate and rhythm, no murmer   Abdomen: Soft, round, non-tender, +BS  Extremities: No B/L LE edema present  Neuro: alert and oriented  Psych: appropriate mood and affect     Medications:    Current Facility-Administered Medications:     acetaminophen (TYLENOL) tablet 650 mg, 650 mg, Oral, Q6H PRN, AUDREY Denton    atorvastatin (LIPITOR) tablet 80 mg, 80 mg, Oral, After Dinner, AUDREY Denton, 80 mg at 12/29/19 1712    gabapentin (NEURONTIN) capsule 300 mg, 300 mg, Oral, Daily, AUDREY Denton, 300 mg at 12/30/19 3700    hydrALAZINE (APRESOLINE) tablet 50 mg, 50 mg, Oral, Q8H ELOISE, AUDREY Romano    isosorbide dinitrate (ISORDIL) tablet 20 mg, 20 mg, Oral, TID after meals, AUDREY Denton, 20 mg at 12/30/19 0081    metoprolol succinate (TOPROL-XL) 24 hr tablet 25 mg, 25 mg, Oral, Daily, AUDREY Denton, 25 mg at 12/30/19 3926    multi-electrolyte (PLASMALYTE-A/ISOLYTE-S PH 7 4) IV solution, 60 mL/hr, Intravenous, Continuous, AUDREY Valdovinos, Last Rate: 60 mL/hr at 12/29/19 1712, 60 mL/hr at 12/29/19 1712    pantoprazole (PROTONIX) EC tablet 40 mg, 40 mg, Oral, Daily, Muñiz Finely, CRNP, 40 mg at 12/30/19 3196    tamsulosin (FLOMAX) capsule 0 8 mg, 0 8 mg, Oral, Daily With Anushka Oms, CRNP, 0 8 mg at 12/29/19 1712    warfarin (COUMADIN) tablet 3 mg, 3 mg, Oral, Daily (warfarin), Muñiz Finely, CRNP, 3 mg at 12/29/19 1713    Laboratory Results:  Results from last 7 days   Lab Units 12/30/19  0554 12/29/19  1110   WBC Thousand/uL 3 94* 5 39   HEMOGLOBIN g/dL 9 6* 11 1*   HEMATOCRIT % 30 1* 36 4*   PLATELETS Thousands/uL 160 187   POTASSIUM mmol/L 3 4* 4 4   CHLORIDE mmol/L 106 106   CO2 mmol/L 20* 20*   BUN mg/dL 67* 67*   CREATININE mg/dL 3 65* 4 28*   CALCIUM mg/dL 8 9 9 4   MAGNESIUM mg/dL  --  2 4

## 2019-12-30 NOTE — ASSESSMENT & PLAN NOTE
Continue monitoring INR  Therapeutic    Continue rate control agents    Recent Labs     12/29/19  1205 12/30/19  0554   INR 2 25* 2 54*

## 2019-12-31 LAB
ANION GAP SERPL CALCULATED.3IONS-SCNC: 12 MMOL/L (ref 4–13)
ATRIAL RATE: 65 BPM
BUN SERPL-MCNC: 62 MG/DL (ref 5–25)
CALCIUM SERPL-MCNC: 8.9 MG/DL (ref 8.3–10.1)
CHLORIDE SERPL-SCNC: 106 MMOL/L (ref 100–108)
CO2 SERPL-SCNC: 22 MMOL/L (ref 21–32)
CREAT SERPL-MCNC: 3.48 MG/DL (ref 0.6–1.3)
GFR SERPL CREATININE-BSD FRML MDRD: 16 ML/MIN/1.73SQ M
GLUCOSE SERPL-MCNC: 92 MG/DL (ref 65–140)
INR PPP: 2.68 (ref 0.84–1.19)
P AXIS: 68 DEGREES
POTASSIUM SERPL-SCNC: 3.8 MMOL/L (ref 3.5–5.3)
PR INTERVAL: 150 MS
PROTHROMBIN TIME: 29.1 SECONDS (ref 11.6–14.5)
QRS AXIS: -57 DEGREES
QRSD INTERVAL: 130 MS
QT INTERVAL: 410 MS
QTC INTERVAL: 426 MS
SODIUM SERPL-SCNC: 140 MMOL/L (ref 136–145)
T WAVE AXIS: 245 DEGREES
VENTRICULAR RATE: 65 BPM

## 2019-12-31 PROCEDURE — 99232 SBSQ HOSP IP/OBS MODERATE 35: CPT | Performed by: INTERNAL MEDICINE

## 2019-12-31 PROCEDURE — 93010 ELECTROCARDIOGRAM REPORT: CPT | Performed by: INTERNAL MEDICINE

## 2019-12-31 PROCEDURE — 97530 THERAPEUTIC ACTIVITIES: CPT

## 2019-12-31 PROCEDURE — 80048 BASIC METABOLIC PNL TOTAL CA: CPT | Performed by: NURSE PRACTITIONER

## 2019-12-31 PROCEDURE — 97110 THERAPEUTIC EXERCISES: CPT

## 2019-12-31 PROCEDURE — 85610 PROTHROMBIN TIME: CPT | Performed by: STUDENT IN AN ORGANIZED HEALTH CARE EDUCATION/TRAINING PROGRAM

## 2019-12-31 PROCEDURE — 99233 SBSQ HOSP IP/OBS HIGH 50: CPT | Performed by: INTERNAL MEDICINE

## 2019-12-31 RX ORDER — FUROSEMIDE 40 MG/1
40 TABLET ORAL DAILY
Status: DISCONTINUED | OUTPATIENT
Start: 2019-12-31 | End: 2020-01-02 | Stop reason: HOSPADM

## 2019-12-31 RX ADMIN — METOPROLOL SUCCINATE 25 MG: 25 TABLET, EXTENDED RELEASE ORAL at 09:07

## 2019-12-31 RX ADMIN — TAMSULOSIN HYDROCHLORIDE 0.8 MG: 0.4 CAPSULE ORAL at 17:44

## 2019-12-31 RX ADMIN — ISOSORBIDE DINITRATE 20 MG: 10 TABLET ORAL at 12:06

## 2019-12-31 RX ADMIN — FUROSEMIDE 40 MG: 40 TABLET ORAL at 12:05

## 2019-12-31 RX ADMIN — ATORVASTATIN CALCIUM 80 MG: 80 TABLET, FILM COATED ORAL at 17:45

## 2019-12-31 RX ADMIN — ISOSORBIDE DINITRATE 20 MG: 10 TABLET ORAL at 09:08

## 2019-12-31 RX ADMIN — HYDRALAZINE HYDROCHLORIDE 50 MG: 25 TABLET ORAL at 13:26

## 2019-12-31 RX ADMIN — GABAPENTIN 300 MG: 300 CAPSULE ORAL at 09:07

## 2019-12-31 RX ADMIN — PANTOPRAZOLE SODIUM 40 MG: 40 TABLET, DELAYED RELEASE ORAL at 06:11

## 2019-12-31 RX ADMIN — WARFARIN SODIUM 3 MG: 3 TABLET ORAL at 18:56

## 2019-12-31 NOTE — PLAN OF CARE
Problem: PHYSICAL THERAPY ADULT  Goal: Performs mobility at highest level of function for planned discharge setting  See evaluation for individualized goals  Description  Treatment/Interventions: Functional transfer training, Elevations, LE strengthening/ROM, Therapeutic exercise, Endurance training, Cognitive reorientation, Patient/family training, Equipment eval/education, Bed mobility, Gait training, Spoke to nursing, OT          See flowsheet documentation for full assessment, interventions and recommendations  Outcome: Progressing  Note:   Prognosis: Good  Problem List: Decreased strength, Decreased endurance, Impaired balance, Decreased mobility, Decreased coordination, Decreased cognition, Impaired judgement, Decreased safety awareness, Pain  Assessment: Lauro Steward was seen for a follow up session today; pt reporting fatigue but agreeable to therapy at this time  Instructed pt on seated HEP; pt demonstrate understanding w supervision to complete  Noted decreased R ankle AROM DF (observed pt R ankle DF PROM wfl) during LE TE and ambulation- pt may benefit from AFO due to impact that it has on safe mobility  Pt able to progress amb distance this session however require seated rest break due to decreased endurance  Discussed w pt recommendation for STR at discharge given decreased activity tolerance, decline from baseline fxn and fall risk; pt refusing STR at this time stating "Stop trying to put me in a home!" Attempted to explain to pt multiple times that STR is not a home and instead a temporary facility where he can go to address current problem list and improve mobility for safe and optimal quality of life  Pt refuse teaching with repeated reply "I can rehab myself  Stop trying to put me in a home!" End of session pt repositioned in bed, bed alarm engaged and all needs in reach     Barriers to Discharge: Inaccessible home environment, Decreased caregiver support  Barriers to Discharge Comments: SHERI, ?level of support at home given decline from baseline fxn  Recommendation: Short-term skilled PT     PT - OK to Discharge: Yes    See flowsheet documentation for full assessment

## 2019-12-31 NOTE — PLAN OF CARE
Problem: Potential for Falls  Goal: Patient will remain free of falls  Description  INTERVENTIONS:  - Assess patient frequently for physical needs  -  Identify cognitive and physical deficits and behaviors that affect risk of falls    -  Brogue fall precautions as indicated by assessment   - Educate patient/family on patient safety including physical limitations  - Instruct patient to call for assistance with activity based on assessment  - Modify environment to reduce risk of injury  - Consider OT/PT consult to assist with strengthening/mobility  Outcome: Progressing     Problem: Prexisting or High Potential for Compromised Skin Integrity  Goal: Skin integrity is maintained or improved  Description  INTERVENTIONS:  - Identify patients at risk for skin breakdown  - Assess and monitor skin integrity  - Assess and monitor nutrition and hydration status  - Monitor labs   - Assess for incontinence   - Turn and reposition patient  - Assist with mobility/ambulation  - Relieve pressure over bony prominences  - Avoid friction and shearing  - Provide appropriate hygiene as needed including keeping skin clean and dry  - Evaluate need for skin moisturizer/barrier cream  - Collaborate with interdisciplinary team   - Patient/family teaching  - Consider wound care consult   Outcome: Progressing     Problem: PAIN - ADULT  Goal: Verbalizes/displays adequate comfort level or baseline comfort level  Description  Interventions:  - Encourage patient to monitor pain and request assistance  - Assess pain using appropriate pain scale  - Administer analgesics based on type and severity of pain and evaluate response  - Implement non-pharmacological measures as appropriate and evaluate response  - Consider cultural and social influences on pain and pain management  - Notify physician/advanced practitioner if interventions unsuccessful or patient reports new pain  Outcome: Progressing     Problem: DISCHARGE PLANNING  Goal: Discharge to home or other facility with appropriate resources  Description  INTERVENTIONS:  - Identify barriers to discharge w/patient and caregiver  - Arrange for needed discharge resources and transportation as appropriate  - Identify discharge learning needs (meds, wound care, etc )  - Arrange for interpretive services to assist at discharge as needed  - Refer to Case Management Department for coordinating discharge planning if the patient needs post-hospital services based on physician/advanced practitioner order or complex needs related to functional status, cognitive ability, or social support system  Outcome: Progressing     Problem: Knowledge Deficit  Goal: Patient/family/caregiver demonstrates understanding of disease process, treatment plan, medications, and discharge instructions  Description  Complete learning assessment and assess knowledge base    Interventions:  - Provide teaching at level of understanding  - Provide teaching via preferred learning methods  Outcome: Progressing     Problem: CARDIOVASCULAR - ADULT  Goal: Maintains optimal cardiac output and hemodynamic stability  Description  INTERVENTIONS:  - Monitor I/O, vital signs and rhythm  - Monitor for S/S and trends of decreased cardiac output  - Administer and titrate ordered vasoactive medications to optimize hemodynamic stability  - Assess quality of pulses, skin color and temperature  - Assess for signs of decreased coronary artery perfusion  - Instruct patient to report change in severity of symptoms  Outcome: Progressing  Goal: Absence of cardiac dysrhythmias or at baseline rhythm  Description  INTERVENTIONS:  - Continuous cardiac monitoring, vital signs, obtain 12 lead EKG if ordered  - Administer antiarrhythmic and heart rate control medications as ordered  - Monitor electrolytes and administer replacement therapy as ordered  Outcome: Progressing     Problem: GENITOURINARY - ADULT  Goal: Maintains or returns to baseline urinary function  Description  INTERVENTIONS:  - Assess urinary function  - Encourage oral fluids to ensure adequate hydration if ordered  - Administer IV fluids as ordered to ensure adequate hydration  - Administer ordered medications as needed  - Offer frequent toileting  - Follow urinary retention protocol if ordered  Outcome: Progressing  Goal: Absence of urinary retention  Description  INTERVENTIONS:  - Assess patients ability to void and empty bladder  - Monitor I/O  - Bladder scan as needed  - Discuss with physician/AP medications to alleviate retention as needed  - Discuss catheterization for long term situations as appropriate  Outcome: Progressing     Problem: METABOLIC, FLUID AND ELECTROLYTES - ADULT  Goal: Electrolytes maintained within normal limits  Description  INTERVENTIONS:  - Monitor labs and assess patient for signs and symptoms of electrolyte imbalances  - Administer electrolyte replacement as ordered  - Monitor response to electrolyte replacements, including repeat lab results as appropriate  - Instruct patient on fluid and nutrition as appropriate  Outcome: Progressing  Goal: Fluid balance maintained  Description  INTERVENTIONS:  - Monitor labs   - Monitor I/O and WT  - Instruct patient on fluid and nutrition as appropriate  - Assess for signs & symptoms of volume excess or deficit  Outcome: Progressing

## 2019-12-31 NOTE — SOCIAL WORK
CM spoke with pt regarding therapy's recommendation for STR at time of discharge  Pt does not want to go to a STR when medically cleared, he would prefer to go home  CM inquired about [facility] for home therapy and pt requested CM call his daughter to discuss with her  CM called Priscilla Plaza ([phone]) and left a voicemail requesting a return call  CM to follow

## 2019-12-31 NOTE — ASSESSMENT & PLAN NOTE
· Patient resides with daughter who reports frequent falls recently  His daughter attributes this to dragging his right foot due to right leg pain secondary to vascular disease    · Ambulates with a walker  · PT/OT evaluate and treat

## 2019-12-31 NOTE — SOCIAL WORK
CM attempted to meet with pt but pt was asleep and did not wake with verbal cues  CM contacted pt's emergency contact, Arlin Fletcher ([phone]), pt's daughter  CM introduced CM role and began discharge planning  The following information was gathered from pt's daughter, Rafael Workman:    Pt lives with his daughter in a 1 story house that has 1 SHERI  Pt was primarily independent with ADLs PTA but required assistance getting in the shower and required assistance with other ADLs such as dressing  Pt uses a rolling walker to ambulate  Pt has a history of [facility] about two months PTA  Pt does not drive, his daughter transports pt to appointments  PCP is Dr Tatyana Altamirano ([phone]) and pt uses [address] for prescriptions  Pt's designated POA is pt's son in law, Cortney townsend  Pt receives SSI benefits  The pt has been a resident at a STR in the past, however pt's daughter believes pt will refuse this recent STR recommendation  Pt has no history of inpatient MH or D/A treatment  Pt's daughter to transport at time of discharge  CM spoke with pt's daughter regarding therapy's recommendation for STR at time of discharge  Pt's daughter stated that she wants CM and physicians to talk about the importance of STR with pt because she believes he will refuse the recommendation  CM to follow  CM reviewed d/c planning process including the following: identifying help at home, patient preference for d/c planning needs, Discharge Lounge, [facility] Meds to Bed program, availability of treatment team to discuss questions or concerns patient and/or family may have regarding understanding medications and recognizing signs and symptoms once discharged  CM also encouraged patient to follow up with all recommended appointments after discharge  Patient advised of importance for patient and family to participate in managing patients medical well being

## 2019-12-31 NOTE — PROGRESS NOTES
Progress Note Markel Oviedo 1940, [de-identified] y o  male MRN: [id number]    Unit/Bed#: E4 -01 Encounter: [id number]    Primary Care Provider: Fabien Beach PA-C   Date and time admitted to hospital: 12/29/2019 10:55 AM        Ambulatory dysfunction  Assessment & Plan  · Patient resides with daughter who reports frequent falls recently  His daughter attributes this to dragging his right foot due to right leg pain secondary to vascular disease  · Ambulates with a walker  · PT/OT evaluate and treat    Chest pain  Assessment & Plan  Atypical chest pain  Appreciate cardiology recommendations  No need for inpatient workup  Outpatient followup with cardiologist   Patient with ongoing atypical chest pain    CKD (chronic kidney disease), stage IV [facility])  Assessment & Plan  Nephrology following  BMP in a m  To monitor creatinine and electrolytes  Nephrology evaluating patient's volume status and consider restarting diuretic    HTN (hypertension)  Assessment & Plan  Continue home antihypertensives  Monitor blood pressure with routine vitals    Paroxysmal atrial fibrillation [facility])  Assessment & Plan      Recent Labs     12/29/19  1205 12/30/19  0554 12/31/19  0542   INR 2 25* 2 54* 2 68*    Continue Coumadin at current dosing, monitor INR daily goal INR 2 0 to 3 0 currently therapeutic  Continue Toprol-XL 25 mg daily        GERD (gastroesophageal reflux disease)  Assessment & Plan  Continue PPI  Monitor clinically      VTE Pharmacologic Prophylaxis:   Pharmacologic: Warfarin (Coumadin)  Mechanical VTE Prophylaxis in Place: Yes    Patient Centered Rounds:      Discussions with Specialists or Other Care Team Provider:  v    Education and Discussions with Family / Patient:  Bedside    Time Spent for Care: 30 minutes  More than 50% of total time spent on counseling and coordination of care as described above      Current Length of Stay: 2 day(s)    Current Patient Status: Inpatient   Certification Statement: The patient will continue to require additional inpatient hospital stay due to Diuretic titration by Nephrology    Discharge Plan:  Once cleared by specials will be considered stable for discharge home    Code Status: Level 1 - Full Code      Subjective:   No acute events overnight patient still complaining of intermittent chest pain similar to the pain that brought him in substernal sharp lasting a few seconds without exacerbating or relieving factors  Objective:     Vitals:   Temp (24hrs), Av 7 °F (36 5 °C), Min:97 °F (36 1 °C), Max:98 9 °F (37 2 °C)    Temp:  [97 °F (36 1 °C)-98 9 °F (37 2 °C)] 98 9 °F (37 2 °C)  HR:  [60-66] 60  Resp:  [17-18] 17  BP: ()/(54-64) 120/64  SpO2:  [95 %-98 %] 95 %  Body mass index is 25 94 kg/m²  Input and Output Summary (last 24 hours): Intake/Output Summary (Last 24 hours) at 2019 0854  Last data filed at 2019 0455  Gross per 24 hour   Intake 261 ml   Output 650 ml   Net -389 ml       Physical Exam:     Physical Exam   Constitutional: He appears well-developed and well-nourished  No distress  HENT:   Head: Normocephalic and atraumatic  Right Ear: External ear normal    Left Ear: External ear normal    Nose: Nose normal    Mouth/Throat: Oropharynx is clear and moist  No oropharyngeal exudate  Eyes: Conjunctivae are normal  Right eye exhibits no discharge  Left eye exhibits no discharge  No scleral icterus  Neck: Normal range of motion  No JVD present  No tracheal deviation present  No thyromegaly present  Cardiovascular: Normal rate, normal heart sounds and intact distal pulses  Exam reveals no gallop and no friction rub  No murmur heard  Irregularly irregular rhythm   Pulmonary/Chest: Effort normal and breath sounds normal  No stridor  No respiratory distress  He has no wheezes  He has no rales  Abdominal: Soft  Bowel sounds are normal  He exhibits no distension  There is no tenderness  There is no rebound and no guarding     Musculoskeletal: Normal range of motion  He exhibits no edema, tenderness or deformity  Neurological: He is alert  He has normal reflexes  He exhibits normal muscle tone  Coordination normal    Skin: Skin is warm and dry  No rash noted  He is not diaphoretic  No erythema  No pallor  Psychiatric: He has a normal mood and affect  His behavior is normal  Judgment and thought content normal    Nursing note and vitals reviewed  Additional Data:     Labs:    Results from last 7 days   Lab Units 12/30/19  0554 12/29/19  1110   WBC Thousand/uL 3 94* 5 39   HEMOGLOBIN g/dL 9 6* 11 1*   HEMATOCRIT % 30 1* 36 4*   PLATELETS Thousands/uL 160 187   NEUTROS PCT %  --  67   LYMPHS PCT %  --  20   MONOS PCT %  --  8   EOS PCT %  --  5     Results from last 7 days   Lab Units 12/31/19  0542  12/29/19  1110   SODIUM mmol/L 140   < > 137   POTASSIUM mmol/L 3 8   < > 4 4   CHLORIDE mmol/L 106   < > 106   CO2 mmol/L 22   < > 20*   BUN mg/dL 62*   < > 67*   CREATININE mg/dL 3 48*   < > 4 28*   ANION GAP mmol/L 12   < > 11   CALCIUM mg/dL 8 9   < > 9 4   ALBUMIN g/dL  --   --  3 6   TOTAL BILIRUBIN mg/dL  --   --  0 56   ALK PHOS U/L  --   --  92   ALT U/L  --   --  15   AST U/L  --   --  31   GLUCOSE RANDOM mg/dL 92   < > 99    < > = values in this interval not displayed  Results from last 7 days   Lab Units 12/31/19  0542   INR  2 68*             Results from last 7 days   Lab Units 12/29/19  1433 12/29/19  1133   LACTIC ACID mmol/L 0 7 0 9           * I Have Reviewed All Lab Data Listed Above  * Additional Pertinent Lab Tests Reviewed:  Adam 66 Admission Reviewed    Imaging:    Imaging Reports Reviewed Today Include:    Imaging Personally Reviewed by Myself Includes:       Recent Cultures (last 7 days):           Last 24 Hours Medication List:     Current Facility-Administered Medications:  acetaminophen 650 mg Oral Q6H PRN AUDREY Louise   atorvastatin 80 mg Oral After [address] [facility] gabapentin 300 mg Oral Daily AUDREY Valadez   hydrALAZINE 50 mg Oral [facility] Bill EspinoAUDREY   isosorbide dinitrate 20 mg Oral TID after meals AUDREY Valadez   metoprolol succinate 25 mg Oral Daily AUDREY Valadez   pantoprazole 40 mg Oral Daily AUDREY Valadez   sodium bicarbonate 650 mg Oral TID after meals AUDREY Smith   tamsulosin 0 8 mg Oral Daily With Dinner AUDREY Valadez   warfarin 3 mg Oral Daily (warfarin) AUDREY Valadez        Today, Patient Was Seen By: Beau Rivera MD    ** Please Note: Dictation voice to text software may have been used in the creation of this document   **

## 2019-12-31 NOTE — PROGRESS NOTES
NEPHROLOGY PROGRESS NOTE   Joyce Garcias [de-identified] y o  male MRN: [id number]  Unit/Bed#: E4 -01 Encounter: [id number]  Reason for Consult: SHAHIDA (POA)    ASSESSMENT/PLAN:  SHAHIDA (POA):  Likely prerenal in the setting of diuretics  Concern for progression of renal disease   -previously dialysis dependent for several months   -presents with sCr of 4 2    - creatinine improving    - S/P IVF  -baseline creatinine 2 8-3 2   -urinalysis bland  -CT scan negative for hydro  -PVR: negative    -good UOP  -will restart Lasix 40 mg daily today    -avoid nephrotoxins  -I/O      CKD stage IV:  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, arterial nephrosclerosis, and component of cardiorenal syndrome  - will need Op follow up       Chronic systolic CHF/ischemic cardiomyopathy:  Status post ICD placement  EF 29%   -cardiology following   -restart Lasix 40 mg po daily today    -outpatient diuretic:  Lasix 40 mg p o  B i d    -maintain fluid restriction   -continue daily weights, I/O      Hypertension:  Blood pressure remains acceptable   -continue metoprolol, hydralazine, and Imdur with holding parameters for systolic blood pressure less than 130   -avoid episodes of hypotension      Abdominal pain:  With elevated lipase   -liver function tests normal      Low bicarbonate level:  Likely secondary to acute renal failure  Resolved  -S/P replacement       Anemia of chronic disease:   -continue to monitor and transfuse as needed      CKD/MBD:  -continue to follow phosphorus and PTH level as outpatient      History of BPH:  Denies current issues with urination   -continue Flomax      Hypokalemia: Resolved  -continue monitor and replace as needed   -last Mag level was acceptable at 2 4        SUBJECTIVE:  The patient is resting bed  He denies chest pain or shortness of breath  He denies nausea, vomiting, diarrhea  He states that he had a bowel movement last night  He denies any issues with his appetite    He did however gain 5 lb overnight per documentation      OBJECTIVE:  Current Weight: Weight - Scale: 72 9 kg (160 lb 11 5 oz)  Vitals:    12/30/19 1955 12/30/19 2322 12/31/19 0544 12/31/19 0611   BP: 103/54 118/60  120/64   BP Location: Right arm Right arm     Pulse: 66 60     Resp: 18 17     Temp: (!) 97 2 °F (36 2 °C) 98 9 °F (37 2 °C)     TempSrc: Tympanic Temporal     SpO2: 98% 95%     Weight:   72 9 kg (160 lb 11 5 oz)    Height:           Intake/Output Summary (Last 24 hours) at 12/31/2019 0900  Last data filed at 12/31/2019 0455  Gross per 24 hour   Intake 261 ml   Output 650 ml   Net -389 ml     General: NAD  Skin: warm, dry, intact, no rash  HEENT: Moist mucous membranes, sclera anicteric, normocephalic, atraumatic  Neck: No apparent JVD appreciated  Chest:lung sounds clear B/L, on RA   CVS:Regular rate and rhythm, no murmer   Abdomen: Soft, round, non-tender, +BS  Extremities: No B/L LE edema present  Neuro: alert and oriented  Psych: appropriate mood and affect     Medications:    Current Facility-Administered Medications:     acetaminophen (TYLENOL) tablet 650 mg, 650 mg, Oral, Q6H PRN, AUDREY Ramos    atorvastatin (LIPITOR) tablet 80 mg, 80 mg, Oral, After Dinner, AUDREY Ramos, 80 mg at 12/30/19 1721    gabapentin (NEURONTIN) capsule 300 mg, 300 mg, Oral, Daily, KEON RamosNP, 300 mg at 12/30/19 7019    hydrALAZINE (APRESOLINE) tablet 50 mg, 50 mg, Oral, Q8H [facility], AUDREY Jack    isosorbide dinitrate (ISORDIL) tablet 20 mg, 20 mg, Oral, TID after meals, AUDREY Ramos, 20 mg at 12/30/19 5102    metoprolol succinate (TOPROL-XL) 24 hr tablet 25 mg, 25 mg, Oral, Daily, AUDREY Ramos, 25 mg at 12/30/19 0851    pantoprazole (PROTONIX) EC tablet 40 mg, 40 mg, Oral, Daily, AUDREY Ramos, 40 mg at 12/31/19 0819    sodium bicarbonate tablet 650 mg, 650 mg, Oral, TID after meals, AUDREY Martinez, 650 mg at 12/30/19 1722    tamsulosin [facility]) capsule 0 8 mg, 0 8 mg, Oral, Daily With AUDREY Zuniga, 0 8 mg at 12/30/19 1722    warfarin (COUMADIN) tablet 3 mg, 3 mg, Oral, Daily (warfarin), AUDREY Hwagn, 3 mg at 12/30/19 1722    Laboratory Results:  Results from last 7 days   Lab Units 12/31/19  0542 12/30/19  0554 12/29/19  1110   WBC Thousand/uL  --  3 94* 5 39   HEMOGLOBIN g/dL  --  9 6* 11 1*   HEMATOCRIT %  --  30 1* 36 4*   PLATELETS Thousands/uL  --  160 187   POTASSIUM mmol/L 3 8 3 4* 4 4   CHLORIDE mmol/L 106 106 106   CO2 mmol/L 22 20* 20*   BUN mg/dL 62* 67* 67*   CREATININE mg/dL 3 48* 3 65* 4 28*   CALCIUM mg/dL 8 9 8 9 9 4   MAGNESIUM mg/dL  --   --  2 4

## 2019-12-31 NOTE — PHYSICAL THERAPY NOTE
PHYSICAL THERAPY NOTE          Patient Name: Remberto Shields  UARLR'H Date: 12/31/2019   Time in: 1035 Time Out: 1101       12/31/19 1101   Pain Assessment   Pain Assessment 0-10   Pain Score 8   Pain Type Chronic pain   Pain Location Shoulder;Leg   Pain Orientation Other (Comment)  (L shoulder and toes, RLE)   Hospital Pain Intervention(s) Repositioned; Ambulation/increased activity; Rest   Response to Interventions tolerated   Pain Rating: FLACC (Rest) - Face 0   Pain Rating: FLACC (Rest) - Legs 0   Pain Rating: FLACC (Rest) - Activity 0   Pain Rating: FLACC (Rest) - Cry 0   Pain Rating: FLACC (Rest) - Consolability 0   Score: FLACC (Rest) 0   Pain Rating: FLACC (Activity) - Face 1   Pain Rating: FLACC (Activity) - Legs 0   Pain Rating: FLACC (Activity) - Activity 0   Pain Rating: FLACC (Activity) - Cry 1   Pain Rating: FLACC (Activity) - Consolability 1   Score: FLACC (Activity) 3   Restrictions/Precautions   Weight Bearing Precautions Per Order No   Other Precautions Cognitive; Bed Alarm;Telemetry; Fall Risk;Pain   General   Chart Reviewed Yes   Additional Pertinent History pt admitted 12/29/19 w acute renal failure superimposed on stage 4 CKD; pt present to ED for chest pain, constipation  per nsg pt has hx of dementia   Response to Previous Treatment Patient reporting fatigue but able to participate     Family/Caregiver Present No   Cognition   Overall Cognitive Status Impaired   Arousal/Participation Alert   Attention Attends with cues to redirect   Orientation Level Oriented to person;Oriented to place;Oriented to time;Disoriented to situation   Memory Decreased recall of precautions;Decreased recall of biographical information;Decreased short term memory;Decreased recall of recent events   Following Commands Follows one step commands with increased time or repetition   Comments poor insight into deficits, decreased judgement and safety awareness   Subjective   Subjective "I'll rehab myself  Stop trying to put me in a home"   Bed Mobility   Supine to Sit 5  Supervision   Additional items Bedrails; Increased time required;Verbal cues;LE management   Sit to Supine 5  Supervision   Additional items Bedrails; Increased time required;Verbal cues;LE management   Additional Comments supervision for safety; pt demonstrates difficulty w moving RLE   Transfers   Sit to Stand 5  Supervision   Additional items Increased time required;Verbal cues   Stand to Sit 5  Supervision   Additional items Increased time required;Verbal cues   Stand pivot 5  Supervision   Additional items Increased time required;Verbal cues; Other  (Rollator)   Additional Comments cues for safety and hand placement   Ambulation/Elevation   Gait pattern Forward Flexion; Wide ERICA; Decreased foot clearance; Excessively slow   Gait Assistance 5  Supervision   Additional items Assist x 1;Verbal cues   Assistive Device Other (Comment)  (Rollator)   Distance 35'x2   Stair Management Assistance Not tested   Balance   Dynamic Sitting Fair +   Static Standing Fair   Dynamic Standing Fair -   Ambulatory Fair -  (Rollator)   Endurance Deficit   Endurance Deficit Yes   Endurance Deficit Description fatigue, weakness   Activity Tolerance   Activity Tolerance Patient limited by fatigue   Nurse Made Aware Minesh RN; cleared for therapy   Exercises   Knee AROM Long Arc Quad Sitting;10 reps;AROM; Bilateral  (2x10)   Ankle Pumps Sitting;15 reps;AROM; Left;PROM; Right  (2x15; AROM L, PROM R)   Squat Sitting;5 reps;AROM  (3x5 sit<>stand from EOB supervision)   Marching Sitting;10 reps;AROM; Bilateral  (2x10)   Assessment   Prognosis Good   Problem List Decreased strength;Decreased endurance; Impaired balance;Decreased mobility; Decreased coordination;Decreased cognition; Impaired judgement;Decreased safety awareness;Pain   Assessment Eleanor Blanca was seen for a follow up session today; pt reporting fatigue but agreeable to therapy at this time  Instructed pt on seated HEP; pt demonstrate understanding w supervision to complete  Noted decreased R ankle AROM DF (observed pt R ankle DF PROM wfl) during LE TE and ambulation- pt may benefit from AFO due to impact that it has on safe mobility  Pt able to progress amb distance this session however require seated rest break due to decreased endurance  Discussed w pt recommendation for STR at discharge given decreased activity tolerance, decline from baseline fxn and fall risk; pt refusing STR at this time stating "Stop trying to put me in a home!" Attempted to explain to pt multiple times that STR is not a home and instead a temporary facility where he can go to address current problem list and improve mobility for safe and optimal quality of life  Pt refuse teaching with repeated reply "I can rehab myself  Stop trying to put me in a home!" End of session pt repositioned in bed, bed alarm engaged and all needs in reach  Barriers to Discharge Inaccessible home environment;Decreased caregiver support   Barriers to Discharge Comments SHERI, ?level of support at home given decline from baseline fxn   Goals   Patient Goals go home   STG Expiration Date 01/13/20   Short Term Goal #1 1)  Pt will perform bed mobility with Moses demonstrating appropriate technique 100% of the time in order to improve function  2)  Perform all transfers with Moses demonstrating safe and appropriate technique 100% of the time in order to improve ability to negotiate safely in home environment  3) Amb with least restrictive AD > 150'x1 with mod I in order to demonstrate ability to negotiate in home environment  4)  Improve overall strength and balance 1/2 grade in order to optimize ability to perform functional tasks and reduce fall risk  5) Increase activity tolerance to 45 minutes in order to improve endurance to functional tasks  6)  Negotiate stairs using most appropriate technique and S in order to be able to negotiate safely in home environment  7) PT for ongoing patient and family/caregiver education, DME needs and d/c planning in order to promote highest level of function in least restrictive environment  PT Treatment Day 1   Plan   Treatment/Interventions Functional transfer training;Elevations;LE strengthening/ROM; Therapeutic exercise; Endurance training;Cognitive reorientation;Patient/family training;Equipment eval/education; Bed mobility;Gait training;Spoke to nursing;OT   Progress Slow progress, decreased activity tolerance   PT Frequency Other (Comment)  (3-5/wk)   Recommendation   Recommendation Short-term skilled PT   Equipment Recommended Other (Comment)  (AFO for RLE)   PT - OK to Discharge Yes   Additional Comments when medically cleared     Leia Veras, PT

## 2019-12-31 NOTE — ASSESSMENT & PLAN NOTE
Recent Labs     12/29/19  1205 12/30/19  0554 12/31/19  0542   INR 2 25* 2 54* 2 68*    Continue Coumadin at current dosing, monitor INR daily goal INR 2 0 to 3 0 currently therapeutic  Continue Toprol-XL 25 mg daily

## 2019-12-31 NOTE — ASSESSMENT & PLAN NOTE
Nephrology following  BMP in a m   To monitor creatinine and electrolytes  Nephrology evaluating patient's volume status and consider restarting diuretic

## 2020-01-01 LAB
ANION GAP SERPL CALCULATED.3IONS-SCNC: 12 MMOL/L (ref 4–13)
BUN SERPL-MCNC: 63 MG/DL (ref 5–25)
CALCIUM SERPL-MCNC: 9.4 MG/DL (ref 8.3–10.1)
CHLORIDE SERPL-SCNC: 107 MMOL/L (ref 100–108)
CO2 SERPL-SCNC: 21 MMOL/L (ref 21–32)
CREAT SERPL-MCNC: 3.42 MG/DL (ref 0.6–1.3)
GFR SERPL CREATININE-BSD FRML MDRD: 16 ML/MIN/1.73SQ M
GLUCOSE SERPL-MCNC: 95 MG/DL (ref 65–140)
INR PPP: 2.43 (ref 0.84–1.19)
POTASSIUM SERPL-SCNC: 4 MMOL/L (ref 3.5–5.3)
PROTHROMBIN TIME: 26.9 SECONDS (ref 11.6–14.5)
SODIUM SERPL-SCNC: 140 MMOL/L (ref 136–145)

## 2020-01-01 PROCEDURE — 85610 PROTHROMBIN TIME: CPT | Performed by: INTERNAL MEDICINE

## 2020-01-01 PROCEDURE — 99232 SBSQ HOSP IP/OBS MODERATE 35: CPT | Performed by: INTERNAL MEDICINE

## 2020-01-01 PROCEDURE — 99233 SBSQ HOSP IP/OBS HIGH 50: CPT | Performed by: INTERNAL MEDICINE

## 2020-01-01 PROCEDURE — 80048 BASIC METABOLIC PNL TOTAL CA: CPT | Performed by: INTERNAL MEDICINE

## 2020-01-01 PROCEDURE — 97530 THERAPEUTIC ACTIVITIES: CPT

## 2020-01-01 PROCEDURE — 97535 SELF CARE MNGMENT TRAINING: CPT

## 2020-01-01 RX ORDER — DIPHENHYDRAMINE HYDROCHLORIDE 50 MG/ML
25 INJECTION INTRAMUSCULAR; INTRAVENOUS ONCE
Status: DISCONTINUED | OUTPATIENT
Start: 2020-01-01 | End: 2020-01-02 | Stop reason: HOSPADM

## 2020-01-01 RX ADMIN — ISOSORBIDE DINITRATE 20 MG: 10 TABLET ORAL at 13:26

## 2020-01-01 RX ADMIN — PANTOPRAZOLE SODIUM 40 MG: 40 TABLET, DELAYED RELEASE ORAL at 05:59

## 2020-01-01 RX ADMIN — HYDRALAZINE HYDROCHLORIDE 50 MG: 25 TABLET ORAL at 05:59

## 2020-01-01 RX ADMIN — WARFARIN SODIUM 3 MG: 3 TABLET ORAL at 17:46

## 2020-01-01 RX ADMIN — ACETAMINOPHEN 650 MG: 325 TABLET ORAL at 15:05

## 2020-01-01 RX ADMIN — GABAPENTIN 300 MG: 300 CAPSULE ORAL at 08:57

## 2020-01-01 RX ADMIN — HYDRALAZINE HYDROCHLORIDE 50 MG: 25 TABLET ORAL at 22:54

## 2020-01-01 RX ADMIN — TAMSULOSIN HYDROCHLORIDE 0.8 MG: 0.4 CAPSULE ORAL at 17:45

## 2020-01-01 RX ADMIN — ATORVASTATIN CALCIUM 80 MG: 80 TABLET, FILM COATED ORAL at 17:45

## 2020-01-01 NOTE — PROGRESS NOTES
Pt informed RN that he was feeling "itchy on his upper arm and forehead  Pt has visible hives on rue, and a few on lue  Nothing noted on face or trunk  Tongue and oral cavity are patent    V/S Stable  Call bell in hand  [facility]

## 2020-01-01 NOTE — PROGRESS NOTES
NEPHROLOGY PROGRESS NOTE   Renard Sosa [de-identified] y o  male MRN: [id number]  Unit/Bed#: E4 -01 Encounter: [id number]  Reason for Consult: SHAHIDA (POA)    ASSESSMENT/PLAN:  SHAHIDA (POA):  Likely prerenal in the setting of diuretics   Concern for progression of renal disease   -previously dialysis dependent for several months   -presents with sCr of 4 2    - creatinine improved to 3 4    - S/P IVF  - restarted on Lasix 40 mg PO daily    -baseline creatinine 2 8-3 2   -urinalysis bland  -CT scan negative for hydro  -PVR: negative    -good UOP  -avoid nephrotoxins  -I/O      CKD stage IV:  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, arterial nephrosclerosis, and component of cardiorenal syndrome  - will need Op follow up, messaged sent to office staff       Chronic systolic CHF/ischemic cardiomyopathy:  Status post ICD placement   EF 29%   -cardiology following   -Lasix 40 mg po daily, continue to titrate    -outpatient diuretic:  Lasix 40 mg p o  B i d    -maintain fluid restriction   -continue daily weights, I/O      Hypertension:  Blood pressure remains acceptable   -continue metoprolol, hydralazine, and Imdur with holding parameters for systolic blood pressure less than 130   -avoid episodes of hypotension      Abdominal pain:  With elevated lipase   -liver function tests normal       Anemia of chronic disease:   -continue to monitor and transfuse as needed      CKD/MBD:  -continue to follow phosphorus and PTH level as outpatient      History of BPH:  Denies current issues with urination   -continue Flomax  Disposition:  Okay to discharge from Renal with outpatient follow-up  Continue Lasix 40 mg p o  Daily  Will repeat BMP prior to follow-up appointment  Continue all blood pressure medications as above  Message sent office to schedule follow-up  Will sign off from Renal     SUBJECTIVE:  The patient is resting bed  He denies chest pain or shortness of breath  He denies nausea, vomiting, diarrhea  He states that he is tired this morning  He does not feel ready for discharge  He denies any issues with urination  He denies changes his appetite      OBJECTIVE:  Current Weight: Weight - Scale: 73 5 kg (162 lb 0 6 oz)  Vitals:    12/31/19 2300 01/01/20 0556 01/01/20 0600 01/01/20 0700   BP: 118/64 134/76  108/57   BP Location: Right arm   Right arm   Pulse: 75 58  60   Resp: 18   18   Temp: 98 7 °F (37 1 °C)   97 7 °F (36 5 °C)   TempSrc: Temporal   Temporal   SpO2: 95%   97%   Weight:   73 5 kg (162 lb 0 6 oz)    Height:           Intake/Output Summary (Last 24 hours) at 1/1/2020 1022  Last data filed at 1/1/2020 0555  Gross per 24 hour   Intake 240 ml   Output 1000 ml   Net -760 ml     General: NAD  Skin: warm, dry, intact, no rash  HEENT: Moist mucous membranes, sclera anicteric, normocephalic, atraumatic  Neck: No apparent JVD appreciated  Chest:lung sounds clear B/L, on RA   CVS:Regular rate and rhythm, no murmer   Abdomen: Soft, round, non-tender, +BS  Extremities: No B/L LE edema present  Neuro: alert and oriented  Psych: appropriate mood and affect     Medications:    Current Facility-Administered Medications:     acetaminophen (TYLENOL) tablet 650 mg, 650 mg, Oral, Q6H PRN, AUDREY Macario    atorvastatin (LIPITOR) tablet 80 mg, 80 mg, Oral, After Dinner, KEON MacarioNP, 80 mg at 12/31/19 1745    furosemide (LASIX) tablet 40 mg, 40 mg, Oral, Daily, KEON RojasNP, 40 mg at 12/31/19 1205    gabapentin (NEURONTIN) capsule 300 mg, 300 mg, Oral, Daily, AURDEY Macario, 300 mg at 01/01/20 0857    hydrALAZINE (APRESOLINE) tablet 50 mg, 50 mg, Oral, Q8H [facility] Short, CRNP, 50 mg at 01/01/20 0559    isosorbide dinitrate (ISORDIL) tablet 20 mg, 20 mg, Oral, TID after meals, AUDREY Macario, Stopped at 12/31/19 1745    metoprolol succinate (TOPROL-XL) 24 hr tablet 25 mg, 25 mg, Oral, Daily, AUDREY Macario, 25 mg at 12/31/19 0114    pantoprazole (PROTONIX) EC tablet 40 mg, 40 mg, Oral, Daily, Josh Joya AUDREY, 40 mg at 01/01/20 0559    tamsulosin (FLOMAX) capsule 0 8 mg, 0 8 mg, Oral, Daily With Dinner, Josh Joya AUDREY, 0 8 mg at 12/31/19 1744    warfarin (COUMADIN) tablet 3 mg, 3 mg, Oral, Daily (warfarin), Josh JoyaAUDREY, 3 mg at 12/31/19 1856    Laboratory Results:  Results from last 7 days   Lab Units 01/01/20  0553 12/31/19  0542 12/30/19  0554 12/29/19  1110   WBC Thousand/uL  --   --  3 94* 5 39   HEMOGLOBIN g/dL  --   --  9 6* 11 1*   HEMATOCRIT %  --   --  30 1* 36 4*   PLATELETS Thousands/uL  --   --  160 187   POTASSIUM mmol/L 4 0 3 8 3 4* 4 4   CHLORIDE mmol/L 107 106 106 106   CO2 mmol/L 21 22 20* 20*   BUN mg/dL 63* 62* 67* 67*   CREATININE mg/dL 3 42* 3 48* 3 65* 4 28*   CALCIUM mg/dL 9 4 8 9 8 9 9 4   MAGNESIUM mg/dL  --   --   --  2 4

## 2020-01-01 NOTE — ASSESSMENT & PLAN NOTE
· Patient resides with daughter who reports frequent falls recently  His daughter attributes this to dragging his right foot due to right leg pain secondary to vascular disease    · Ambulates with a walker  · PT current recommendation is for home PT  · Patient resistant to skilled nursing/rehab stay

## 2020-01-01 NOTE — ASSESSMENT & PLAN NOTE
Recent Labs     12/30/19  0554 12/31/19  0542 01/01/20  0553   INR 2 54* 2 68* 2 43*   Continue Coumadin 3 mg daily  Goal INR 2 0-3 0  INR in a m    Patient currently therapeutic

## 2020-01-01 NOTE — ASSESSMENT & PLAN NOTE
Wt Readings from Last 3 Encounters:   01/01/20 73 5 kg (162 lb 0 6 oz)   09/23/19 77 1 kg (170 lb)   08/20/19 73 2 kg (161 lb 6 4 oz)   Clinically euvolemic  Patient's diuretic restarted at reduced dosing schedule from 40 mg Lasix b i d  to 40 mg Lasix daily  Monitor ins and outs  Daily weights  2 g daily sodium restriction to diet

## 2020-01-01 NOTE — PLAN OF CARE
Problem: OCCUPATIONAL THERAPY ADULT  Goal: Performs self-care activities at highest level of function for planned discharge setting  See evaluation for individualized goals  Description  Treatment Interventions: ADL retraining, Functional transfer training, UE strengthening/ROM, Endurance training, Cognitive reorientation, Patient/family training, Equipment evaluation/education, Compensatory technique education, Energy conservation, Activityengagement          See flowsheet documentation for full assessment, interventions and recommendations  Outcome: Progressing  Note:   Limitation: Decreased ADL status, Decreased UE strength, Decreased Safe judgement during ADL, Decreased cognition, Decreased endurance, Decreased self-care trans, Decreased high-level ADLs, Visual deficit  Prognosis: Fair, Good  Assessment: Pt was seen for skilled OT with focus on completion of self care tasks, functional transfers, review of fall prevention, walker safety and review of current plan of care  Pt with slow processing noted this tx session  Extended time provided to process commands  Min A overall for functional transfers with cues for safety due to Pt being unfamiliar with his environment  Pt with visual deficits noted warranting need for redirection through out tx session  Min A overall for LE dressing with increased pain noted in L knee  Pt will benefit from continued rehab based on current deficits noted  OT Discharge Recommendation: Short Term Rehab  OT - OK to Discharge:  Yes

## 2020-01-01 NOTE — PLAN OF CARE
Problem: Potential for Falls  Goal: Patient will remain free of falls  Description  INTERVENTIONS:  - Assess patient frequently for physical needs  -  Identify cognitive and physical deficits and behaviors that affect risk of falls    -  [place] fall precautions as indicated by assessment   - Educate patient/family on patient safety including physical limitations  - Instruct patient to call for assistance with activity based on assessment  - Modify environment to reduce risk of injury  - Consider OT/PT consult to assist with strengthening/mobility  Outcome: Progressing     Problem: Prexisting or High Potential for Compromised Skin Integrity  Goal: Skin integrity is maintained or improved  Description  INTERVENTIONS:  - Identify patients at risk for skin breakdown  - Assess and monitor skin integrity  - Assess and monitor nutrition and hydration status  - Monitor labs   - Assess for incontinence   - Turn and reposition patient  - Assist with mobility/ambulation  - Relieve pressure over bony prominences  - Avoid friction and shearing  - Provide appropriate hygiene as needed including keeping skin clean and dry  - Evaluate need for skin moisturizer/barrier cream  - Collaborate with interdisciplinary team   - Patient/family teaching  - Consider wound care consult   Outcome: Progressing     Problem: PAIN - ADULT  Goal: Verbalizes/displays adequate comfort level or baseline comfort level  Description  Interventions:  - Encourage patient to monitor pain and request assistance  - Assess pain using appropriate pain scale  - Administer analgesics based on type and severity of pain and evaluate response  - Implement non-pharmacological measures as appropriate and evaluate response  - Consider cultural and social influences on pain and pain management  - Notify physician/advanced practitioner if interventions unsuccessful or patient reports new pain  Outcome: Progressing     Problem: DISCHARGE PLANNING  Goal: Discharge to home or other facility with appropriate resources  Description  INTERVENTIONS:  - Identify barriers to discharge w/patient and caregiver  - Arrange for needed discharge resources and transportation as appropriate  - Identify discharge learning needs (meds, wound care, etc )  - Arrange for interpretive services to assist at discharge as needed  - Refer to Case Management Department for coordinating discharge planning if the patient needs post-hospital services based on physician/advanced practitioner order or complex needs related to functional status, cognitive ability, or social support system  Outcome: Progressing     Problem: Knowledge Deficit  Goal: Patient/family/caregiver demonstrates understanding of disease process, treatment plan, medications, and discharge instructions  Description  Complete learning assessment and assess knowledge base    Interventions:  - Provide teaching at level of understanding  - Provide teaching via preferred learning methods  Outcome: Progressing     Problem: CARDIOVASCULAR - ADULT  Goal: Maintains optimal cardiac output and hemodynamic stability  Description  INTERVENTIONS:  - Monitor I/O, vital signs and rhythm  - Monitor for S/S and trends of decreased cardiac output  - Administer and titrate ordered vasoactive medications to optimize hemodynamic stability  - Assess quality of pulses, skin color and temperature  - Assess for signs of decreased coronary artery perfusion  - Instruct patient to report change in severity of symptoms  Outcome: Progressing  Goal: Absence of cardiac dysrhythmias or at baseline rhythm  Description  INTERVENTIONS:  - Continuous cardiac monitoring, vital signs, obtain 12 lead EKG if ordered  - Administer antiarrhythmic and heart rate control medications as ordered  - Monitor electrolytes and administer replacement therapy as ordered  Outcome: Progressing     Problem: GENITOURINARY - ADULT  Goal: Maintains or returns to baseline urinary function  Description  INTERVENTIONS:  - Assess urinary function  - Encourage oral fluids to ensure adequate hydration if ordered  - Administer IV fluids as ordered to ensure adequate hydration  - Administer ordered medications as needed  - Offer frequent toileting  - Follow urinary retention protocol if ordered  Outcome: Progressing  Goal: Absence of urinary retention  Description  INTERVENTIONS:  - Assess patients ability to void and empty bladder  - Monitor I/O  - Bladder scan as needed  - Discuss with physician/AP medications to alleviate retention as needed  - Discuss catheterization for long term situations as appropriate  Outcome: Progressing     Problem: METABOLIC, FLUID AND ELECTROLYTES - ADULT  Goal: Electrolytes maintained within normal limits  Description  INTERVENTIONS:  - Monitor labs and assess patient for signs and symptoms of electrolyte imbalances  - Administer electrolyte replacement as ordered  - Monitor response to electrolyte replacements, including repeat lab results as appropriate  - Instruct patient on fluid and nutrition as appropriate  Outcome: Progressing  Goal: Fluid balance maintained  Description  INTERVENTIONS:  - Monitor labs   - Monitor I/O and WT  - Instruct patient on fluid and nutrition as appropriate  - Assess for signs & symptoms of volume excess or deficit  Outcome: Progressing

## 2020-01-01 NOTE — ASSESSMENT & PLAN NOTE
Atypical chest pain  Appreciate cardiology recommendations  No need for inpatient workup   Outpatient followup with cardiologist   Patient with ongoing atypical chest pain

## 2020-01-01 NOTE — OCCUPATIONAL THERAPY NOTE
Occupational Therapy Treatment Note:         01/01/20 1450   Restrictions/Precautions   Weight Bearing Precautions Per Order No   Other Precautions Pain; Fall Risk;Cognitive; Chair Alarm; Bed Alarm;Telemetry   Pain Assessment   Pain Assessment 0-10   Pain Score 5   Pain Location Shoulder   ADL   Where Assessed Edge of bed   Grooming Assistance 5  Supervision/Setup   Grooming Deficit Setup   LB Dressing Assistance 4  Minimal Assistance   LB Dressing Deficit Setup;Steadying; Requires assistive device for steadying; Don/doff L sock; Don/doff R sock; Thread LLE into underwear; Thread RLE into underwear;Pull up over hips   LB Dressing Comments cues for safety required  Toileting Assistance  4  Minimal Assistance   Toileting Deficit Setup;Steadying;Verbal cueing;Supervison/safety; Increased time to complete;Clothing management down;Clothing management up;Perineal hygiene   Functional Standing Tolerance   Time 4 mins   Activity dynamic stand balance activity  Bed Mobility   Supine to Sit 5  Supervision   Additional items Assist x 1   Sit to Supine 4  Minimal assistance   Additional items Assist x 1;Bedrails;Leg ;Verbal cues;LE management   Additional Comments increased A to return to bed  Transfers   Sit to Stand 5  Supervision   Additional items Assist x 1   Stand to Sit 4  Minimal assistance   Additional items Assist x 1   Stand pivot 4  Minimal assistance   Additional items Assist x 1; Increased time required;Verbal cues;Armrests   Toilet transfer 4  Minimal assistance   Additional items Assist x 1   Additional Comments cues for safe navigation of rollator walker  Functional Mobility   Functional Mobility 4  Minimal assistance   Additional Comments x1   Additional items   (rollator walker  )   Cognition   Overall Cognitive Status Impaired   Arousal/Participation Alert; Responsive; Cooperative   Attention Attends with cues to redirect   Orientation Level Oriented to person;Oriented to place; Disoriented to time;Disoriented to situation   Memory Decreased short term memory;Decreased recall of recent events;Decreased recall of precautions   Following Commands Follows multistep commands with increased time or repetition   Additional Activities   Additional Activities Other (Comment)  (reviewed fall prevention)   Additional Activities Comments Pt reports having good understanding  Activity Tolerance   Activity Tolerance Patient limited by pain; Patient limited by fatigue   Medical Staff Made Aware Reported all findings to nursing staff  Assessment   Assessment Pt was seen for skilled OT with focus on completion of self care tasks, functional transfers, review of fall prevention, walker safety and review of current plan of care  Pt with slow processing noted this tx session  Extended time provided to process commands  Min A overall for functional transfers with cues for safety due to Pt being unfamiliar with his environment  Pt with visual deficits noted warranting need for redirection through out tx session  Min A overall for LE dressing with increased pain noted in L knee  Pt will benefit from continued rehab based on current deficits noted  Plan   Treatment Interventions ADL retraining;Functional transfer training;UE strengthening/ROM; Endurance training;Cognitive reorientation   Goal Expiration Date 01/09/20   OT Treatment Day 1   OT Frequency 3-5x/wk   Recommendation   OT Discharge Recommendation Short Term Rehab   OT - OK to Discharge Yes   Barthel Index   Feeding 10   Bathing 0   Grooming Score 5   Dressing Score 5   Bladder Score 10   Bowels Score 10   Toilet Use Score 5   Transfers (Bed/Chair) Score 10   Mobility (Level Surface) Score 0   Stairs Score 0   B[place]hel Index Score 55   Modified [place] Scale   Modified [place] Scale 4   Gabriela Landaverde, [address]

## 2020-01-01 NOTE — PROGRESS NOTES
Progress Note Ralf Han 1940, [de-identified] y o  male MRN: [id number]    Unit/Bed#: E4 -01 Encounter: [id number]    Primary Care Provider: Lorraine Hartley PA-C   Date and time admitted to hospital: 12/29/2019 10:55 AM        Ambulatory dysfunction  Assessment & Plan  · Patient resides with daughter who reports frequent falls recently  His daughter attributes this to dragging his right foot due to right leg pain secondary to vascular disease  · Ambulates with a walker  · PT current recommendation is for home PT  · Patient resistant to skilled nursing/rehab stay    Pulmonary nodule  Assessment & Plan  · CT chest with focal opacity the along the left major fissure not seen previously, possibilities include atelectasis, pneumonia, neoplasm  · Also with 4 mm right apical lung nodule, stable and 3 mm left upper lobe nodule, new  · Recommend follow-up chest CT  · Incidental finding note generated    Chest pain  Assessment & Plan  Atypical chest pain  Appreciate cardiology recommendations  No need for inpatient workup  Outpatient followup with cardiologist   Patient with ongoing atypical chest pain    CKD (chronic kidney disease), stage IV [facility])  Assessment & Plan  Nephrology following  BMP in a m  To monitor creatinine and electrolytes  Diuretic restarted yesterday by Nephrology, monitor ins and outs BMP in a m  To monitor renal function    HTN (hypertension)  Assessment & Plan  Continue home antihypertensives  Monitor blood pressure with routine vitals    Paroxysmal atrial fibrillation [facility])  Assessment & Plan      Recent Labs     12/30/19  0554 12/31/19  0542 01/01/20  0553   INR 2 54* 2 68* 2 43*   Continue Coumadin 3 mg daily  Goal INR 2 0-3 0  INR in a m    Patient currently therapeutic        GERD (gastroesophageal reflux disease)  Assessment & Plan  Continue PPI  Monitor clinically    Chronic systolic congestive heart failure [facility])  Assessment & Plan  Wt Readings from Last 3 Encounters:   01/01/20 73 5 kg (162 lb 0 6 oz)   19 77 1 kg (170 lb)   19 73 2 kg (161 lb 6 4 oz)   Clinically euvolemic  Patient's diuretic restarted at reduced dosing schedule from 40 mg Lasix b i d  to 40 mg Lasix daily  Monitor ins and outs  Daily weights  2 g daily sodium restriction to diet        VTE Pharmacologic Prophylaxis:   Pharmacologic: Warfarin (Coumadin)  Mechanical VTE Prophylaxis in Place: Yes    Patient Centered Rounds:      Discussions with Specialists or Other Care Team Provider:  Discussed case with Nephrology advanced practitioner    Education and Discussions with Family / Patient: At bedside    Time Spent for Care: 30 minutes  More than 50% of total time spent on counseling and coordination of care as described above  Current Length of Stay: 3 day(s)    Current Patient Status: Inpatient   Certification Statement: The patient will continue to require additional inpatient hospital stay due to Renal function monitoring and ins and outs monitoring with restart of diuretic in CKD 4 patient    Discharge Plan:  Current plan will be to discharge patient home in a m  If renal function remains stable on p o  Diuretic with reduced dosing of 40 mg Lasix daily down from b i d  Outpatient dosing  Patient will require close follow-up with Nephrology  Code Status: Level 1 - Full Code      Subjective:   No acute events overnight  This a m  Patient still having intermittent chest pain unchanged from admission, no exacerbating or relieving factors  Patient reports that he is not feeling well this a m  But cannot qualify it further  Denies shortness of breath nausea vomiting diaphoresis  Objective:     Vitals:   Temp (24hrs), Av 2 °F (36 8 °C), Min:97 7 °F (36 5 °C), Max:98 7 °F (37 1 °C)    Temp:  [97 7 °F (36 5 °C)-98 7 °F (37 1 °C)] 97 7 °F (36 5 °C)  HR:  [57-75] 60  Resp:  [16-18] 18  BP: ()/(55-76) 108/57  SpO2:  [95 %-97 %] 97 %  Body mass index is 26 15 kg/m²       Input and Output Summary (last 24 hours): Intake/Output Summary (Last 24 hours) at 1/1/2020 1132  Last data filed at 1/1/2020 0555  Gross per 24 hour   Intake 240 ml   Output 1000 ml   Net -760 ml       Physical Exam:     Physical Exam   Constitutional: He appears well-developed and well-nourished  No distress  HENT:   Head: Normocephalic and atraumatic  Right Ear: External ear normal    Left Ear: External ear normal    Nose: Nose normal    Eyes: Conjunctivae are normal  Right eye exhibits no discharge  Left eye exhibits no discharge  No scleral icterus  Neck: Normal range of motion  No JVD present  No tracheal deviation present  No thyromegaly present  Cardiovascular: Normal rate, regular rhythm and intact distal pulses  Exam reveals no gallop and no friction rub  Murmur (1/6) heard  Pulmonary/Chest: Effort normal and breath sounds normal  No stridor  No respiratory distress  He has no wheezes  He has no rales  Abdominal: Soft  Bowel sounds are normal  He exhibits no distension  There is no tenderness  There is no rebound and no guarding  Musculoskeletal: Normal range of motion  He exhibits no edema, tenderness or deformity  Neurological: He is alert  He has normal reflexes  He exhibits normal muscle tone  Coordination normal    Skin: Skin is warm and dry  No rash noted  He is not diaphoretic  No erythema  No pallor  Psychiatric: He has a normal mood and affect  His behavior is normal  Judgment and thought content normal    Nursing note and vitals reviewed          Additional Data:     Labs:    Results from last 7 days   Lab Units 12/30/19  0554 12/29/19  1110   WBC Thousand/uL 3 94* 5 39   HEMOGLOBIN g/dL 9 6* 11 1*   HEMATOCRIT % 30 1* 36 4*   PLATELETS Thousands/uL 160 187   NEUTROS PCT %  --  67   LYMPHS PCT %  --  20   MONOS PCT %  --  8   EOS PCT %  --  5     Results from last 7 days   Lab Units 01/01/20  0553  12/29/19  1110   SODIUM mmol/L 140   < > 137   POTASSIUM mmol/L 4 0   < > 4 4   CHLORIDE mmol/L 107   < > 106 CO2 mmol/L 21   < > 20*   BUN mg/dL 63*   < > 67*   CREATININE mg/dL 3 42*   < > 4 28*   ANION GAP mmol/L 12   < > 11   CALCIUM mg/dL 9 4   < > 9 4   ALBUMIN g/dL  --   --  3 6   TOTAL BILIRUBIN mg/dL  --   --  0 56   ALK PHOS U/L  --   --  92   ALT U/L  --   --  15   AST U/L  --   --  31   GLUCOSE RANDOM mg/dL 95   < > 99    < > = values in this interval not displayed  Results from last 7 days   Lab Units 01/01/20  0553   INR  2 43*             Results from last 7 days   Lab Units 12/29/19  1433 12/29/19  1133   LACTIC ACID mmol/L 0 7 0 9           * I Have Reviewed All Lab Data Listed Above  * Additional Pertinent Lab Tests Reviewed: Adam 65 Admission Reviewed    Imaging:    Imaging Reports Reviewed Today Include:    Imaging Personally Reviewed by Myself Includes:       Recent Cultures (last 7 days):           Last 24 Hours Medication List:     Current Facility-Administered Medications:  acetaminophen 650 mg Oral Q6H PRN AUDREY Retana   atorvastatin 80 mg Oral After Dinner Devin Cisneros, AUDREY   furosemide 40 mg Oral Daily True Griffin, CRNP   gabapentin 300 mg Oral Daily AUDREY Retana   hydrALAZINE 50 mg Oral [facility] Vonda Arias, CRNORM   isosorbide dinitrate 20 mg Oral TID after meals AUDREY Retana   metoprolol succinate 25 mg Oral Daily AUDREY Retana   pantoprazole 40 mg Oral Daily AUDREY Retana   tamsulosin 0 8 mg Oral Daily With Dinner AUDREY Retana   warfarin 3 mg Oral Daily (warfarin) AUDREY Retana        Today, Patient Was Seen By: Galen Dasilva MD    ** Please Note: Dictation voice to text software may have been used in the creation of this document   **

## 2020-01-01 NOTE — ASSESSMENT & PLAN NOTE
Nephrology following  BMP in a m  To monitor creatinine and electrolytes  Diuretic restarted yesterday by Nephrology, monitor ins and outs BMP in a m   To monitor renal function

## 2020-01-02 ENCOUNTER — HOSPITAL ENCOUNTER (INPATIENT)
Facility: HOSPITAL | Age: 80
LOS: 15 days | Discharge: HOME HEALTH | DRG: 683 | End: 2020-01-17
Attending: FAMILY MEDICINE | Admitting: FAMILY MEDICINE
Payer: MEDICARE

## 2020-01-02 VITALS
BODY MASS INDEX: 26.08 KG/M2 | HEIGHT: 66 IN | WEIGHT: 162.26 LBS | OXYGEN SATURATION: 96 % | HEART RATE: 67 BPM | RESPIRATION RATE: 18 BRPM | TEMPERATURE: 96.3 F | DIASTOLIC BLOOD PRESSURE: 93 MMHG | SYSTOLIC BLOOD PRESSURE: 152 MMHG

## 2020-01-02 DIAGNOSIS — N17.9 ACUTE RENAL FAILURE SUPERIMPOSED ON STAGE 4 CHRONIC KIDNEY DISEASE, UNSPECIFIED ACUTE RENAL FAILURE TYPE (HCC): ICD-10-CM

## 2020-01-02 DIAGNOSIS — I50.22 CHRONIC SYSTOLIC CONGESTIVE HEART FAILURE (HCC): Primary | ICD-10-CM

## 2020-01-02 DIAGNOSIS — N18.4 ACUTE RENAL FAILURE SUPERIMPOSED ON STAGE 4 CHRONIC KIDNEY DISEASE, UNSPECIFIED ACUTE RENAL FAILURE TYPE (HCC): ICD-10-CM

## 2020-01-02 LAB
ANION GAP SERPL CALCULATED.3IONS-SCNC: 10 MMOL/L (ref 4–13)
BASOPHILS # BLD AUTO: 0.02 THOUSANDS/ΜL (ref 0–0.1)
BASOPHILS NFR BLD AUTO: 1 % (ref 0–1)
BUN SERPL-MCNC: 60 MG/DL (ref 5–25)
CALCIUM SERPL-MCNC: 9.4 MG/DL (ref 8.3–10.1)
CHLORIDE SERPL-SCNC: 105 MMOL/L (ref 100–108)
CO2 SERPL-SCNC: 23 MMOL/L (ref 21–32)
CREAT SERPL-MCNC: 3.21 MG/DL (ref 0.6–1.3)
EOSINOPHIL # BLD AUTO: 0.49 THOUSAND/ΜL (ref 0–0.61)
EOSINOPHIL NFR BLD AUTO: 11 % (ref 0–6)
ERYTHROCYTE [DISTWIDTH] IN BLOOD BY AUTOMATED COUNT: 13.3 % (ref 11.6–15.1)
GFR SERPL CREATININE-BSD FRML MDRD: 17 ML/MIN/1.73SQ M
GLUCOSE SERPL-MCNC: 104 MG/DL (ref 65–140)
HCT VFR BLD AUTO: 31.1 % (ref 36.5–49.3)
HGB BLD-MCNC: 9.9 G/DL (ref 12–17)
IMM GRANULOCYTES # BLD AUTO: 0.02 THOUSAND/UL (ref 0–0.2)
IMM GRANULOCYTES NFR BLD AUTO: 1 % (ref 0–2)
INR PPP: 2.34 (ref 0.84–1.19)
LYMPHOCYTES # BLD AUTO: 1.38 THOUSANDS/ΜL (ref 0.6–4.47)
LYMPHOCYTES NFR BLD AUTO: 32 % (ref 14–44)
MCH RBC QN AUTO: 32.2 PG (ref 26.8–34.3)
MCHC RBC AUTO-ENTMCNC: 31.8 G/DL (ref 31.4–37.4)
MCV RBC AUTO: 101 FL (ref 82–98)
MONOCYTES # BLD AUTO: 0.31 THOUSAND/ΜL (ref 0.17–1.22)
MONOCYTES NFR BLD AUTO: 7 % (ref 4–12)
NEUTROPHILS # BLD AUTO: 2.09 THOUSANDS/ΜL (ref 1.85–7.62)
NEUTS SEG NFR BLD AUTO: 48 % (ref 43–75)
NRBC BLD AUTO-RTO: 0 /100 WBCS
PLATELET # BLD AUTO: 165 THOUSANDS/UL (ref 149–390)
PMV BLD AUTO: 9.9 FL (ref 8.9–12.7)
POTASSIUM SERPL-SCNC: 3.9 MMOL/L (ref 3.5–5.3)
PROTHROMBIN TIME: 26.1 SECONDS (ref 11.6–14.5)
RBC # BLD AUTO: 3.07 MILLION/UL (ref 3.88–5.62)
SODIUM SERPL-SCNC: 138 MMOL/L (ref 136–145)
WBC # BLD AUTO: 4.31 THOUSAND/UL (ref 4.31–10.16)

## 2020-01-02 PROCEDURE — 99239 HOSP IP/OBS DSCHRG MGMT >30: CPT | Performed by: STUDENT IN AN ORGANIZED HEALTH CARE EDUCATION/TRAINING PROGRAM

## 2020-01-02 PROCEDURE — 85025 COMPLETE CBC W/AUTO DIFF WBC: CPT | Performed by: INTERNAL MEDICINE

## 2020-01-02 PROCEDURE — 80048 BASIC METABOLIC PNL TOTAL CA: CPT | Performed by: INTERNAL MEDICINE

## 2020-01-02 PROCEDURE — 1124F ACP DISCUSS-NO DSCNMKR DOCD: CPT | Performed by: INTERNAL MEDICINE

## 2020-01-02 PROCEDURE — 85610 PROTHROMBIN TIME: CPT | Performed by: INTERNAL MEDICINE

## 2020-01-02 PROCEDURE — 99232 SBSQ HOSP IP/OBS MODERATE 35: CPT | Performed by: INTERNAL MEDICINE

## 2020-01-02 RX ORDER — FUROSEMIDE 40 MG/1
40 TABLET ORAL DAILY
Status: CANCELLED | OUTPATIENT
Start: 2020-01-03

## 2020-01-02 RX ORDER — ACETAMINOPHEN 325 MG/1
650 TABLET ORAL EVERY 6 HOURS PRN
Status: CANCELLED | OUTPATIENT
Start: 2020-01-02

## 2020-01-02 RX ORDER — METOPROLOL SUCCINATE 25 MG/1
25 TABLET, EXTENDED RELEASE ORAL DAILY
Status: DISCONTINUED | OUTPATIENT
Start: 2020-01-03 | End: 2020-01-04

## 2020-01-02 RX ORDER — PANTOPRAZOLE SODIUM 40 MG/1
40 TABLET, DELAYED RELEASE ORAL DAILY
Status: DISCONTINUED | OUTPATIENT
Start: 2020-01-03 | End: 2020-01-17 | Stop reason: HOSPADM

## 2020-01-02 RX ORDER — HYDRALAZINE HYDROCHLORIDE 25 MG/1
50 TABLET, FILM COATED ORAL EVERY 8 HOURS SCHEDULED
Status: CANCELLED | OUTPATIENT
Start: 2020-01-02

## 2020-01-02 RX ORDER — WARFARIN SODIUM 3 MG/1
3 TABLET ORAL
Status: DISCONTINUED | OUTPATIENT
Start: 2020-01-03 | End: 2020-01-17 | Stop reason: HOSPADM

## 2020-01-02 RX ORDER — GABAPENTIN 300 MG/1
300 CAPSULE ORAL DAILY
Status: DISCONTINUED | OUTPATIENT
Start: 2020-01-03 | End: 2020-01-17 | Stop reason: HOSPADM

## 2020-01-02 RX ORDER — FUROSEMIDE 40 MG/1
40 TABLET ORAL DAILY
Status: DISCONTINUED | OUTPATIENT
Start: 2020-01-03 | End: 2020-01-10

## 2020-01-02 RX ORDER — TAMSULOSIN HYDROCHLORIDE 0.4 MG/1
0.8 CAPSULE ORAL
Status: CANCELLED | OUTPATIENT
Start: 2020-01-02

## 2020-01-02 RX ORDER — HYDRALAZINE HYDROCHLORIDE 25 MG/1
50 TABLET, FILM COATED ORAL EVERY 8 HOURS SCHEDULED
Status: DISCONTINUED | OUTPATIENT
Start: 2020-01-02 | End: 2020-01-07

## 2020-01-02 RX ORDER — GABAPENTIN 300 MG/1
300 CAPSULE ORAL DAILY
Status: CANCELLED | OUTPATIENT
Start: 2020-01-03

## 2020-01-02 RX ORDER — ATORVASTATIN CALCIUM 80 MG/1
80 TABLET, FILM COATED ORAL
Status: DISCONTINUED | OUTPATIENT
Start: 2020-01-03 | End: 2020-01-03

## 2020-01-02 RX ORDER — WARFARIN SODIUM 3 MG/1
3 TABLET ORAL
Status: CANCELLED | OUTPATIENT
Start: 2020-01-02

## 2020-01-02 RX ORDER — DIPHENHYDRAMINE HCL 25 MG
25 TABLET ORAL EVERY 6 HOURS PRN
Status: DISCONTINUED | OUTPATIENT
Start: 2020-01-02 | End: 2020-01-03

## 2020-01-02 RX ORDER — TAMSULOSIN HYDROCHLORIDE 0.4 MG/1
0.8 CAPSULE ORAL
Status: DISCONTINUED | OUTPATIENT
Start: 2020-01-03 | End: 2020-01-17 | Stop reason: HOSPADM

## 2020-01-02 RX ORDER — SENNOSIDES 8.6 MG
650 CAPSULE ORAL EVERY 8 HOURS PRN
COMMUNITY

## 2020-01-02 RX ORDER — FUROSEMIDE 40 MG/1
40 TABLET ORAL DAILY
Refills: 0
Start: 2020-01-02

## 2020-01-02 RX ORDER — ATORVASTATIN CALCIUM 80 MG/1
80 TABLET, FILM COATED ORAL
Status: CANCELLED | OUTPATIENT
Start: 2020-01-02

## 2020-01-02 RX ORDER — METOPROLOL SUCCINATE 25 MG/1
25 TABLET, EXTENDED RELEASE ORAL DAILY
Status: CANCELLED | OUTPATIENT
Start: 2020-01-03

## 2020-01-02 RX ORDER — DIPHENHYDRAMINE HCL 25 MG
25 TABLET ORAL EVERY 6 HOURS PRN
Status: DISCONTINUED | OUTPATIENT
Start: 2020-01-02 | End: 2020-01-02 | Stop reason: HOSPADM

## 2020-01-02 RX ORDER — ISOSORBIDE DINITRATE 10 MG/1
20 TABLET ORAL
Status: CANCELLED | OUTPATIENT
Start: 2020-01-02

## 2020-01-02 RX ORDER — ISOSORBIDE DINITRATE 10 MG/1
20 TABLET ORAL
Status: DISCONTINUED | OUTPATIENT
Start: 2020-01-02 | End: 2020-01-04

## 2020-01-02 RX ORDER — DIPHENHYDRAMINE HCL 25 MG
25 TABLET ORAL EVERY 6 HOURS PRN
Status: CANCELLED | OUTPATIENT
Start: 2020-01-02

## 2020-01-02 RX ORDER — ACETAMINOPHEN 325 MG/1
650 TABLET ORAL EVERY 6 HOURS PRN
Status: DISCONTINUED | OUTPATIENT
Start: 2020-01-02 | End: 2020-01-17 | Stop reason: HOSPADM

## 2020-01-02 RX ORDER — PANTOPRAZOLE SODIUM 40 MG/1
40 TABLET, DELAYED RELEASE ORAL DAILY
Status: CANCELLED | OUTPATIENT
Start: 2020-01-03

## 2020-01-02 RX ADMIN — PANTOPRAZOLE SODIUM 40 MG: 40 TABLET, DELAYED RELEASE ORAL at 05:21

## 2020-01-02 RX ADMIN — DIPHENHYDRAMINE HCL 25 MG: 25 TABLET ORAL at 11:14

## 2020-01-02 RX ADMIN — METOPROLOL SUCCINATE 25 MG: 25 TABLET, EXTENDED RELEASE ORAL at 08:53

## 2020-01-02 RX ADMIN — GABAPENTIN 300 MG: 300 CAPSULE ORAL at 08:52

## 2020-01-02 RX ADMIN — ISOSORBIDE DINITRATE 20 MG: 10 TABLET ORAL at 17:43

## 2020-01-02 RX ADMIN — HYDRALAZINE HYDROCHLORIDE 50 MG: 25 TABLET ORAL at 17:44

## 2020-01-02 RX ADMIN — ISOSORBIDE DINITRATE 20 MG: 10 TABLET ORAL at 08:53

## 2020-01-02 RX ADMIN — HYDRALAZINE HYDROCHLORIDE 50 MG: 25 TABLET ORAL at 05:21

## 2020-01-02 RX ADMIN — WARFARIN SODIUM 3 MG: 3 TABLET ORAL at 17:44

## 2020-01-02 RX ADMIN — FUROSEMIDE 40 MG: 40 TABLET ORAL at 08:53

## 2020-01-02 RX ADMIN — ATORVASTATIN CALCIUM 80 MG: 80 TABLET, FILM COATED ORAL at 17:44

## 2020-01-02 RX ADMIN — TAMSULOSIN HYDROCHLORIDE 0.8 MG: 0.4 CAPSULE ORAL at 17:44

## 2020-01-02 NOTE — ASSESSMENT & PLAN NOTE
Recent Labs     12/31/19  0542 01/01/20  0553 01/02/20  0518   INR 2 68* 2 43* 2 34*     Continue current rate control and anticoagulation regimen  Goal INR 2 0-3 0  INR in a m    Patient currently therapeutic

## 2020-01-02 NOTE — ASSESSMENT & PLAN NOTE
Atypical chest pain  - Appreciate cardiology recommendations  No inpatient workup needed    - Serial troponins negative

## 2020-01-02 NOTE — PROGRESS NOTES
NEPHROLOGY PROGRESS NOTE   Nina Rome [de-identified] y o  male MRN: [id number]  Unit/Bed#: E4 -01 Encounter: [id number]  Reason for Consult: SHAHIDA/CKD    Nina Rome reason 79-year-old male with past medical history of Chronic Kidney Disease IV, previous temporary dialysis in 2016, hypertension, CRS, DVT status post IVC filter, CVA, BPH, CAD status post CABG in 2016 who was admitted with complaints of chest pain and abdominal pain and Nephrology was consulted for CARTER WILLIS Chronic Kidney Disease  ASSESSMENT/PLAN:  1  Acute kidney injury POA):  Suspect prerenal in the setting of diuretics along with progression of renal disease  -peak creatinine at 4 2 and current creatinine 3 21-baseline  -initially treated with IV fluids  -oral diuretics restarted with Lasix 40 mg daily  -avoid nephrotoxins  -avoid hypotension  -urinalysis was essentially bland  -CT scan was negative for hydronephrosis  -PVR was negative  -continue to trend I/O, lab values in volume status if remains hospitalized    2  Chronic kidney disease IV:  Suspect secondary to hypertensive nephrosclerosis, arterial nephrosclerosis and component of cardiorenal syndrome along with age-related nephron loss in the setting of decreased EF  -baseline creatinine 2 8-3 2  -previously seen as a hospital follow-up for CARTER WILLIS-has appointment to follow Dr Silveira in the [facility] office on 02/06/2019 for ongoing Chronic Kidney Disease management  -previously followed nephrologist in [facility], was early    1  Chronic systolic CHF/ischemic cardiomyopathy:  Status post ICD placement for EF of 29%  -cardiology following  -restarted on Lasix 40 mg daily (previous home dose Lasix 40 mg p o  B i d )  -continue with fluid restriction of 1500 mL per day  -continue to trend I/O, lab values in volume status  -current weight 73 6 kg  -I&O not well documented    4   Hypertension:  BP acceptable  -currently on metoprolol, hydralazine, Lasix and Imdur with hold parameters  -avoid episodes of hypotension    5  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:   -will continue to trend PTH and phosphorus as outpatient  -as above has follow-up with Dr Silveira on 02/06/2019    6  Anemia of Chronic Kidney Disease:   -hemoglobin 9 9 appears stable  -last iron stores on 07/18/2019 revealed iron 69, iron saturation 35, TIBC 198, folate 9 7  -will continue to trend    7  Abdominal pain:  Improved per patient report    8  Rash:  Patient complaining of pruritus  -discussed with primary team today, per discussion primary to administer Benadryl      Disposition:  Renal function stable and at baseline  Okay for discharge from a renal standpoint  Will see patient on 01/15/2019 at 8:30 a m  For hospital follow-up    SUBJECTIVE:  Patient seen and examined  Denies chest pain shortness of breath  Patient reports having hives and required and feels itchy       OBJECTIVE:  Current Weight: Weight - Scale: 73 6 kg (162 lb 4 1 oz)  Vitals:    01/01/20 2311 01/02/20 0521 01/02/20 0551 01/02/20 0700   BP: 106/59 131/68  128/58   BP Location: Right arm   Right arm   Pulse: 61   66   Resp: 18   20   Temp: 98 °F (36 7 °C)   98 5 °F (36 9 °C)   TempSrc: Tympanic   Tympanic   SpO2: 97%   95%   Weight:   73 6 kg (162 lb 4 1 oz)    Height:           Intake/Output Summary (Last 24 hours) at 1/2/2020 1027  Last data filed at 1/2/2020 4072  Gross per 24 hour   Intake 270 ml   Output 475 ml   Net -205 ml     General:  No acute distress, chronically ill-appearing, lying flat  Skin:  Warm and dry   Eyes:  Sclera anicteric  ENMT:  Mucous membranes moist, tongue midline  Neck:  Supple without JVD noted  Respiratory:  Clear on auscultation without crackles, rhonchi, wheezes  Cardiac:  Regular rate and rhythm without rub  Extremities:  No significant edema noted bilaterally  GI:  Soft, nontender, no distention, active bowel sounds  Neuro:  Alert and awake  Psych:  Appropriate affect    Medications:    Current Facility-Administered Medications:     acetaminophen (TYLENOL) tablet 650 mg, 650 mg, Oral, Q6H PRN, Mustapha Billet, CRNP, 650 mg at 01/01/20 1505    atorvastatin (LIPITOR) tablet 80 mg, 80 mg, Oral, After Dinner, Mustapha Billet, CRNP, 80 mg at 01/01/20 1745    diphenhydrAMINE (BENADRYL) injection 25 mg, 25 mg, Intravenous, Once, Barbara Victoria MD    diphenhydrAMINE (BENADRYL) tablet 25 mg, 25 mg, Oral, Q6H PRN, Edith Landis MD    furosemide (LASIX) tablet 40 mg, 40 mg, Oral, Daily, Margie Ramey, CRNP, 40 mg at 01/02/20 4708    gabapentin (NEURONTIN) capsule 300 mg, 300 mg, Oral, Daily, Mustapha Billet, CRNP, 300 mg at 01/02/20 3913    hydrALAZINE (APRESOLINE) tablet 50 mg, 50 mg, Oral, Q8H [address], Roselia Clinton, CRNP, 50 mg at 01/02/20 7401    isosorbide dinitrate (ISORDIL) tablet 20 mg, 20 mg, Oral, TID after meals, Mustapha Billet, CRNP, 20 mg at 01/02/20 8512    metoprolol succinate (TOPROL-XL) 24 hr tablet 25 mg, 25 mg, Oral, Daily, Mustapha Billet, CRNP, 25 mg at 01/02/20 2212    pantoprazole (PROTONIX) EC tablet 40 mg, 40 mg, Oral, Daily, Mustapha Billet, CRNP, 40 mg at 01/02/20 4182    tamsulosin (FLOMAX) capsule 0 8 mg, 0 8 mg, Oral, Daily With Dinner, Mustapha Billet, CRNP, 0 8 mg at 01/01/20 1745    warfarin (COUMADIN) tablet 3 mg, 3 mg, Oral, Daily (warfarin), Mustapha Billet, CRNP, 3 mg at 01/01/20 1746    Laboratory Results:  Results from last 7 days   Lab Units 01/02/20  0510 01/01/20  0553 12/31/19  0542 12/30/19  0554 12/29/19  1110   WBC Thousand/uL 4 31  --   --  3 94* 5 39   HEMOGLOBIN g/dL 9 9*  --   --  9 6* 11 1*   HEMATOCRIT % 31 1*  --   --  30 1* 36 4*   PLATELETS Thousands/uL 165  --   --  160 187   POTASSIUM mmol/L 3 9 4 0 3 8 3 4* 4 4   CHLORIDE mmol/L 105 107 106 106 106   CO2 mmol/L 23 21 22 20* 20*   BUN mg/dL 60* 63* 62* 67* 67*   CREATININE mg/dL 3 21* 3 42* 3 48* 3 65* 4 28*   CALCIUM mg/dL 9 4 9 4 8 9 8 9 9 4   MAGNESIUM mg/dL  --   --   --   --  2 4

## 2020-01-02 NOTE — ASSESSMENT & PLAN NOTE
79-year-old male with a past medical history of CAD status post CABG, ischemic cardiomyopathy status post ICD (EF 29%), PAF on Coumadin, admitted due to intermittent chest pain over the past week in the setting acute renal failure superimposed stage IV CKD  Chest pain resolved  Acute renal failure resolved and is currently back at baseline   - Decrease lasix 40mg BID to once daily to avoid overdiuresis      Recent Labs     12/31/19  0542 01/01/20  0553 01/02/20  0510   BUN 62* 63* 60*   CREATININE 3 48* 3 42* 3 21*

## 2020-01-02 NOTE — NURSING NOTE
Patient complaining of a itching rash  Small bumps cover his right and left arm, his abdomen, as well as his neck  No other significant findings upon further examination  Physician was notified  Will continue to monitor condition

## 2020-01-02 NOTE — ASSESSMENT & PLAN NOTE
CT chest with focal opacity the along the left major fissure not seen previously, possibilities include atelectasis, pneumonia, neoplasm  · Recommend follow-up chest CT  · Incidental finding note generated

## 2020-01-02 NOTE — SOCIAL WORK
Pt is accepted at [facility] for STR  Cm spoke with daughter and she is agreeable to have pt go to [facility] for rehab  Pt will need [facility] transport, discussed copay and daughter agrees  Cm notified MD  Transport is set with [facility] transport for 6pm p/u  Cm updated facility, RN and family

## 2020-01-02 NOTE — ASSESSMENT & PLAN NOTE
Wt Readings from Last 3 Encounters:   01/02/20 73 6 kg (162 lb 4 1 oz)   09/23/19 77 1 kg (170 lb)   08/20/19 73 2 kg (161 lb 6 4 oz)     Euvolemic  Continue home medications  Decreased lasix to once daily dosing  Limit sodium intake to <2G per day  Limit fluid intake to less than 48 ounces per day

## 2020-01-02 NOTE — CASE MANAGEMENT
CM called the patient's daughter, Bertin Duarte, after the patient requested to have this CM speak with her about his Medicare Rights  A signed copy of the Medicare Rights were left for Bertin Duarte in the patient's room, since she stated she wanted to read them over and would be in to see him shortly  Another copy of the Medicare Rights were placed in the scan bin to be scanned into his chart

## 2020-01-02 NOTE — PLAN OF CARE
Problem: Potential for Falls  Goal: Patient will remain free of falls  Description  INTERVENTIONS:  - Assess patient frequently for physical needs  -  Identify cognitive and physical deficits and behaviors that affect risk of falls    -  [place] fall precautions as indicated by assessment   - Educate patient/family on patient safety including physical limitations  - Instruct patient to call for assistance with activity based on assessment  - Modify environment to reduce risk of injury  - Consider OT/PT consult to assist with strengthening/mobility  Outcome: Progressing     Problem: Prexisting or High Potential for Compromised Skin Integrity  Goal: Skin integrity is maintained or improved  Description  INTERVENTIONS:  - Identify patients at risk for skin breakdown  - Assess and monitor skin integrity  - Assess and monitor nutrition and hydration status  - Monitor labs   - Assess for incontinence   - Turn and reposition patient  - Assist with mobility/ambulation  - Relieve pressure over bony prominences  - Avoid friction and shearing  - Provide appropriate hygiene as needed including keeping skin clean and dry  - Evaluate need for skin moisturizer/barrier cream  - Collaborate with interdisciplinary team   - Patient/family teaching  - Consider wound care consult   Outcome: Progressing     Problem: PAIN - ADULT  Goal: Verbalizes/displays adequate comfort level or baseline comfort level  Description  Interventions:  - Encourage patient to monitor pain and request assistance  - Assess pain using appropriate pain scale  - Administer analgesics based on type and severity of pain and evaluate response  - Implement non-pharmacological measures as appropriate and evaluate response  - Consider cultural and social influences on pain and pain management  - Notify physician/advanced practitioner if interventions unsuccessful or patient reports new pain  Outcome: Progressing     Problem: DISCHARGE PLANNING  Goal: Discharge to home or other facility with appropriate resources  Description  INTERVENTIONS:  - Identify barriers to discharge w/patient and caregiver  - Arrange for needed discharge resources and transportation as appropriate  - Identify discharge learning needs (meds, wound care, etc )  - Arrange for interpretive services to assist at discharge as needed  - Refer to Case Management Department for coordinating discharge planning if the patient needs post-hospital services based on physician/advanced practitioner order or complex needs related to functional status, cognitive ability, or social support system  Outcome: Progressing     Problem: Knowledge Deficit  Goal: Patient/family/caregiver demonstrates understanding of disease process, treatment plan, medications, and discharge instructions  Description  Complete learning assessment and assess knowledge base    Interventions:  - Provide teaching at level of understanding  - Provide teaching via preferred learning methods  Outcome: Progressing     Problem: CARDIOVASCULAR - ADULT  Goal: Maintains optimal cardiac output and hemodynamic stability  Description  INTERVENTIONS:  - Monitor I/O, vital signs and rhythm  - Monitor for S/S and trends of decreased cardiac output  - Administer and titrate ordered vasoactive medications to optimize hemodynamic stability  - Assess quality of pulses, skin color and temperature  - Assess for signs of decreased coronary artery perfusion  - Instruct patient to report change in severity of symptoms  Outcome: Progressing  Goal: Absence of cardiac dysrhythmias or at baseline rhythm  Description  INTERVENTIONS:  - Continuous cardiac monitoring, vital signs, obtain 12 lead EKG if ordered  - Administer antiarrhythmic and heart rate control medications as ordered  - Monitor electrolytes and administer replacement therapy as ordered  Outcome: Progressing     Problem: GENITOURINARY - ADULT  Goal: Maintains or returns to baseline urinary function  Description  INTERVENTIONS:  - Assess urinary function  - Encourage oral fluids to ensure adequate hydration if ordered  - Administer IV fluids as ordered to ensure adequate hydration  - Administer ordered medications as needed  - Offer frequent toileting  - Follow urinary retention protocol if ordered  Outcome: Progressing  Goal: Absence of urinary retention  Description  INTERVENTIONS:  - Assess patients ability to void and empty bladder  - Monitor I/O  - Bladder scan as needed  - Discuss with physician/AP medications to alleviate retention as needed  - Discuss catheterization for long term situations as appropriate  Outcome: Progressing     Problem: METABOLIC, FLUID AND ELECTROLYTES - ADULT  Goal: Electrolytes maintained within normal limits  Description  INTERVENTIONS:  - Monitor labs and assess patient for signs and symptoms of electrolyte imbalances  - Administer electrolyte replacement as ordered  - Monitor response to electrolyte replacements, including repeat lab results as appropriate  - Instruct patient on fluid and nutrition as appropriate  Outcome: Progressing  Goal: Fluid balance maintained  Description  INTERVENTIONS:  - Monitor labs   - Monitor I/O and WT  - Instruct patient on fluid and nutrition as appropriate  - Assess for signs & symptoms of volume excess or deficit  Outcome: Progressing

## 2020-01-02 NOTE — ASSESSMENT & PLAN NOTE
S/p angioplasty in right leg  - Continue Vascular followup through [facility]  - Chronic pain in right leg with foot drop

## 2020-01-02 NOTE — PROGRESS NOTES
Pt continues to have complaints of hives and itching all night long  Pt currently has no complaints of itchiness this am   Pt has call bell in hand    M

## 2020-01-02 NOTE — ASSESSMENT & PLAN NOTE
Patient not interested in skilled nursing and rehab  To be discharged for home with PT  Ambulates with a walker

## 2020-01-02 NOTE — DISCHARGE SUMMARY
Discharge- Bulmaro Bean 1940, [de-identified] y o  male MRN: [id number]    Unit/Bed#: E4 -01 Encounter: [id number]    Primary Care Provider: Lorraine Hartley PA-C   Date and time admitted to hospital: 12/29/2019 10:55 AM        * Acute renal failure superimposed on stage 4 chronic kidney disease ([address] )  Assessment & Plan  14-year-old male with a past medical history of CAD status post CABG, ischemic cardiomyopathy status post ICD (EF 29%), PAF on Coumadin, admitted due to intermittent chest pain over the past week in the setting acute renal failure superimposed stage IV CKD  Chest pain resolved  Acute renal failure resolved and is currently back at baseline   - Decrease lasix 40mg BID to once daily to avoid overdiuresis  Recent Labs     12/31/19  0542 01/01/20  0553 01/02/20  0510   BUN 62* 63* 60*   CREATININE 3 48* 3 42* 3 21*         Chronic systolic congestive heart failure (HCC)  Assessment & Plan  Wt Readings from Last 3 Encounters:   01/02/20 73 6 kg (162 lb 4 1 oz)   09/23/19 77 1 kg (170 lb)   08/20/19 73 2 kg (161 lb 6 4 oz)     Euvolemic  Continue home medications  Decreased lasix to once daily dosing  Limit sodium intake to <2G per day  Limit fluid intake to less than 48 ounces per day  Chest pain  Assessment & Plan  Atypical chest pain  - Appreciate cardiology recommendations  No inpatient workup needed  - Serial troponins negative          Paroxysmal atrial fibrillation [facility])  Assessment & Plan      Recent Labs     12/31/19  0542 01/01/20  0553 01/02/20  0518   INR 2 68* 2 43* 2 34*     Continue current rate control and anticoagulation regimen  Goal INR 2 0-3 0  INR in a m  Patient currently therapeutic        Ambulatory dysfunction  Assessment & Plan  Patient not interested in skilled nursing and rehab  To be discharged for home with PT  Ambulates with a walker      Pulmonary nodule  Assessment & Plan  CT chest with focal opacity the along the left major fissure not seen previously, possibilities include atelectasis, pneumonia, neoplasm  · Recommend follow-up chest CT  · Incidental finding note generated    PAD (peripheral artery disease) ([facility] [address] )  Assessment & Plan  S/p angioplasty in right leg  - Continue Vascular followup through [facility]  - Chronic pain in right leg with foot drop    HTN (hypertension)  Assessment & Plan  Continue home medications    GERD (gastroesophageal reflux disease)  Assessment & Plan  Continue current medications      Discharging Physician / Practitioner: Alyson Martinez MD  PCP: Penelope Capone PA-C  Admission Date:   Admission Orders (From admission, onward)     Ordered        12/29/19 1300  Inpatient Admission  Once                   Discharge Date: 01/02/20    Resolved Problems  Date Reviewed: 1/2/2020    None          Consultations During Hospital Stay:  · Cardiology, Nephrology    Procedures Performed:   · None    Significant Findings / Test Results:   · XR Chest pa & Lateral    Cardiomediastinal silhouette appears enlarged  A median sternotomy has been performed  A defibrillator enters on the left  The pulmonary vessels are normal      The right lung is clear  There is left pleural fluid and/or thickening  Parenchymal disease is laterally in the left lower lung zone may represent infiltrate or atelectasis  Neoplasia not excluded      Osseous structures appear within normal limits for patient age      IMPRESSION:      1  Postoperative changes  2   Pleural and parenchymal disease at the left lung base  See the subsequently obtained CT scan of the chest      · CT Head wo contrast    PARENCHYMA: Decreased attenuation is noted in periventricular and subcortical white matter demonstrating an appearance that is statistically most likely to represent mild microangiopathic change      No CT signs of acute infarction  No intracranial mass, mass effect or midline shift    No acute parenchymal hemorrhage      VENTRICLES AND EXTRA-AXIAL SPACES:  Normal for the patient's age      VISUALIZED ORBITS AND PARANASAL SINUSES:  No acute abnormality involving the orbits  Mild scattered sinus mucosal thickening is noted  No fluid levels are seen      CALVARIUM AND EXTRACRANIAL SOFT TISSUES:  Normal      IMPRESSION:     No acute intracranial abnormality  Microangiopathic changes  · CT Chest abdomen pelvis    CHEST     LUNGS:  There is left basilar density along the pleural effusion which likely represents atelectasis  This was described on the previous CT  There is opacity along the left major fissure of uncertain etiology and chronicity  There is a 4 mm right   upper lobe nodule on series 2, image 11  This was described previously  There is a 3 mm left upper lobe nodule on series 3, image 54 which was not described previously  There is no tracheal or endobronchial lesion      PLEURA:  There is a small to moderate left pleural effusion appears loculated anteriorly      HEART/GREAT VESSELS:  Unremarkable for patient's age      MEDIASTINUM AND FAYE:  Unremarkable      CHEST WALL AND LOWER NECK:   Unremarkable      ABDOMEN     LIVER/BILIARY TREE:  Unremarkable      GALLBLADDER:  No calcified gallstones  No pericholecystic inflammatory change      SPLEEN:  Unremarkable      PANCREAS:  Unremarkable      ADRENAL GLANDS:  There are 2 1 x 2 2 cm and 1 1 x 1 4 cm right adrenal adenomata  These were described previously      KIDNEYS/URETERS:  Unremarkable  No hydronephrosis      STOMACH AND BOWEL:  Unremarkable      APPENDIX:  No findings to suggest appendicitis      ABDOMINOPELVIC CAVITY:  No ascites or free intraperitoneal air  No lymphadenopathy      VESSELS:  There is a 4 0 cm infrarenal abdominal aortic aneurysm    This was described previously measuring 4 1 cm      PELVIS     REPRODUCTIVE ORGANS:  The prostate is enlarged      URINARY BLADDER:  Unremarkable      ABDOMINAL WALL/INGUINAL REGIONS:  Unremarkable      OSSEOUS STRUCTURES:  No acute fracture or destructive osseous lesion      IMPRESSION:     1   Partially loculated left pleural effusion, described on the previous study and likely chronic  Overlying opacity was also described previously and most consistent with atelectasis or scarring      2  Focal opacity along the left major fissure, not specifically described on the previous study though chronicity is uncertain  Possibilities include additional atelectasis, pneumonia, and neoplasm  The prior CT will be obtained for comparison and an   addendum will be issued      3   4 mm right apical lung nodule, presumably stable from the prior CT  A 3 mm left upper lobe nodule was not described previously  Based on current Fleischner Society 2017 Guidelines on incidental pulmonary nodule, no routine follow-up is needed if the   patient is considered low risk for lung cancer  If the patient is considered high risk for lung cancer, 12 month follow-up non-contrast chest CT is recommended      4  No acute abnormality in the abdomen or pelvis      5   4 cm infrarenal abdominal aortic aneurysm, unchanged  Incidental Findings:   · Partially loculated pleural effusion, described on the previous study and likely chronic  · 4mm right apical lung nodule, presumable stable from the prior CT  3mm left upper lobe nodule NEW  12 month non-contrast chest CT  · 4cm infrarenal AAA    Test Results Pending at Discharge (will require follow up): · None     Outpatient Tests Requested:  · BMP  · PT INR until stable with current regimen for INR 2-3  · 12 month non-contrast chest CT    Complications:  None    Reason for Admission: Chest pain and acute renal failure    Hospital Course:     Maya Ansari is a [de-identified] y o  male patient who originally presented to the hospital on 12/29/2019 due to chest pain and acute renal failure      79-year-old male with past medical history of CAD status post CABG, ischemic cardiomyopathy, chronic systolic heart failure, atrial fibrillation on Coumadin, and hypertension was admitted due to intermittent chest pain and acute renal failure  On further query, chest pain was atypical in nature  Serial troponins were negative  EKG showing dual paced rhythm with no acute ischemia  Cardiology evaluated the patient with determined no need for further inpatient workup  With regards to his acute renal failure, there is some suspicion that this is due to dehydration in the setting of diuretic use  Nephrology was consulted who recommended holding of diuretics during the or the course of his admission  He slowly improved and currently is back at baseline  Diuretics was re-initiated but decrease to once daily  Physical therapy evaluated the patient  However, patient not interested in going to a nursing home and would rather go home with PT instead  He continued to remain hemodynamically stable and was discharged after he was cleared by renal and Cardiology  Would request outpatient follow-up with his primary care provider within a week for repeat BMP monitoring due to his SHAHIDA and possible 12 month noncontrast CT scan of the chest to further monitor his lung nodules if indicated  Of note, spoke to his daughter Salas Harmon and updated her about his current treatment plan  In the both understand to seek immediate medical attention should he develop any worsening chest pain, shortness of breath, fever, chills, inability to urinate, or any other concerning symptoms  No further changes with his medication regimen was done except for the decrease of his Lasix from twice a day dosing to once a day dosing  Of note, he had a 'partially loculated left pleural effusion, described on the previous study and likely chronic' in his CT scan  no evidence of infection at this time  Defer to receiving facility and PCP to consider this as a differential if patient becomes septic  Patient's family member made aware of this      Please see above list of diagnoses and related plan for additional information  Condition at Discharge: fair     Discharge Day Visit / Exam:     Subjective:  Patient seen and examined at bedside  No acute events or complaints overnight  Patient has been complaining about this rash which was present on admission  Benadryl was given  No evidence of airway compromise or wheezing  Vitals: Blood Pressure: 128/58 (01/02/20 0700)  Pulse: 66 (01/02/20 0700)  Temperature: 98 5 °F (36 9 °C) (01/02/20 0700)  Temp Source: Tympanic (01/02/20 0700)  Respirations: 20 (01/02/20 0700)  Height: 5' 6" (167 6 cm) (12/29/19 1410)  Weight - Scale: 73 6 kg (162 lb 4 1 oz) (01/02/20 0551)  SpO2: 95 % (01/02/20 0700)  Exam:   Physical Exam   Constitutional: No distress  Elderly male   HENT:   Head: Normocephalic and atraumatic  Eyes: Pupils are equal, round, and reactive to light  EOM are normal    Neck: Neck supple  Cardiovascular: Normal rate and intact distal pulses  Pulmonary/Chest: Effort normal and breath sounds normal    Abdominal: Soft  Bowel sounds are normal  There is no tenderness  There is no guarding  Musculoskeletal: He exhibits no edema  Neurological: He is alert  Skin: Skin is warm and dry  Psychiatric: He has a normal mood and affect  Discussion with Family: Daughter    Discharge instructions/Information to patient and family:   See after visit summary for information provided to patient and family  Provisions for Follow-Up Care:  See after visit summary for information related to follow-up care and any pertinent home health orders  Disposition:     [facility]:   · Not Applicable to this Patient - Not Applicable to this Patient    Planned Readmission: No     Discharge Statement:  I spent 57 minutes discharging the patient  This time was spent on the day of discharge  I had direct contact with the patient on the day of discharge   Greater than 50% of the total time was spent examining patient, answering all patient questions, arranging and discussing plan of care with patient as well as directly providing post-discharge instructions  Additional time then spent on discharge activities  Discharge Medications:  See after visit summary for reconciled discharge medications provided to patient and family        ** Please Note: This note has been constructed using a voice recognition system **

## 2020-01-02 NOTE — PROGRESS NOTES
RN call receiving facility and spoke with Claybon Opitz   Rn gave report and informed pt will be coming over to them in a wheelchair Andres Gonzáles and that pt has cell phone in right shirt pocket and his wallet is in his left pocket his other belongings will be his rollator and soaps etc from hospital admission     RN reported pts visual deficits and forgetfulness  RN reported pt's c/o itching and the prn dose of benedryl  RN reported pts admitting dx code status   RN discussed labs  RN reported pt is still in room awaiting wcv

## 2020-01-02 NOTE — PLAN OF CARE
Problem: Potential for Falls  Goal: Patient will remain free of falls  Description  INTERVENTIONS:  - Assess patient frequently for physical needs  -  Identify cognitive and physical deficits and behaviors that affect risk of falls    -  [place] fall precautions as indicated by assessment   - Educate patient/family on patient safety including physical limitations  - Instruct patient to call for assistance with activity based on assessment  - Modify environment to reduce risk of injury  - Consider OT/PT consult to assist with strengthening/mobility  Outcome: Progressing     Problem: Prexisting or High Potential for Compromised Skin Integrity  Goal: Skin integrity is maintained or improved  Description  INTERVENTIONS:  - Identify patients at risk for skin breakdown  - Assess and monitor skin integrity  - Assess and monitor nutrition and hydration status  - Monitor labs   - Assess for incontinence   - Turn and reposition patient  - Assist with mobility/ambulation  - Relieve pressure over bony prominences  - Avoid friction and shearing  - Provide appropriate hygiene as needed including keeping skin clean and dry  - Evaluate need for skin moisturizer/barrier cream  - Collaborate with interdisciplinary team   - Patient/family teaching  - Consider wound care consult   Outcome: Progressing     Problem: PAIN - ADULT  Goal: Verbalizes/displays adequate comfort level or baseline comfort level  Description  Interventions:  - Encourage patient to monitor pain and request assistance  - Assess pain using appropriate pain scale  - Administer analgesics based on type and severity of pain and evaluate response  - Implement non-pharmacological measures as appropriate and evaluate response  - Consider cultural and social influences on pain and pain management  - Notify physician/advanced practitioner if interventions unsuccessful or patient reports new pain  Outcome: Progressing     Problem: DISCHARGE PLANNING  Goal: Discharge to home or other facility with appropriate resources  Description  INTERVENTIONS:  - Identify barriers to discharge w/patient and caregiver  - Arrange for needed discharge resources and transportation as appropriate  - Identify discharge learning needs (meds, wound care, etc )  - Arrange for interpretive services to assist at discharge as needed  - Refer to Case Management Department for coordinating discharge planning if the patient needs post-hospital services based on physician/advanced practitioner order or complex needs related to functional status, cognitive ability, or social support system  Outcome: Progressing     Problem: Knowledge Deficit  Goal: Patient/family/caregiver demonstrates understanding of disease process, treatment plan, medications, and discharge instructions  Description  Complete learning assessment and assess knowledge base    Interventions:  - Provide teaching at level of understanding  - Provide teaching via preferred learning methods  Outcome: Progressing     Problem: CARDIOVASCULAR - ADULT  Goal: Maintains optimal cardiac output and hemodynamic stability  Description  INTERVENTIONS:  - Monitor I/O, vital signs and rhythm  - Monitor for S/S and trends of decreased cardiac output  - Administer and titrate ordered vasoactive medications to optimize hemodynamic stability  - Assess quality of pulses, skin color and temperature  - Assess for signs of decreased coronary artery perfusion  - Instruct patient to report change in severity of symptoms  Outcome: Progressing  Goal: Absence of cardiac dysrhythmias or at baseline rhythm  Description  INTERVENTIONS:  - Continuous cardiac monitoring, vital signs, obtain 12 lead EKG if ordered  - Administer antiarrhythmic and heart rate control medications as ordered  - Monitor electrolytes and administer replacement therapy as ordered  Outcome: Progressing     Problem: GENITOURINARY - ADULT  Goal: Maintains or returns to baseline urinary function  Description  INTERVENTIONS:  - Assess urinary function  - Encourage oral fluids to ensure adequate hydration if ordered  - Administer IV fluids as ordered to ensure adequate hydration  - Administer ordered medications as needed  - Offer frequent toileting  - Follow urinary retention protocol if ordered  Outcome: Progressing  Goal: Absence of urinary retention  Description  INTERVENTIONS:  - Assess patients ability to void and empty bladder  - Monitor I/O  - Bladder scan as needed  - Discuss with physician/AP medications to alleviate retention as needed  - Discuss catheterization for long term situations as appropriate  Outcome: Progressing     Problem: METABOLIC, FLUID AND ELECTROLYTES - ADULT  Goal: Electrolytes maintained within normal limits  Description  INTERVENTIONS:  - Monitor labs and assess patient for signs and symptoms of electrolyte imbalances  - Administer electrolyte replacement as ordered  - Monitor response to electrolyte replacements, including repeat lab results as appropriate  - Instruct patient on fluid and nutrition as appropriate  Outcome: Progressing  Goal: Fluid balance maintained  Description  INTERVENTIONS:  - Monitor labs   - Monitor I/O and WT  - Instruct patient on fluid and nutrition as appropriate  - Assess for signs & symptoms of volume excess or deficit  Outcome: Progressing

## 2020-01-02 NOTE — PLAN OF CARE
Problem: Potential for Falls  Goal: Patient will remain free of falls  Description  INTERVENTIONS:  - Assess patient frequently for physical needs  -  Identify cognitive and physical deficits and behaviors that affect risk of falls    -  [place] fall precautions as indicated by assessment   - Educate patient/family on patient safety including physical limitations  - Instruct patient to call for assistance with activity based on assessment  - Modify environment to reduce risk of injury  - Consider OT/PT consult to assist with strengthening/mobility  1/2/2020 0126 by Asmita Song RN  Outcome: Progressing  1/1/2020 2019 by Asmita Song RN  Outcome: Progressing     Problem: Prexisting or High Potential for Compromised Skin Integrity  Goal: Skin integrity is maintained or improved  Description  INTERVENTIONS:  - Identify patients at risk for skin breakdown  - Assess and monitor skin integrity  - Assess and monitor nutrition and hydration status  - Monitor labs   - Assess for incontinence   - Turn and reposition patient  - Assist with mobility/ambulation  - Relieve pressure over bony prominences  - Avoid friction and shearing  - Provide appropriate hygiene as needed including keeping skin clean and dry  - Evaluate need for skin moisturizer/barrier cream  - Collaborate with interdisciplinary team   - Patient/family teaching  - Consider wound care consult   1/2/2020 0126 by Asmita Song RN  Outcome: Progressing  1/1/2020 2019 by Asmita Song RN  Outcome: Progressing     Problem: PAIN - ADULT  Goal: Verbalizes/displays adequate comfort level or baseline comfort level  Description  Interventions:  - Encourage patient to monitor pain and request assistance  - Assess pain using appropriate pain scale  - Administer analgesics based on type and severity of pain and evaluate response  - Implement non-pharmacological measures as appropriate and evaluate response  - Consider cultural and social influences on pain and pain management  - Notify physician/advanced practitioner if interventions unsuccessful or patient reports new pain  1/2/2020 0126 by Yecenia Lewis RN  Outcome: Progressing  1/1/2020 2019 by Yecenia Lewis RN  Outcome: Progressing     Problem: DISCHARGE PLANNING  Goal: Discharge to home or other facility with appropriate resources  Description  INTERVENTIONS:  - Identify barriers to discharge w/patient and caregiver  - Arrange for needed discharge resources and transportation as appropriate  - Identify discharge learning needs (meds, wound care, etc )  - Arrange for interpretive services to assist at discharge as needed  - Refer to Case Management Department for coordinating discharge planning if the patient needs post-hospital services based on physician/advanced practitioner order or complex needs related to functional status, cognitive ability, or social support system  1/2/2020 0126 by Yecenia Lewis RN  Outcome: Progressing  1/1/2020 2019 by Yecenia Lewis RN  Outcome: Progressing     Problem: Knowledge Deficit  Goal: Patient/family/caregiver demonstrates understanding of disease process, treatment plan, medications, and discharge instructions  Description  Complete learning assessment and assess knowledge base    Interventions:  - Provide teaching at level of understanding  - Provide teaching via preferred learning methods  1/2/2020 0126 by Yecenia Lewis RN  Outcome: Progressing  1/1/2020 2019 by Yecenia Lewis RN  Outcome: Progressing     Problem: CARDIOVASCULAR - ADULT  Goal: Maintains optimal cardiac output and hemodynamic stability  Description  INTERVENTIONS:  - Monitor I/O, vital signs and rhythm  - Monitor for S/S and trends of decreased cardiac output  - Administer and titrate ordered vasoactive medications to optimize hemodynamic stability  - Assess quality of pulses, skin color and temperature  - Assess for signs of decreased coronary artery perfusion  - Instruct patient to report change in severity of symptoms  1/2/2020 0126 by Chetan Barcenas RN  Outcome: Progressing  1/1/2020 2019 by Chetan Barcenas RN  Outcome: Progressing  Goal: Absence of cardiac dysrhythmias or at baseline rhythm  Description  INTERVENTIONS:  - Continuous cardiac monitoring, vital signs, obtain 12 lead EKG if ordered  - Administer antiarrhythmic and heart rate control medications as ordered  - Monitor electrolytes and administer replacement therapy as ordered  1/2/2020 0126 by Chetan Barcenas RN  Outcome: Progressing  1/1/2020 2019 by Chetan Barcenas RN  Outcome: Progressing     Problem: GENITOURINARY - ADULT  Goal: Maintains or returns to baseline urinary function  Description  INTERVENTIONS:  - Assess urinary function  - Encourage oral fluids to ensure adequate hydration if ordered  - Administer IV fluids as ordered to ensure adequate hydration  - Administer ordered medications as needed  - Offer frequent toileting  - Follow urinary retention protocol if ordered  1/2/2020 0126 by Chetan Barcenas RN  Outcome: Progressing  1/1/2020 2019 by Chetan Barcenas RN  Outcome: Progressing  Goal: Absence of urinary retention  Description  INTERVENTIONS:  - Assess patients ability to void and empty bladder  - Monitor I/O  - Bladder scan as needed  - Discuss with physician/AP medications to alleviate retention as needed  - Discuss catheterization for long term situations as appropriate  1/2/2020 0126 by Chetan Barcenas RN  Outcome: Progressing  1/1/2020 2019 by Chetan Barcenas RN  Outcome: Progressing     Problem: METABOLIC, FLUID AND ELECTROLYTES - ADULT  Goal: Electrolytes maintained within normal limits  Description  INTERVENTIONS:  - Monitor labs and assess patient for signs and symptoms of electrolyte imbalances  - Administer electrolyte replacement as ordered  - Monitor response to electrolyte replacements, including repeat lab results as appropriate  - Instruct patient on fluid and nutrition as appropriate  1/2/2020 0126 by Mar Jean RN  Outcome: Progressing  1/1/2020 2019 by Mar Jean RN  Outcome: Progressing  Goal: Fluid balance maintained  Description  INTERVENTIONS:  - Monitor labs   - Monitor I/O and WT  - Instruct patient on fluid and nutrition as appropriate  - Assess for signs & symptoms of volume excess or deficit  1/2/2020 0126 by Mar Jean RN  Outcome: Progressing  1/1/2020 2019 by Mar Jean RN  Outcome: Progressing

## 2020-01-03 PROBLEM — E78.2 MIXED HYPERLIPIDEMIA: Status: ACTIVE | Noted: 2020-01-03

## 2020-01-03 PROBLEM — G30.1 LATE ONSET ALZHEIMER'S DISEASE WITH BEHAVIORAL DISTURBANCE (HCC): Status: ACTIVE | Noted: 2020-01-03

## 2020-01-03 PROBLEM — F02.81 LATE ONSET ALZHEIMER'S DISEASE WITH BEHAVIORAL DISTURBANCE (HCC): Status: ACTIVE | Noted: 2020-01-03

## 2020-01-03 PROCEDURE — 97167 OT EVAL HIGH COMPLEX 60 MIN: CPT

## 2020-01-03 PROCEDURE — 97163 PT EVAL HIGH COMPLEX 45 MIN: CPT

## 2020-01-03 PROCEDURE — 97530 THERAPEUTIC ACTIVITIES: CPT

## 2020-01-03 PROCEDURE — 97535 SELF CARE MNGMENT TRAINING: CPT

## 2020-01-03 PROCEDURE — 99306 1ST NF CARE HIGH MDM 50: CPT | Performed by: FAMILY MEDICINE

## 2020-01-03 PROCEDURE — 97116 GAIT TRAINING THERAPY: CPT

## 2020-01-03 RX ORDER — POLYETHYLENE GLYCOL 3350 17 G/17G
17 POWDER, FOR SOLUTION ORAL DAILY PRN
Status: DISCONTINUED | OUTPATIENT
Start: 2020-01-03 | End: 2020-01-17 | Stop reason: HOSPADM

## 2020-01-03 RX ORDER — AMOXICILLIN 250 MG
1 CAPSULE ORAL 2 TIMES DAILY PRN
Status: DISCONTINUED | OUTPATIENT
Start: 2020-01-03 | End: 2020-01-17 | Stop reason: HOSPADM

## 2020-01-03 RX ORDER — ATORVASTATIN CALCIUM 40 MG/1
40 TABLET, FILM COATED ORAL
Status: DISCONTINUED | OUTPATIENT
Start: 2020-01-03 | End: 2020-01-17 | Stop reason: HOSPADM

## 2020-01-03 RX ORDER — AMOXICILLIN 250 MG
1 CAPSULE ORAL 2 TIMES DAILY PRN
Status: DISCONTINUED | OUTPATIENT
Start: 2020-01-03 | End: 2020-01-03

## 2020-01-03 RX ORDER — FUROSEMIDE 20 MG/1
20 TABLET ORAL DAILY
Status: DISCONTINUED | OUTPATIENT
Start: 2020-01-03 | End: 2020-01-03

## 2020-01-03 RX ADMIN — WARFARIN SODIUM 3 MG: 3 TABLET ORAL at 17:01

## 2020-01-03 RX ADMIN — ISOSORBIDE DINITRATE 20 MG: 10 TABLET ORAL at 12:10

## 2020-01-03 RX ADMIN — ACETAMINOPHEN 650 MG: 325 TABLET ORAL at 12:09

## 2020-01-03 RX ADMIN — TUBERCULIN PURIFIED PROTEIN DERIVATIVE 5 UNITS: 5 INJECTION INTRADERMAL at 17:02

## 2020-01-03 RX ADMIN — FUROSEMIDE 40 MG: 40 TABLET ORAL at 08:08

## 2020-01-03 RX ADMIN — PANTOPRAZOLE SODIUM 40 MG: 40 TABLET, DELAYED RELEASE ORAL at 06:21

## 2020-01-03 RX ADMIN — GABAPENTIN 300 MG: 300 CAPSULE ORAL at 08:08

## 2020-01-03 RX ADMIN — TAMSULOSIN HYDROCHLORIDE 0.8 MG: 0.4 CAPSULE ORAL at 17:00

## 2020-01-03 RX ADMIN — ATORVASTATIN CALCIUM 40 MG: 80 TABLET, FILM COATED ORAL at 17:01

## 2020-01-03 RX ADMIN — METOPROLOL SUCCINATE 25 MG: 25 TABLET, EXTENDED RELEASE ORAL at 08:08

## 2020-01-03 RX ADMIN — DIPHENHYDRAMINE HCL 25 MG: 25 TABLET ORAL at 12:09

## 2020-01-03 RX ADMIN — ISOSORBIDE DINITRATE 20 MG: 10 TABLET ORAL at 08:09

## 2020-01-03 NOTE — PHYSICAL THERAPY NOTE
Physical Therapy Evaluation: 20 minutes ( 13:10 to 13:30)    Patient's Name: Leticia Baldwin    Admitting Diagnosis  Renal failure [N19]    Problem List  Patient Active Problem List   Diagnosis    Periorbital edema    Chronic systolic congestive heart failure (HCC)    Renal disorder    GERD (gastroesophageal reflux disease)    Paroxysmal atrial fibrillation (HCC)    HTN (hypertension)    Acute renal failure superimposed on stage 4 chronic kidney disease (HCC)    [facility]    Secondary hyperparathyroidism ([facility]    Hypercalcemia    BPH (benign prostatic hyperplasia)    [facility]    Hyperkalemia    Age-related cognitive decline    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)    Chronic kidney disease-mineral and bone disorder    Dementia with psychosis ([facility]    Chronic atrial fibrillation    Macular atrophy, retinal    Chest pain    Generalized abdominal pain    MGUS (monoclonal gammopathy of unknown significance)    Pulmonary nodule    Ambulatory dysfunction    Coronary artery disease    Mixed hyperlipidemia    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 ([facility] )       Past Medical History  Past Medical History:   Diagnosis Date    A-fib ([facility]     CHF (congestive heart failure) (HCC)     CKD (chronic kidney disease) stage 4, GFR 15-29 ml/min (HCC)     GERD (gastroesophageal reflux disease)     Glaucoma     History of transfusion     Hypertension     Renal disorder        Past Surgical History  Past Surgical History:   Procedure Laterality Date    ABDOMINAL SURGERY      CARDIAC PACEMAKER PLACEMENT      EYE SURGERY      HERNIA REPAIR      Doesn't recall, pt states he may have   VASCULAR SURGERY         Time- High Complexity Evaluation +  25 minutes treatment    Vitals- See tx note   RN notified of pt's asymptomatic orthostatic BP's, low BP   No s/s of distress   Pt without c/o dizziness/lightheadedness when mobilizing  Standardized Assessment-  Barthel Index Score: 45/100     Home Evaluation (virtual)- Pt stated that his daughter can take pictures as needed  Family/Caregiver Training- To be perform if indicated , prior to discharge     Additional Comments-  Monitor BP's, + stuttering, flat affect         01/03/20 1310   Note Type   Note type Eval/Treat   Pain Assessment   Pain Assessment 0-10   Pain Score 7   Pain Type Chronic pain   Pain Location Ankle;Leg;Shoulder  (Bilateral feet (L >R), L shoulder, 10 toes)   Pain Descriptors Other (Comment)  (L shldr aches, L toes sore/numb, 10 fingers numb)   Pain Frequency Constant/continuous   Home Living   Type of Home House  (1 SHERI over doorstoop ( holds doorway for support))   Home Layout Two level; Able to live on main level with bedroom/bathroom;Stairs to enter without rails; Performs ADLs on one level;Stairs to enter with rails  (Pt stays on first floor with full bathroom and bedroom)   Bathroom Shower/Tub Tub/shower unit   [employer] Grab bars in shower; Shower chair   Bathroom Accessibility Accessible via walker  (Rollator fits in bathroom, but pt leaves outside door)   Home Equipment Cane;Grab bars  (Rollator ( here on TCF Unit))   Prior Function -> Prior to hospitalization, pt ambulated with a rollator household distances with MOD I and short community distances with Supervision from daughter  Level of [place] Independent with ADLs and functional mobility; Needs assistance with IADLs   Lives With Friend(s)   Receives Help From Family   ADL Assistance Independent  (Except needs assist with transfers in/out of shower)   IADLs Needs assistance  (Dami completes')   Falls in the last 6 months 1 to 4   Vocational Retired  ()   Restrictions/Precautions   [facility] Bearing Precautions Per Order No   Other Precautions Fall Risk;Pain;Hard of hearing;Visual impairment;Fluid restriction  (1500ml FR, 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, decreased vision L eye)   General   Family/Caregiver Present No   Cognition   Overall Cognitive Status Impaired   Arousal/Participation Cooperative   Attention Within functional limits   Orientation Level Oriented to person;Disoriented to time;Disoriented to situation;Oriented to place   Memory Decreased short term memory;Decreased recall of recent events   Following Commands Follows one step commands without difficulty   Comments Decreased insight into deficits; impaired safety awareness; requires increased time to process and respond    Coordination   Movements are Fluid and Coordinated 0   Light Touch   RLE Light Touch Impaired  (Difficulty discerning all toes)   LLE Light Touch Impaired   LLE Light Touch Comments Difficulty discerning all toes  (Left toes are numb and sore)   Bed Mobility   Rolling R 6  Modified independent   Additional items Increased time required   Rolling L 6  Modified independent   Additional items Increased time required   Supine to Sit 5  Supervision   Additional items Increased time required   Sit to Supine 5  Supervision   Additional items Increased time required  (Uses hands to lift RUE onto bed)   Additional Comments   (Bed mobility: HOB flat, no rail)   Transfers   Sit to Stand   (CG A)   Additional items Increased time required  (Cues for hand placement)   Stand to Sit   (CG A)   Additional items   (Cues for hand placement and to control descent)   Stand pivot   (CG A)   Additional items   (SPT with Rollator -> cues for AD management)   Toilet transfer Unable to assess  (Pt reported not needing to use the bathroom)   Ambulation/Elevation   Gait pattern Improper Weight shift; Forward Flexion;Narrow ERICA; Short stride;Decreased foot clearance; Excessively slow  (Right steppage gait, heavily flexed posture)   Gait Assistance   (CG A)   Additional items   (Pt performed 2 turns during ambulation trial)   Assistive Device   (Rollator)   Distance 17 feet    Stair Management Assistance Not tested   Curbs Deferred => orthostatic hypotension, + low BP   Balance   Static Sitting   (Good (-))   Dynamic Sitting Fair +   Static Standing   (Fair with rollator)   Dynamic Standing   (Fair (-) with rollator)   Ambulatory   (Fair (-) with rollator)   Endurance Deficit   Endurance Deficit Yes   Endurance Deficit Description   (Decreased endurance for activity)   Activity Tolerance   Activity Tolerance Patient limited by fatigue;Patient limited by pain  (Limited to by + orthostatic BP tilt and low BP)   Assessment   Prognosis Good   Problem List Decreased strength;Decreased range of motion;Decreased endurance; Impaired balance;Decreased mobility; Decreased coordination;Decreased safety awareness; Impaired judgement;Decreased cognition; Impaired vision; Impaired hearing; Impaired sensation;Decreased skin integrity;Pain;Orthopedic restrictions   Assessment Cristiano Marcos is a [de-identified] y o  male initially admitted to [facility] on 12/29/2019 for Acute renal failure superimposed on stage 4 chronic kidney disease ([facility] )  Pt transferred to [address] [place] [address] for rehab on 1/2/20  Pt  has a past medical history of A-fib ([facility] ), [facility] CKD (chronic kidney disease) stage 4, GFR 15-29 ml/min ([facility]), GERD (gastroesophageal reflux disease), Glaucoma, Hypertension, and Renal disorder  In summary, guiding factors including patient history, examination of body sytem(s), clinical presentation and clinical decision making were considered  Pt presents with comorbid conditions that impact function, comorbid conditions that may limit ability to progress, context of current functional limitations as compared to the prior level of function, impaired prior level of function, limited physical/social support, participation restrictions, with living environment deficits, and advanced age   Pt also presents with impaired: cognition, safety, skin condition, vision, hearing, vitals, BLE MMT strength, functional strength, endurance for activity, sit and stand balance, bed mobility, transfers, and gait abilities  Pt's right DF PROM WFL's  However, pt assessed to have right foot drop with subsequent steppage gait -> pt stated being unsure what caused this; can't remember if he has a brace or not  Clinical presentation is with unstable and unpredictable characteristics  The assigned level of complexity is: High  Pt will benefit from skilled PT tx intervention to maximize safe mobility in prep for discharge  Barriers to Discharge Inaccessible home environment;Decreased caregiver support  (Home alone at times, below functional baseline)   Goals   Patient Goals " To go back to bed, I'm tired"   STG Expiration Date 01/17/20   PT Treatment Day 1   Plan   Treatment/Interventions ADL retraining;Functional transfer training;LE strengthening/ROM; Elevations; Therapeutic exercise; Endurance training;Cognitive reorientation;Patient/family training;Equipment eval/education; Bed mobility;Gait training; Compensatory technique education;Continued evaluation;Spoke to MD;Spoke to nursing;Spoke to case management;Spoke to advanced practitioner;OT;Family   PT Frequency   (5 to 7x/week)   Recommendation   Equipment Recommended   (TBD)   Barthel Index   Feeding 10   Bathing 0   Grooming Score 0   Dressing Score 5   Bladder Score 5   Bowels Score 10   Toilet Use Score 5   Transfers (Bed/Chair) Score 10   Mobility (Level Surface) Score 0   Stairs Score 0   Barthel Index Score 45         Goals STG achieved within  2 weeks Performance at Initial Evaluation (01/03/20) Current Performance (last date completed)   Supine to sit transitions to increase ease of transfer, allow pt to get out of bed, and decrease caregiver burden    MOD I Supervision   - HOB flat  - No rail 01/03/20   Sit to supine transitions to increase ease of transfer, allow pt to get back into bed, and decrease caregiver burden MOD I Supervision   - HOB flat  - No rail 01/03/20   Functional transfers to facilitate safe return to previous living environment     MOD I Sit <->stand tranfers: CG A     SPT with Rollator: CG A 01/03/20   Ambulation with rollator for 50 feet   ( for safe household distance ambulation)     MOD I Ambulate with rollator 17 feet with CG A 01/03/20   Ambulation with rollator for > or = 100 feet ( to initiate short community distance ambulation, Supervised by daughter)     Supervision  Unable to ambulate > 17 feet with rollator ; due to very low blood pressure 01/03/20       Ascend/descend a curb step, using appropriate AD and method, no handrails, for safe access to previous living environment CG A Deferred today -> due to very low BP 01/03/20  Deferrred               BLE MMT Strength  Right hip flexion: 2/5  Left hip flexion: 3- to 3/5    Right knee /:3 to 3+/5  Left knee /: 3+ to 4-/5    Right ankle DF: 0/5 ( + foot drop)  Left ankle DF: 4-/5    Right ankle PF: 3-/5  Left ankle PF: 4-/4    Vitals -> RN Diane Hernandez notified of asymptomatic orthostatic BP tilt, low BP's  Seated at rest: BP 92/52, HR 70, SPO2 (RA) 94%  After ambulating with a Rollator for 17 feet ( seated): BP 85/71, HR 75, SPO2 (RA) 96%    Kimber Montes, PT     ____________________________________________________________      PHYSICAL THERAPY TREATMENT NOTE    Time In: 13:20   Time Out: 13:45  Total Time:  25 minutes            S:" I' m sick of people asking me the same questions over and over again !"     O:   Patient Education:  Educated pt on benefits of mobility, risks of immobility, differences between PT/OT, functional mobility training with a rollator ( during transfers and gait), using appropriate hand placement  with sit <->stand transfers and proper AD management with SPT's  During sit <->stand transfers,  pt attempts to pull to stand from RW; then keeps hands on RW when sitting  Discussed importance of controlling decent during stand to sit transfers to decrease risk of spinal compression fractures   Pt occasionally steps outside rollator  pushes rollator  too far forward during transfers and gait, and abandons rollator during SPT's  Reviewed use of call bell for assistance, and POC  Transfers ( recliner, unsecured low chair, EOB) : sit <->stand CG A, SPT with RW CG A    Patient Education:  Educated pt on benefits of mobility, risks of immobility, differences between PT/OT, functional mobility training with a RW ( during transfers and gait), using appropriate hand placement  with sit <->stand transfers and proper RW management with SPT's  During sit <->stand transfers,  pt attempts to pull to stand from RW; then keeps hands on RW when sitting  Discussed importance of controlling decent during stand to sit transfers to decrease risk of spinal compression fractures  Pt occasionally steps outside RW during transfers and gait and occassionally abandons RW during SPT's  Reviewed use of call bell for assistance, and POC  Transfers ( EOB, recliner chair): sit <->stand CG A, SPT with RW MIN A    Gait: Ambulated with a RW for 33 feet with MIN A ( see PT Eval above for gait deviations)    Balance: Please refer to PT Blane for details on sit and stand balance  At end of session pt requested to go back to bed w/o issues  Call bell and phone within reach  A: Pt seen for physical therapy treatment consisting of pt education,  transfer and gait training at bedside  Presents with generalize deconditioning s/p recent hospital stay  Patient questionably receptive to education and was somewhat upset when notified that he will be seen for   7 x/week  P: Continue skilled PT tx, as per POC in prep for return to home with daughter        Dirk Rubin, PT

## 2020-01-03 NOTE — PROGRESS NOTES
RECREATIONAL THERAPY PARTICIPATION LOG      ACTIVITY:    GAMES:        BINGO:        MUSIC STIM:        ARTS & CRAFTS:        EXERCISE:        CLUBS & MEETING:        SOCIALS:        SPIRITUAL: Bible-reading, when able  INDEPENDENT:        1:1:    Resident was seen for a 1:1 Recreational Therapy greeting, introduction to the Activity Program and visit  Resident participated in the Preferences for Customary Routine and Activities assessment  Resident shared that he prefers to take a tub bath; however, he is currently taking a sponge bath in our facility  Resident would like to work towards taking a bath, as a goal with his other therapists, to make this happen  It is very important to resident to have his daughter involved in discussions about his care while here as a resident in our facility  Resident would like to have privacy when speaking on the phone and when working with caregivers in his room; although, he did mention that he does not believe that this is possible here in the hospital   Alternative places to speak on the phone in private were suggested to resident, such as the Lounge and the [facility]  In addition to this, requesting a private room was mentioned to the resident  Resident communicated that he enjoys listening to Cheryl B [address], [employer]  He explained that his daughter does not believe in listening to music or Holiday music when it was suggested to him that she could bring some Blues cassettes in for him to play in the cassette player in the Transitional Care Unit  When resident was asked about how important it is to him to have animals around him here in our facility, he responded by sharing a story about his black labrador and how broken-hearted he is since he went to live at another place    Resident is not interested in having animals around him here in the hospital       Resident does like to watch the News on television and he tunes into the News on the station, [employer], of his telephone  Resident likes to play Billiards, "shooting pool", as a hobby when he is able  He would like to try to play the game Loki and Devyn Stabs sometime when he has the opportunity  Reading the Bible in his room and praying everywhere is very important to him  He believes that he does not need to go to [place] in order to pray  A Bible was given to him to keep at his bedside while he is a patient/resident with us in the Transitional Care Unit  He has a magnifying glass to enlarge the print  He shared that he now has vision in one eye  SCOTTIE Cochran

## 2020-01-03 NOTE — SOCIAL WORK
Pt prefers his daughter signs consents  SW called patient's daughter, she will be on the unit today  SW will follow-up

## 2020-01-03 NOTE — H&P
H&P- Tong Telles 1940, [de-identified] y o  male MRN: [id number]    Unit/Bed#: -02 Encounter: [id number]    Primary Care Provider: Meng Walsh PA-C   Date and time admitted to hospital: 1/2/2020  8:20 PM        * Acute renal failure superimposed on stage 4 chronic kidney disease [facility])  Assessment & Plan  Patient was recently admitted to [facility] secondary to acute kidney injury on CKD stage 4  Baseline creatinine is 2 4-3 2  Peak creatinine on prior admission was 4 2  Continue Lasix and monitor renal function closely every week  Avoid nephrotoxin agents  Avoid hypotension  Patient recently had some bouts of hypotension and hence need to be cautious  No hydronephrosis noted on imaging and normal postvoid residual volumes noted  Will need outpatient follow-up with Nephrology    Patient is very weak and deconditioned requires physical therapy and occupation therapy for evaluation and to work with him to help improve his ambulatory status  Endurance, stamina and performance of ADL and IADL      PAD (peripheral artery disease) ([facility] [address] )  Assessment & Plan  Patient has known peripheral arterial disease  Continue Coumadin for now  Continue adequate blood pressure control and statin therapy   right lower extremity stent previously placed  Patient has chronic neuropathic pain and continue Neurontin  Also needs physical therapy occupational therapy to help with improving his stamina and endurance and performance of ADL and IADL  Also has history of DVT status post IVC filter    Chronic systolic congestive heart failure [facility])  Assessment & Plan  Wt Readings from Last 3 Encounters:   01/03/20 75 7 kg (166 lb 14 2 oz)   01/02/20 73 6 kg (162 lb 4 1 oz)   09/23/19 77 1 kg (170 lb)     Is currently euvolemic  He has known history of chronic systolic CHF with EF of 73% on last 2D echo done 02/19  Patient's baseline weight is 162 lb -166 lb  Currently he is on Lasix 40 mg daily    If his weight increases above 170 lb will need to increase his diuretic dose  Monitor renal function during diuresis as he recently had SHAHIDA  Check BMP on Monday      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 [facility])  Assessment & Plan  Patient has known dementia with behavioral disturbance  He is currently at his baseline  Needs fall precautions and monitor his behaviors during the evenings for signs of sundowning    Mixed hyperlipidemia  Assessment & Plan  Will check a lipid panel on Monday  Patient's recent lipid Lantus have been normal   Will decrease Lipitor from 80 mg daily to 40 mg daily    Anemia due to stage 4 chronic kidney disease ([facility] [address] )  Assessment & Plan  Patient's hemoglobin is 9 9  Patient has anemia of chronic disease and renal dysfunction   Stable at this time    BPH (benign prostatic hyperplasia)  Assessment & Plan  Stable on Flomax 0 8 mg daily  Voiding appears to be normal at this time    Paroxysmal atrial fibrillation [facility])  Assessment & Plan  EKG currently shows atrial paced rhythm  Continue metoprolol for rate control and Coumadin for anticoagulation  INR is therapeutic    GERD (gastroesophageal reflux disease)  Assessment & Plan  Stable on Protonix    Chronic left anterior pleural effusion:  Patient appears to have an chronic left anterior loculated pleural effusion  Will continue to observe for now  If there is any fevers or chills or worsening shortness of breath it may need to be further evaluated and treated  VTE Prophylaxis: Warfarin (Coumadin)  / reason for no mechanical VTE prophylaxis On Coumadin   Code Status:  Full code  POLST: There is no POLST form on file for this patient (pre-hospital)  Discussion with family:  None    Anticipated Length of Stay:  Patient will be admitted on an SNF Short Term Inpatient basis with an anticipated length of stay of  more than 2 midnights     Justification for Hospital Stay:  Acute renal failure on CKD stage 4    Total Time for Visit, including Counseling / Coordination of Care: 1 hour  Greater than 50% of this total time spent on direct patient counseling and coordination of care  Chief Complaint:   I feel very weak and my legs hurt    History of Present Illness:    Keila Montana is a [de-identified] y o  male who presents with generalized weakness and chronic leg pain  Patient was recently admitted to [facility] secondary to acute kidney injury on CKD stage 4   His diuretics were held and giving gentle hydration following which she started to improved  He did have some bouts of hypotension  He is now doing a little bit better but complains of pain in his legs and also has difficulty standing or walking or doing anything  He also has difficulty with dementia with behavioral disturbance and has trouble with his memory    Review of Systems:    Review of Systems   Constitutional: Positive for appetite change and fatigue  Negative for chills and fever  HENT: Negative for hearing loss, sore throat and trouble swallowing  Eyes: Negative for photophobia, discharge and visual disturbance  Respiratory: Positive for shortness of breath  Negative for chest tightness  Cardiovascular: Negative for chest pain and palpitations  Gastrointestinal: Negative for abdominal pain, blood in stool and vomiting  Endocrine: Negative for polydipsia and polyuria  Genitourinary: Negative for difficulty urinating, dysuria, flank pain and hematuria  Musculoskeletal: Positive for arthralgias, gait problem and myalgias  Negative for back pain  Skin: Negative for rash  Allergic/Immunologic: Negative for environmental allergies and food allergies  Neurological: Negative for dizziness, seizures, syncope and headaches  Hematological: Does not bruise/bleed easily  Psychiatric/Behavioral: Positive for behavioral problems  The patient is nervous/anxious  All other systems reviewed and are negative        Past Medical and Surgical History:     Past Medical History:   Diagnosis Date    A-fib ([facility] [address] )     CHF (congestive heart failure) ([facility])     CKD (chronic kidney disease) stage 4, GFR 15-29 ml/min ([facility])     GERD (gastroesophageal reflux disease)     Glaucoma     History of transfusion     Hypertension     Renal disorder        Past Surgical History:   Procedure Laterality Date    ABDOMINAL SURGERY      CARDIAC PACEMAKER PLACEMENT      EYE SURGERY      HERNIA REPAIR      Doesn't recall, pt states he may have   VASCULAR SURGERY         Meds/Allergies:    Prior to Admission medications    Medication Sig Start Date End Date Taking?  Authorizing Provider   acetaminophen (TYLENOL) 650 mg CR tablet Take 650 mg by mouth every 8 (eight) hours as needed for mild pain   Yes Historical Provider, MD   diphenhydrAMINE (BENADRYL) 50 MG tablet Take 25 mg by mouth every 6 (six) hours as needed for itching    Yes Historical Provider, MD   furosemide (LASIX) 40 mg tablet Take 40 mg by mouth 2 (two) times a day 3/19/19  Yes Historical Provider, MD   gabapentin (NEURONTIN) 300 mg capsule Take 300 mg by mouth daily 3/19/19  Yes Historical Provider, MD   hydrALAZINE (APRESOLINE) 25 mg tablet Take 50 mg by mouth Three times a day 4/25/19  Yes Historical Provider, MD   isosorbide dinitrate (ISORDIL) 20 mg tablet Take 20 mg by mouth Three times a day 6/13/19 1/2/20 Yes Historical Provider, MD   metoprolol succinate (TOPROL-XL) 25 mg 24 hr tablet Take 25 mg by mouth daily 3/19/19  Yes Historical Provider, MD   pantoprazole (PROTONIX) 40 mg tablet Take 40 mg by mouth daily 3/19/19  Yes Historical Provider, MD   tamsulosin (FLOMAX) 0 4 mg Take 0 8 mg by mouth 5/17/19 5/16/20 Yes Historical Provider, MD   warfarin (COUMADIN) 3 mg tablet Take 3 mg by mouth daily   Yes Historical Provider, MD   atorvastatin (LIPITOR) 80 mg tablet Take 80 mg by mouth daily 3/19/19   Historical Provider, MD   hydrocortisone 1 % cream Apply topically 4 (four) times a day as needed for irritation for up to 5 days (DO NOT USE FOR MORE THAN 5 DAYS) 19  Danny Yates DO   potassium chloride (K-DUR,KLOR-CON) 10 mEq tablet Take 10 mEq by mouth daily    Historical Provider, MD     I have reviewed home medications with patient personally  Allergies: No Known Allergies    Social History:     Marital Status: Single   Social History     Substance and Sexual Activity   Alcohol Use Never    Frequency: Never    Drinks per session: Patient refused    Binge frequency: Patient refused     Social History     Tobacco Use   Smoking Status Former Smoker    Packs/day: 2 00    Years: 30 00    Pack years: 60 00    Last attempt to quit: Elvi Zheng Years since quittin 0   Smokeless Tobacco Never Used   Tobacco Comment    quit 40 years ago     Social History     Substance and Sexual Activity   Drug Use Never       Family History:    Family History   Problem Relation Age of Onset    No Known Problems Mother     Hypertension Father     Arthritis Maternal Grandmother        Physical Exam:     Vitals:   Blood Pressure: 108/67 (20 1209)  Pulse: 64 (20 1209)  Temperature: 98 6 °F (37 °C) (20)  Temp Source: Temporal (20)  Respirations: 20 (20 0710)  Height: 5' 6" (167 6 cm) (20)  Weight - Scale: 75 7 kg (166 lb 14 2 oz) (20 06)  SpO2: 97 % (20 0710)    Physical Exam   Constitutional: He appears well-developed and well-nourished  He appears distressed  HENT:   Head: Normocephalic and atraumatic  Right Ear: External ear normal    Left Ear: External ear normal    Mouth/Throat: Oropharynx is clear and moist    Eyes: Conjunctivae and EOM are normal    Neck: Normal range of motion  Neck supple  Cardiovascular: Normal rate, regular rhythm and normal heart sounds  Pulmonary/Chest: Effort normal    Decreased breath sounds left base  Moderate air entry bilaterally   Abdominal: Soft  Bowel sounds are normal  He exhibits no mass  There is no tenderness   There is no rebound and no guarding  Genitourinary:   Genitourinary Comments: deferred   Musculoskeletal: He exhibits edema and tenderness  Tenderness on palpation of bilateral lower extremity  1+ nonpitting edema bilateral lower extremity   Neurological: He is alert  He has normal reflexes  Cranial nerves 2-12 are normal   Oriented to person and place   Skin: Skin is warm and dry  No rash noted  Psychiatric:   Anxious   Nursing note and vitals reviewed  Additional Data:     Lab Results: I have personally reviewed pertinent reports  Results from last 7 days   Lab Units 01/02/20  0510   WBC Thousand/uL 4 31   HEMOGLOBIN g/dL 9 9*   HEMATOCRIT % 31 1*   PLATELETS Thousands/uL 165   NEUTROS PCT % 48   LYMPHS PCT % 32   MONOS PCT % 7   EOS PCT % 11*     Results from last 7 days   Lab Units 01/02/20  0510  12/29/19  1110   SODIUM mmol/L 138   < > 137   POTASSIUM mmol/L 3 9   < > 4 4   CHLORIDE mmol/L 105   < > 106   CO2 mmol/L 23   < > 20*   BUN mg/dL 60*   < > 67*   CREATININE mg/dL 3 21*   < > 4 28*   ANION GAP mmol/L 10   < > 11   CALCIUM mg/dL 9 4   < > 9 4   ALBUMIN g/dL  --   --  3 6   TOTAL BILIRUBIN mg/dL  --   --  0 56   ALK PHOS U/L  --   --  92   ALT U/L  --   --  15   AST U/L  --   --  31   GLUCOSE RANDOM mg/dL 104   < > 99    < > = values in this interval not displayed  Results from last 7 days   Lab Units 01/02/20  0518   INR  2 34*             Results from last 7 days   Lab Units 12/29/19  1433 12/29/19  1133   LACTIC ACID mmol/L 0 7 0 9       Imaging: I have personally reviewed pertinent reports  No orders to display       EKG, Pathology, and Other Studies Reviewed on Admission:   · EKG:  Atrial paced rhythm    Allscripts / Epic Records Reviewed: Yes     ** Please Note: This note has been constructed using a voice recognition system   **

## 2020-01-03 NOTE — OCCUPATIONAL THERAPY NOTE
OccupationalTherapy Evaluation and Treatment    [id number] 15 min eval 25 min txt     Patient Name: Maryse Lozada  NAQJI'E Date: 1/3/2020  Problem List  Active Problems:    * No active hospital problems  *    Past Medical History  Past Medical History:   Diagnosis Date    A-fib ([facility] [address] )     CHF (congestive heart failure) (HCC)     CKD (chronic kidney disease) stage 4, GFR 15-29 ml/min (HCC)     GERD (gastroesophageal reflux disease)     Glaucoma     History of transfusion     Hypertension     Renal disorder      Past Surgical History  Past Surgical History:   Procedure Laterality Date    ABDOMINAL SURGERY      CARDIAC PACEMAKER PLACEMENT      EYE SURGERY      HERNIA REPAIR      Doesn't recall, pt states he may have   VASCULAR SURGERY       Vitals:    01/03/20 0600 01/03/20 0622 01/03/20 0710 01/03/20 1209   BP:  116/60 147/76 108/67   BP Location:   Left arm    Pulse:   62 64   Resp:   20    Temp:   98 6 °F (37 °C)    TempSrc:   Temporal    SpO2:   97%    Weight: 75 7 kg (166 lb 14 2 oz)      Height:         Time- [id number] 15 min eval 25 min txt  Standardized Assessment- Barthel 45/100; brief visual screenings assessing visual fields, acuity, convergence, and tracking   Home Evaluation (virtual)- Pt reports family to obtain photos as needed  Family/Caregiver Training- No family present  Will initiate FT as needed  Additional Comments- As per nursing report AM ADL completed as follows:  eating (Setup), bathing (Min/Mod A), UB dressing (Min/Mod A), LB dressing (Min/Mod A)  Pt remains in room with all needs at end of session       01/03/20 0840   Note Type   Note type Eval/Treat   Restrictions/Precautions   Weight Bearing Precautions Per Order No   Other Precautions Cognitive; Fall Risk;Pain;Fluid restriction;Hard of hearing;Visual impairment  (1500 ML)   Pain Assessment   Pain Assessment 0-10   Pain Score 8   Pain Type Chronic pain   Pain Location Knee   Pain Orientation Right   Pain Radiating Towards distally    Pain Frequency Constant/continuous   Pain Onset Ongoing   Clinical Progression   (no change with ambulation vs resting )   Effect of Pain on Daily Activities ambulation/LB dressing    Patient's Stated Pain Goal No pain   Hospital Pain Intervention(s) Repositioned; Ambulation/increased activity; Elevated; Rest   Response to Interventions tolerated session    Home Living   Type of Home House  (1 SHERI (high) doorway for support )   Home Layout Two level;Performs ADLs on one level; Able to live on main level with bedroom/bathroom;Stairs to enter without rails   Bathroom Shower/Tub Tub/shower unit   Bathroom Toilet Standard   Bathroom Equipment Grab bars in shower; Shower chair   Bathroom Accessibility Accessible  (does not take rollator into bathroom )   Home Equipment Walker;Cane;Grab bars  (rollator )   Additional Comments to confirm with family    Prior Function   Level of [place] Independent with ADLs and functional mobility; Needs assistance with IADLs  (reports dtr provides c/S community distances )   Lives With Family;Daughter   Receives Help From Family   ADL Assistance Independent  (A with shower txfrs )   IADLs Needs assistance  (dtr completes laundry, meds, cooking )   Falls in the last 6 months 1 to 4  (as per chart review mulitple falls; pt reports 0 )   Vocational Retired  ( )   Comments Pt presents as poor historian-- to confirm with family  As per pt report, pt [facility] a [address] [facility] with 1st floor setup 1 SHERI (reports approx 8-10") using doorway for support  Pt reports independent with ADLs with the exception of shower chair placement/transfer  Ind with mobility household distances using rollator  Dtr provides c/S with community distances  Dtr completes all IADLs  Dtr drives  Sleeps in standard bed with LE elevated on pillows      Lifestyle   Reciprocal Relationships daughter/familly    Psychosocial   Psychosocial (WDL) WDL   Subjective   Subjective "I can dress myself, it just takes a long time"    ADL   Eating Assistance 5  Supervision/Setup   Grooming Assistance 5  Supervision/Setup   Grooming Deficit Setup; Increased time to complete; Teeth care   UB Bathing Assistance 4  Minimal Assistance   LB Bathing Assistance 3  Moderate Assistance   [address] 3  Moderate [address]  4  Minimal Assistance  (bladder only, reports difficulty moving bowels at this time )   Toileting Deficit Setup;Steadying;Supervison/safety; Clothing management up   Bed Mobility   Rolling R 5  Supervision   Additional items Bedrails; Increased time required;Verbal cues   Rolling L 5  Supervision   Additional items Bedrails; Increased time required;Verbal cues   Supine to Sit Unable to assess  (pt remains in bed at conclusion of session )   Sit to Supine 4  Minimal assistance   Additional items Assist x 1;Bedrails; Increased time required;Verbal cues;LE management  (A need for R LE )   Transfers   Sit to Stand 5  Supervision   Additional items Assist x 1; Armrests; Increased time required;Verbal cues   Stand to Sit 5  Supervision   Additional items Armrests; Increased time required;Verbal cues   Stand pivot   (CGA)   Additional items Assist x 1; Armrests; Increased time required;Verbal cues   Toilet transfer 5  Supervision   Additional items Armrests; Increased time required;Verbal cues;Raised toilet seat   Functional Mobility   Functional Mobility   (CGA )   Additional Comments short distances within room    Additional items   (rollator )   Balance   Static Sitting Fair +   Dynamic Sitting Fair +   Static Standing Fair   Dynamic Standing Poor +   Ambulatory Poor +   Activity Tolerance   Activity Tolerance Patient tolerated treatment well   RUE Assessment   RUE Assessment WFL  (numbness/tingling in hand/DIPs )   LUE Assessment   LUE Assessment X  (3-/5 shoulder; elbow<>distal WFL-- numbness/ting hand )   Hand Function   Gross Motor Coordination Functional Fine Motor Coordination Functional   Sensation   Light Touch Partial deficits in the RUE;Partial deficits in the LUE   Sharp/Dull Partial deficits in the RUE;Partial deficits in the LUE   Additional Comments numbness and tingling in B/L hands    Proprioception   Proprioception No apparent deficits   Vision-Basic Assessment   Current Vision Other (Comment)  (no glasses; visual deficits to both eyes )   Visual History Glaucoma  (as per chart review )   Patient Visual Report Blurring of print when reading; Unable to keep objects in focus; Eye fatigue/eye pain/headache   Vision - Complex Assessment   Ocular Range of Motion WFL   Head Position WDL   Tracking Able to track stimulus in all quads without difficulty; Requires cues, head turns, or add eye shifts to track   Convergence Breaks at 8 from nose   Visual Fields Difficulty detecting stimulus with OD RUQ; Difficulty detecting stimulus with OD RLQ; Difficulty detecting stimulus with OS LUQ; Difficulty detecting stimulus with OS LLQ   Additional Comments signficant impairments to peripheral field of R eye; minimal to L eye    Cognition   Overall Cognitive Status Impaired   Arousal/Participation Alert; Cooperative   Attention Within functional limits   Orientation Level Oriented X4   Memory Decreased short term memory;Decreased recall of recent events;Decreased recall of precautions   Following Commands Follows one step commands without difficulty   Comments decreased insight to new deficits; impaired safety awareness; requires increased time to process and respond    Assessment   Limitation Decreased ADL status; Decreased UE ROM; Decreased UE strength;Decreased Safe judgement during ADL;Decreased cognition;Decreased endurance;Decreased sensation;Visual deficit; Decreased fine motor control;Decreased self-care trans;Decreased high-level ADLs   Prognosis Good;Fair   Assessment Pt is a [de-identified] y o  male seen for OT evaluation s/p admit to [facility] on 1/2/2020 w/ Acute renal failure superimposed on stage 4 chronic kidney disease  OT completed an extensive review of pt's medical and social history  Pt with episodes of left -sided chest pain for the last few weeks without exertion  The patient has an extensive cardiac history to include coronary artery disease status post CABG x3 vessels in 2016, paroxysmal atrial fibrillation, ischemic cardiomyopathy status post ICD implantation, CHF  Comorbidities affecting pt's functional performance at time of assessment include: GERD, glaucoma, hypertension, hyperlipidemia, benign prostatic hypertrophy, dementia, see additional PMH in pt chart  Personal factors affecting pt at time of IE include:steps to enter environment, difficulty performing ADLS, difficulty performing IADLS , limited insight into deficits, decreased initiation and engagement  and environment  Pt presents as poor historian-- to confirm with family  As per pt report, pt lives in a 2 [address] [facility] with 1st floor setup 1 SHERI (reports approx 8-10") using doorway for support  Lives with daughter and family  Pt reports independent with ADLs with the exception of shower chair placement/transfer  Ind with mobility household distances using rollator  Dtr provides c/S with community distances  Dtr completes all IADLs  Dtr drives  Sleeps in standard bed with LE elevated on pillows  Unable to access bathroom with rollator  Pt currently  requires Min A UB ADLs, Mod A LB ADLs, Min A toileting, and CGA mobility with rollator 2* the following deficits impacting occupational performance  Pt presents to OT below baseline due to the following performance deficits: visual deficits, weakness, decreased ROM, decreased strength, decreased balance, decreased tolerance, impaired FMC, impaired sensation, impaired depth perception, impaired attention, impaired initiation, impaired memory, impulsivity, decreased safety awareness and increased pain   Pt to benefit from continued skilled OT services while in the hospital to address deficits as defined above and maximize level of functional independence w ADL's and functional mobility  Occupational performance areas to address include: eating, grooming, bathing/shower, toilet hygiene, dressing, socialization, health maintenance, functional mobility, community mobility, clothing management and social participation  Pt with score of 45/100 on the Barthel Index  Based on OT evaluation, assessment(s), performance deficits listed, and current level of function, pt identified as a HIGH complexity evaluation  From OT standpoint, recommendation at time of d/c would be home OT with additional family support  Goals   Patient Goals to go home with my daughter    Plan   Treatment Interventions ADL retraining;Visual perceptual retraining;Functional transfer training;UE strengthening/ROM; Endurance training;Cognitive reorientation;Patient/family training;Equipment evaluation/education; Neuromuscular reeducation; Fine motor coordination activities; Compensatory technique education;Continued evaluation; Energy conservation; Activityengagement   Goal Expiration Date 01/17/20   OT Treatment Day 1   OT Frequency   (5-7x/wk)   Additional Treatment Session   Start Time 3338   End Time 0920   Treatment Assessment Pt seen for OT Txt s/p eval with focus on toileting (Min A- bladder only; reports difficulty moving bowels), oral care (S), functional mobility, functional transfers, and bed mobility  Pt limited by visual deficits, cognition, decreased insight to deficits , decreased STM, Safety awareness, pain, ROM, weakness, fatigue, endurance, activity tolerance , standing tolerance and static/dynamic standing balance    Pt educated on POC and benefits of OT/PT while on TCF, safe functional transfer techniques with appropriate body mechanics, fall prevention, and the appropriate use of AE/DME to improve functional performance   Pt would benefit from continued OT sessions to further address the above deficits to maximize independence and reduce caregiver burden  Additional Treatment Day 1   Recommendation   OT Discharge Recommendation Home OT  (with additional family support )   OT - OK to Discharge No   Barthel Index   Feeding 10   Bathing 0   Grooming Score 0   Dressing Score 5   Bladder Score 5   Bowels Score 10   Toilet Use Score 5   Transfers (Bed/Chair) Score 10   Mobility (Level Surface) Score 0   Stairs Score 0   Barthel Index Score 45         Goals STG achieved within 2 weeks Performance at Initial Evaluation 1/3/2020  Current Performance (last date completed)   Grooming while standing at sink S S seated 1/3 S seated    ADL transfers  MI S 1/3 S   Bathroom mobility - reports bathroom not accessible with rollator  S CGA 1/3 CGA with Rollator   UB ADL  MI Min A 1/3 Min A   LB ADL, AE PRN S Mod A 1/3 Mod A   Toileting/clothing management and hygiene S Min A 1/3 Min A- A with pants from floor   Dynamic standing balance for increased safety when completing purposeful tasks F+ P+ 1/3 P+   Increase standing tolerance for inc'd safety with standing purposeful tasks 4-7 min 2 min 1/3 2 min    Pt will demonstrate appropriate visual  compensatory strategies with ADL tasks/therapuetic activities    Ind / 0 VC's      Participate in therex 1-3x/week for inc'd overall stamina/activity tolerance for purposeful tasks To be completed   1/3 L shoulder 3-/5    Participate in further cognitive testing to assist with safe d/c planning To be completed   1/3 A&O x4 -- increased time required for processing    Tub/shower (+) GBs and shower chair  S Unable to assess    Kitchen mobility/light meal prep for inc'd independence and safety negotiating kitchen environment  S Unable to assess    Bed mobility with HOB flat  MI Min A 1/3 Min A sit<>sup        Lauro Zamora, MS, OTR/L

## 2020-01-03 NOTE — PHYSICAL THERAPY NOTE
Paula Latham is a [de-identified] y o  male initially admitted to [facility] on 12/29/2019 for Acute renal failure superimposed on stage 4 chronic kidney disease ([facility] [address] )  Pt transferred to [address] [place] [address] for rehab on 1/2/20  Pt  has a past medical history of A-fib ([facility] [address] ), [facility] CKD (chronic kidney disease) stage 4, GFR 15-29 ml/min ([facility]), GERD (gastroesophageal reflux disease), Glaucoma, Hypertension, and Renal disorder  In summary, guiding factors including patient history, examination of body sytem(s), clinical presentation and clinical decision making were considered  Pt presents with comorbid conditions that impact function, comorbid conditions that may limit ability to progress, context of current functional limitations as compared to the prior level of function, impaired prior level of function, limited physical/social support, participation restrictions, with living environment deficits, and advanced age  Pt also presents with impaired: Barthel Index Score, sensation ( Left 5 toes are numb and sore, fingers numb on bilateral hands), cognition, safety, skin condition, vision, hearing, vitals, BLE MMT strength, functional strength, endurance for activity, sit and stand balance, bed mobility, transfers, and gait abilities  Pt's right DF PROM WFL's  However, pt assessed to have right foot drop with subsequent steppage gait -> pt stated being unsure what caused this; can't remember if he has a brace or not  Clinical presentation is with unstable and unpredictable characteristics  The assigned level of complexity is: High  Pt will benefit from skilled PT tx intervention to maximize safe mobility in prep for discharge  Prior to hospitalization, pt ambulated with a rollator household distances with MOD I and short community distances with Supervision from daughter            Goals STG achieved within  2 weeks Performance at Initial Evaluation (01/03/20) Current Performance (last date completed)   Supine to sit transitions to increase ease of transfer, allow pt to get out of bed, and decrease caregiver burden   MOD I Supervision   - HOB flat  - No rail 01/03/20   Sit to supine transitions to increase ease of transfer, allow pt to get back into bed, and decrease caregiver burden MOD I Supervision   - HOB flat  - No rail 01/03/20   Functional transfers to facilitate safe return to previous living environment     MOD I Sit <->stand tranfers: CG A    SPT with Rollator: CG A 01/03/20   Ambulation with rollator for 50 feet   ( for safe household distance ambulation)    MOD I Ambulate with rollator 17 feet with CG A 01/03/20   Ambulation with rollator for > or = 100 feet ( to initiate short community distance ambulation, Supervised by daughter)    Supervision  Unable to ambulate > 17 feet with rollator ; due to very low blood pressure 01/03/20      Ascend/descend a curb step, using appropriate AD and method, no handrails, for safe access to previous living environment CG A Deferred today -> due to very low BP 01/03/20  Deferrred         Transfers: recliner, unsecured low chair, EOB    BLE MMT Strength  Right hip flexion: 2/5  Left hip flexion: 3- to 3/5    Right knee /:3 to 3+/5  Left knee /: 3+ to 4-/5    Right ankle DF: 0/5 ( + foot drop)  Left ankle DF: 4-/5    Right ankle PF: 3-/5  Left ankle PF: 4-/4    Vitals  Seated at rest: BP 92/52, HR 70, SPO2 (RA) 94%  After ambulating with a Rollator for 17 feet ( seated): BP 85/71, HR 75, SPO2 (RA) 96%

## 2020-01-03 NOTE — PROGRESS NOTES
Medication Regimen Review (MRR)    To promote positive health outcomes and reduce adverse consequences the patient's medication therapy has been reviewed by a pharmacist for the following potential problems:   1   documented indication and therapeutic benefits  2   appropriate dose, frequency, route, and duration of therapy  3   medication interactions, side effects, and allergies  4   medication or transcription errors  Medications are also reviewed for appropriate monitoring, duplicate therapy, and dose reduction  Based on the review please see the following recommendations  Patient information:    The patient is [de-identified] y o  admitted for rehab      Wt Readings from Last 1 Encounters:   01/03/20 75 7 kg (166 lb 14 2 oz)       Lab Results   Component Value Date    CALCIUM 9 4 01/02/2020    K 3 9 01/02/2020    CO2 23 01/02/2020     01/02/2020    BUN 60 (H) 01/02/2020    CREATININE 3 21 (H) 01/02/2020         Recommendations: There are no recommendations from the pharmacy department at this time      Flavio Joshi, Pharmacist  [phone]

## 2020-01-03 NOTE — SOCIAL WORK
LOS: 1, Bundle: Yes Renal failure 14-17 days, 30 day re-admission: No    Patient admitted with chest pain and acute renal failure  SW met with patient's dtr Jay Villa and dr Dayna Rivas by phone to review admission packet and consents  Consents signed, [facility]  Rohit Room is pt's POA  Jayted Villa is agreeable to a [facility] on Tuesday at 2:00 PM     Prior to hospitalization, pt lived in a [address] [place] [address] with [facility]  Pt was independent with ambulation with RW, needed assistance with bathing and sometimes dressing  Dtr provides transportation  He has a hx of using [facility] and dtr would like to use again  He also goes to adult day care at [facility] 5 days/week  He previously had therapy with [facility] at adult day care  Dtr prepares him meals to take with him  PCP is Dr Lorena Wong and pharmacy is [facility]      Dtr informed SW that they are planning to move in 30 days  She also informed SW that pt has an eye appointment on 1/15 with [facility]  She is inquiring if [facility] could arrange for pt to go over there for eye surgery  SW informed dtr that this would be discussed with attending and most likely would need to be done as an outpatient  SW will continue to follow, no further questions/concerns at this point

## 2020-01-03 NOTE — PROGRESS NOTES
RN was called into pt's room, pt stated to RN that his daughter put his clothes in his bag in closet  Along with his wallet  He checked his wallet and stated his money is gone  RN called in second RN to ask pt to look at wallet as when RN dressed pt the walled was never removed from pt's left front pocket  When RN called report to [facility], the receiving RN was made aware of the pts valuables and to look for them upon arrival   RN offered to call his daughter to speak with her regarding his concerns, he declined

## 2020-01-03 NOTE — PLAN OF CARE
Problem: OCCUPATIONAL THERAPY ADULT  Goal: Performs self-care activities at highest level of function for planned discharge setting  See evaluation for individualized goals  Description  Treatment Interventions: ADL retraining, Visual perceptual retraining, Functional transfer training, UE strengthening/ROM, Endurance training, Cognitive reorientation, Patient/family training, Equipment evaluation/education, Neuromuscular reeducation, Fine motor coordination activities, Compensatory technique education, Continued evaluation, Energy conservation, Activityengagement          See flowsheet documentation for full assessment, interventions and recommendations  Outcome: Progressing  Note:   Limitation: Decreased ADL status, Decreased UE ROM, Decreased UE strength, Decreased Safe judgement during ADL, Decreased cognition, Decreased endurance, Decreased sensation, Visual deficit, Decreased fine motor control, Decreased self-care trans, Decreased high-level ADLs  Prognosis: Good, Fair  Assessment: Pt is a [de-identified] y o  male seen for OT evaluation s/p admit to [facility] on 1/2/2020 w/ Acute renal failure superimposed on stage 4 chronic kidney disease  OT completed an extensive review of pt's medical and social history  Pt with episodes of left -sided chest pain for the last few weeks without exertion  The patient has an extensive cardiac history to include coronary artery disease status post CABG x3 vessels in 2016, paroxysmal atrial fibrillation, ischemic cardiomyopathy status post ICD implantation, CHF  Comorbidities affecting pt's functional performance at time of assessment include: GERD, glaucoma, hypertension, hyperlipidemia, benign prostatic hypertrophy, dementia, see additional PMH in pt chart  Personal factors affecting pt at time of IE include:steps to enter environment, difficulty performing ADLS, difficulty performing IADLS , limited insight into deficits, decreased initiation and engagement  and environment   Pt presents as poor historian-- to confirm with family  As per pt report, pt lives in a [address] [place] [address] with 1st floor setup 1 SHERI (reports approx 8-10") using doorway for support  Lives with daughter and family  Pt reports independent with ADLs with the exception of shower chair placement/transfer  Ind with mobility household distances using rollator  Dtr provides c/S with community distances  Dtr completes all IADLs  Dtr drives  Sleeps in standard bed with LE elevated on pillows  Unable to access bathroom with rollator  Pt currently  requires Min A UB ADLs, Mod A LB ADLs, Min A toileting, and CGA mobility with rollator 2* the following deficits impacting occupational performance  Pt presents to OT below baseline due to the following performance deficits: visual deficits, weakness, decreased ROM, decreased strength, decreased balance, decreased tolerance, impaired FMC, impaired sensation, impaired depth perception, impaired attention, impaired initiation, impaired memory, impulsivity, decreased safety awareness and increased pain  Pt to benefit from continued skilled OT services while in the hospital to address deficits as defined above and maximize level of functional independence w ADL's and functional mobility  Occupational performance areas to address include: eating, grooming, bathing/shower, toilet hygiene, dressing, socialization, health maintenance, functional mobility, community mobility, clothing management and social participation  Pt with score of 45/100 on the Barthel Index   Based on OT evaluation, assessment(s), performance deficits liste     OT Discharge Recommendation: Home OT(with additional family support )  OT - OK to Discharge: No

## 2020-01-03 NOTE — PLAN OF CARE
Problem: PHYSICAL THERAPY ADULT  Goal: Performs mobility at highest level of function for planned discharge setting  See evaluation for individualized goals  Description  Treatment/Interventions: ADL retraining, Functional transfer training, LE strengthening/ROM, Elevations, Therapeutic exercise, Endurance training, Cognitive reorientation, Patient/family training, Equipment eval/education, Bed mobility, Gait training, Compensatory technique education, Continued evaluation, Spoke to MD, Spoke to nursing, Spoke to case management, Spoke to advanced practitioner, OT, Family  Equipment Recommended: (TBD)       See flowsheet documentation for full assessment, interventions and recommendations  Note:   Prognosis: Good  Problem List: Decreased strength, Decreased range of motion, Decreased endurance, Impaired balance, Decreased mobility, Decreased coordination, Decreased safety awareness, Impaired judgement, Decreased cognition, Impaired vision, Impaired hearing, Impaired sensation, Decreased skin integrity, Pain, Orthopedic restrictions  Assessment: Maya Ansari is a [de-identified] y o  male initially admitted to [facility] on 12/29/2019 for Acute renal failure superimposed on stage 4 chronic kidney disease ([facility] )  Pt transferred to [address] [place] [address] for rehab on 1/2/20  Pt  has a past medical history of [facility] ), CHF (congestive heart failure) ([facility] ), CKD (chronic kidney disease) stage 4, GFR 15-29 ml/min ([facility]), GERD (gastroesophageal reflux disease), Glaucoma, Hypertension, and Renal disorder  In summary, guiding factors including patient history, examination of body sytem(s), clinical presentation and clinical decision making were considered   Pt presents with comorbid conditions that impact function, comorbid conditions that may limit ability to progress, context of current functional limitations as compared to the prior level of function, impaired prior level of function, limited physical/social support, participation restrictions, with living environment deficits, and advanced age  Pt also presents with impaired: cognition, safety, skin condition, vision, hearing, vitals, BLE MMT strength, functional strength, endurance for activity, sit and stand balance, bed mobility, transfers, and gait abilities  Pt's right DF PROM WFL's  However, pt assessed to have right foot drop with subsequent steppage gait -> pt stated being unsure what caused this; can't remember if he has a brace or not  Clinical presentation is with unstable and unpredictable characteristics  The assigned level of complexity is: High  Pt will benefit from skilled PT tx intervention to maximize safe mobility in prep for discharge  Barriers to Discharge: Inaccessible home environment, Decreased caregiver support(Home alone at times, below functional baseline)                See flowsheet documentation for full assessment

## 2020-01-04 PROCEDURE — 97530 THERAPEUTIC ACTIVITIES: CPT

## 2020-01-04 PROCEDURE — 97535 SELF CARE MNGMENT TRAINING: CPT

## 2020-01-04 PROCEDURE — 97110 THERAPEUTIC EXERCISES: CPT

## 2020-01-04 RX ORDER — METOPROLOL SUCCINATE 25 MG/1
25 TABLET, EXTENDED RELEASE ORAL DAILY
Status: DISCONTINUED | OUTPATIENT
Start: 2020-01-05 | End: 2020-01-17 | Stop reason: HOSPADM

## 2020-01-04 RX ORDER — ISOSORBIDE DINITRATE 10 MG/1
20 TABLET ORAL
Status: DISCONTINUED | OUTPATIENT
Start: 2020-01-04 | End: 2020-01-08

## 2020-01-04 RX ADMIN — TAMSULOSIN HYDROCHLORIDE 0.8 MG: 0.4 CAPSULE ORAL at 17:24

## 2020-01-04 RX ADMIN — ISOSORBIDE DINITRATE 20 MG: 10 TABLET ORAL at 17:24

## 2020-01-04 RX ADMIN — ISOSORBIDE DINITRATE 20 MG: 10 TABLET ORAL at 08:08

## 2020-01-04 RX ADMIN — GABAPENTIN 300 MG: 300 CAPSULE ORAL at 08:08

## 2020-01-04 RX ADMIN — METOPROLOL SUCCINATE 25 MG: 25 TABLET, EXTENDED RELEASE ORAL at 08:08

## 2020-01-04 RX ADMIN — WARFARIN SODIUM 3 MG: 3 TABLET ORAL at 17:24

## 2020-01-04 RX ADMIN — ISOSORBIDE DINITRATE 20 MG: 10 TABLET ORAL at 12:28

## 2020-01-04 RX ADMIN — FUROSEMIDE 40 MG: 40 TABLET ORAL at 08:08

## 2020-01-04 RX ADMIN — ATORVASTATIN CALCIUM 40 MG: 80 TABLET, FILM COATED ORAL at 17:24

## 2020-01-04 RX ADMIN — PANTOPRAZOLE SODIUM 40 MG: 40 TABLET, DELAYED RELEASE ORAL at 06:26

## 2020-01-04 NOTE — OCCUPATIONAL THERAPY NOTE
Occupational Therapy Treatment Note     Patient Name: Paula DONOHUEZUTONIY Date: 1/4/2020  Problem List  Principal Problem:    Acute renal failure superimposed on stage 4 chronic kidney disease (HCC)  Active Problems:    Chronic systolic congestive heart failure (HCC)    GERD (gastroesophageal reflux disease)    Paroxysmal atrial fibrillation (HCC)    BPH (benign prostatic hyperplasia)    PAD (peripheral artery disease) ([facility] )    Anemia due to stage 4 chronic kidney disease ([facility] )    Mixed hyperlipidemia    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 ([facility] )           TIME: 9:00-9:40 ( 40 minutes )  COGNITION: impaired, decreased short term memory, decreased recall of safety precautions, requires increased time and repeat cues for cognitive processing   PRECAUTIONS: bed alarm, chair alarm, cognitive, fall risk   PAIN: denies pain  GOAL EXPIRATION: 1/17/2020  TREATMENT DAY: 2  DISCHARGE RECOMMENDATION: home OT and family support   SESSION DETAILS/ASSESSMENT: Pt seen for OT txt  Pt completed ADL including dressing and grooming tasks  Pt continues to require cues throughout for safety secondary to limited insight, 1 instance of not locking brakes on rollator  Pt completed cognitive tasks for pairing like items together, able to complete with min cues for problem solving  Deficits remain in the areas of functional strength, safety and cognition, activity tolerance, and endurance  POC remains appropriate and Pt progressing, continue OT       Goals STG achieved within 2 weeks Performance at Initial Evaluation 1/3/2020  Current Performance (last date completed)   Grooming while standing at sink S S seated 1/4 S seated    ADL transfers  MI S 1/4 S standard height, Orly from lower surface (couch)   Bathroom mobility - reports bathroom not accessible with rollator  S CGA 1/4 CGA with Rollator   UB ADL  MI Min A 1/4 Min A   LB ADL, AE PRN S Mod A 1/4 Mod A   Toileting/clothing management and hygiene S Min A 1/3 Min A- A with pants from floor   Dynamic standing balance for increased safety when completing purposeful tasks F+ P+ 1/4 P+   Increase standing tolerance for inc'd safety with standing purposeful tasks 4-7 min 2 min 1/4 2 min    Pt will demonstrate appropriate visual compensatory strategies with ADL tasks/therapuetic activities    Ind / 0 VC's        Participate in therex 1-3x/week for inc'd overall stamina/activity tolerance for purposeful tasks To be completed    1/3 L shoulder 3-/5    Participate in further cognitive testing to assist with safe d/c planning To be completed    1/3 A&O x4 -- increased time required for processing    Tub/shower (+) GBs and shower chair  S Unable to assess     Kitchen mobility/light meal prep for inc'd independence and safety negotiating kitchen environment  S Unable to assess     Bed mobility with HOB flat  MI Min A 1/4 Min A sit<>sup, LE management

## 2020-01-04 NOTE — PLAN OF CARE
Problem: PHYSICAL THERAPY ADULT  Goal: Performs mobility at highest level of function for planned discharge setting  See evaluation for individualized goals  Description  Treatment/Interventions: ADL retraining, Functional transfer training, LE strengthening/ROM, Elevations, Therapeutic exercise, Endurance training, Cognitive reorientation, Patient/family training, Equipment eval/education, Bed mobility, Gait training, Compensatory technique education, Continued evaluation, Spoke to MD, Spoke to nursing, Spoke to case management, Spoke to advanced practitioner, OT, Family  Equipment Recommended: (TBD)       See flowsheet documentation for full assessment, interventions and recommendations  Outcome: Progressing  Note:   Prognosis: Good  Problem List: Decreased strength, Decreased range of motion, Decreased endurance, Impaired balance, Decreased mobility, Decreased coordination, Decreased safety awareness, Impaired judgement, Decreased cognition, Impaired vision, Impaired hearing, Impaired sensation, Decreased skin integrity, Pain, Orthopedic restrictions  Assessment: Maik tolerated all activity well with increased ambulatory distance  Pt demonstrated excellent RW mgmt  Pt continues to express RLE steppage gait for dropfoot  Barriers to Discharge: Inaccessible home environment, Decreased caregiver support     Recommendation: Short-term skilled PT     PT - OK to Discharge: Yes    See flowsheet documentation for full assessment

## 2020-01-04 NOTE — PLAN OF CARE
Problem: OCCUPATIONAL THERAPY ADULT  Goal: Performs self-care activities at highest level of function for planned discharge setting  See evaluation for individualized goals  Description  Treatment Interventions: ADL retraining, Visual perceptual retraining, Functional transfer training, UE strengthening/ROM, Endurance training, Cognitive reorientation, Patient/family training, Equipment evaluation/education, Neuromuscular reeducation, Fine motor coordination activities, Compensatory technique education, Continued evaluation, Energy conservation, Activityengagement          See flowsheet documentation for full assessment, interventions and recommendations  Outcome: Progressing  Note:   Limitation: Decreased ADL status, Decreased UE ROM, Decreased UE strength, Decreased Safe judgement during ADL, Decreased cognition, Decreased endurance, Decreased sensation, Visual deficit, Decreased fine motor control, Decreased self-care trans, Decreased high-level ADLs  Prognosis: Good, Fair  Assessment: Pt seen for OT txt  Pt completed ADL including dressing and grooming tasks  Pt continues to require cues throughout for safety secondary to limited insight, 1 instance of not locking brakes on rollator  Pt completed cognitive tasks for pairing like items together, able to complete with min cues for problem solving  Deficits remain in the areas of functional strength, safety and cognition, activity tolerance, and endurance  POC remains appropriate and Pt progressing, continue OT        OT Discharge Recommendation: Home OT(with additional family support )  OT - OK to Discharge: No

## 2020-01-04 NOTE — TREATMENT PLAN
Renal function and weight has improved on current diuretic dosing  Continue lasix 40mg daily, 1 5L fluid restriction/day and monitor daily weight  Next BMP ordered on Monday, 1/6/20  Nephrology to see in follow up Monday

## 2020-01-04 NOTE — PHYSICAL THERAPY NOTE
PT Tx Note 38 mins   01/04/20 1315   Pain Assessment   Pain Assessment 0-10   Pain Score 5   Pain Type Chronic pain   Pain Location Knee; Shoulder; Foot   Pain Orientation Right   Pain Onset Ongoing   Clinical Progression Not changed   Effect of Pain on Daily Activities amb   Patient's Stated Pain Goal No pain   Hospital Pain Intervention(s) Repositioned   Diversional Activities Television   Response to Interventions tolerated session well   Restrictions/Precautions   Weight Bearing Precautions Per Order No   Other Precautions Fall Risk;Hard of hearing   General   Chart Reviewed Yes   Response to Previous Treatment Patient with no complaints from previous session  Family/Caregiver Present No   Cognition   Overall Cognitive Status Impaired   Arousal/Participation Alert; Cooperative   Attention Within functional limits   Orientation Level Oriented to person;Oriented to place   Memory Decreased short term memory;Decreased recall of recent events   Following Commands Follows one step commands without difficulty   Subjective   Subjective "I gotta do what I've got to do ,"   Bed Mobility   Additional Comments encountered seated in recliner chair   Transfers   Sit to Stand   (CGA)   Additional items Increased time required   Stand to Sit   (CGA)   Additional items Increased time required   Stand pivot   (CGA)   Additional Comments cues for safety   Ambulation/Elevation   Gait pattern Improper Weight shift; Steppage;Decreased R stance;R Foot drag   Gait Assistance   (CGA)   Additional items Assist x 1;Verbal cues   Assistive Device   (Rollator)   Distance 50ft x 2   Balance   Static Sitting Fair   Dynamic Sitting Fair   Static Standing Fair   Dynamic Standing Fair -   Ambulatory Fair   Endurance Deficit   Endurance Deficit Yes   Endurance Deficit Description fatigue limited   Activity Tolerance   Activity Tolerance Patient limited by fatigue;Patient limited by pain   Medical Staff Made Aware JUDITH Ku RN   Exercises   Glute Sets 10 reps;Bilateral;Sitting   Hip Flexion 10 reps;Bilateral;Sitting   Hip Abduction 10 reps;Bilateral;Sitting   Knee AROM Long Arc Quad 10 reps;Bilateral;Sitting   Ankle Pumps 10 reps;Bilateral;Sitting   Assessment   Prognosis Good   Problem List Decreased strength;Decreased range of motion;Decreased endurance; Impaired balance;Decreased mobility; Decreased coordination;Decreased safety awareness; Impaired judgement;Decreased cognition; Impaired vision; Impaired hearing; Impaired sensation;Decreased skin integrity;Pain;Orthopedic restrictions   Assessment Maik tolerated all activity well with increased ambulatory distance  Pt demonstrated excellent RW mgmt  Pt continues to express RLE steppage gait for dropfoot     Barriers to Discharge Inaccessible home environment;Decreased caregiver support   Goals   Patient Goals to go home   STG Expiration Date   (cont Poc)   Short Term Goal #1 see grid   PT Treatment Day 2   Plan   Treatment/Interventions   (cont POC)   Progress Slow progress, decreased activity tolerance   PT Frequency   (5-7x/wk)   Recommendation   Recommendation Short-term skilled PT   PT - OK to Discharge Yes        Goals STG achieved within  2 weeks Performance at Initial Evaluation (01/03/20) Current Performance (last date completed)   Supine to sit transitions to increase ease of transfer, allow pt to get out of bed, and decrease caregiver burden    MOD I Supervision   - HOB flat  - No rail 01/04/20   Sit to supine transitions to increase ease of transfer, allow pt to get back into bed, and decrease caregiver burden MOD I Supervision   - HOB flat  - No rail 01/04/20   Functional transfers to facilitate safe return to previous living environment     MOD I Sit <->stand tranfers: CG A     SPT with Rollator: CG A 01/04/20   Ambulation with rollator for 50 feet   ( for safe household distance ambulation)     MOD I Ambulate with rollator 17 feet with CG A 01/04/20   Ambulation with rollator for > or = 100 feet ( to initiate short community distance ambulation, Supervised by daughter)     Supervision  Unable to ambulate > 17 feet with rollator ; due to very low blood pressure 01/03/20       Ascend/descend a curb step, using appropriate AD and method, no handrails, for safe access to previous living environment CG A Deferred today -> due to very low BP 01/03/20  Deferrred                   Dominic Crawford, PT

## 2020-01-05 RX ADMIN — FUROSEMIDE 40 MG: 40 TABLET ORAL at 08:21

## 2020-01-05 RX ADMIN — ATORVASTATIN CALCIUM 40 MG: 80 TABLET, FILM COATED ORAL at 17:04

## 2020-01-05 RX ADMIN — METOPROLOL SUCCINATE 25 MG: 25 TABLET, EXTENDED RELEASE ORAL at 08:21

## 2020-01-05 RX ADMIN — GABAPENTIN 300 MG: 300 CAPSULE ORAL at 08:21

## 2020-01-05 RX ADMIN — ISOSORBIDE DINITRATE 20 MG: 10 TABLET ORAL at 08:21

## 2020-01-05 RX ADMIN — TAMSULOSIN HYDROCHLORIDE 0.8 MG: 0.4 CAPSULE ORAL at 17:03

## 2020-01-05 RX ADMIN — PANTOPRAZOLE SODIUM 40 MG: 40 TABLET, DELAYED RELEASE ORAL at 06:04

## 2020-01-05 RX ADMIN — WARFARIN SODIUM 3 MG: 3 TABLET ORAL at 17:04

## 2020-01-06 ENCOUNTER — EPISODE CHANGES (OUTPATIENT)
Dept: CASE MANAGEMENT | Facility: HOSPITAL | Age: 80
End: 2020-01-06

## 2020-01-06 LAB
ANION GAP SERPL CALCULATED.3IONS-SCNC: 13 MMOL/L (ref 5–14)
BUN SERPL-MCNC: 65 MG/DL (ref 5–25)
CALCIUM SERPL-MCNC: 9.6 MG/DL (ref 8.4–10.2)
CHLORIDE SERPL-SCNC: 106 MMOL/L (ref 97–108)
CO2 SERPL-SCNC: 18 MMOL/L (ref 22–30)
CREAT SERPL-MCNC: 3.39 MG/DL (ref 0.7–1.5)
ERYTHROCYTE [DISTWIDTH] IN BLOOD BY AUTOMATED COUNT: 14 %
GFR SERPL CREATININE-BSD FRML MDRD: 16 ML/MIN/1.73SQ M
GLUCOSE P FAST SERPL-MCNC: 93 MG/DL (ref 70–99)
GLUCOSE SERPL-MCNC: 93 MG/DL (ref 70–99)
HCT VFR BLD AUTO: 31 % (ref 41–53)
HGB BLD-MCNC: 10.3 G/DL (ref 13.5–17.5)
INR PPP: 2.6 (ref 0.91–1.09)
MCH RBC QN AUTO: 32.4 PG (ref 26–34)
MCHC RBC AUTO-ENTMCNC: 33.4 G/DL (ref 31–36)
MCV RBC AUTO: 97 FL (ref 80–100)
PLATELET # BLD AUTO: 191 THOUSANDS/UL (ref 150–450)
PMV BLD AUTO: 8.2 FL (ref 8.9–12.7)
POTASSIUM SERPL-SCNC: 4 MMOL/L (ref 3.6–5)
PROTHROMBIN TIME: 28.1 SECONDS (ref 9.8–12)
RBC # BLD AUTO: 3.19 MILLION/UL (ref 4.5–5.9)
SODIUM SERPL-SCNC: 137 MMOL/L (ref 137–147)
WBC # BLD AUTO: 4.1 THOUSAND/UL (ref 4.5–11)

## 2020-01-06 PROCEDURE — 97110 THERAPEUTIC EXERCISES: CPT

## 2020-01-06 PROCEDURE — 99232 SBSQ HOSP IP/OBS MODERATE 35: CPT | Performed by: INTERNAL MEDICINE

## 2020-01-06 PROCEDURE — 97530 THERAPEUTIC ACTIVITIES: CPT

## 2020-01-06 PROCEDURE — 85027 COMPLETE CBC AUTOMATED: CPT | Performed by: FAMILY MEDICINE

## 2020-01-06 PROCEDURE — 97116 GAIT TRAINING THERAPY: CPT

## 2020-01-06 PROCEDURE — 85610 PROTHROMBIN TIME: CPT | Performed by: FAMILY MEDICINE

## 2020-01-06 PROCEDURE — 80048 BASIC METABOLIC PNL TOTAL CA: CPT | Performed by: FAMILY MEDICINE

## 2020-01-06 RX ORDER — DIAPER,BRIEF,INFANT-TODD,DISP
EACH MISCELLANEOUS 4 TIMES DAILY PRN
Status: DISCONTINUED | OUTPATIENT
Start: 2020-01-06 | End: 2020-01-17 | Stop reason: HOSPADM

## 2020-01-06 RX ORDER — DIPHENHYDRAMINE HCL 25 MG
25 TABLET ORAL ONCE
Status: COMPLETED | OUTPATIENT
Start: 2020-01-06 | End: 2020-01-06

## 2020-01-06 RX ORDER — DIPHENHYDRAMINE HCL 25 MG
25 TABLET ORAL EVERY 6 HOURS PRN
Status: DISCONTINUED | OUTPATIENT
Start: 2020-01-06 | End: 2020-01-17 | Stop reason: HOSPADM

## 2020-01-06 RX ADMIN — ISOSORBIDE DINITRATE 20 MG: 10 TABLET ORAL at 12:46

## 2020-01-06 RX ADMIN — ISOSORBIDE DINITRATE 20 MG: 10 TABLET ORAL at 18:13

## 2020-01-06 RX ADMIN — HYDROCORTISONE: 1 CREAM TOPICAL at 14:29

## 2020-01-06 RX ADMIN — PANTOPRAZOLE SODIUM 40 MG: 40 TABLET, DELAYED RELEASE ORAL at 06:43

## 2020-01-06 RX ADMIN — ATORVASTATIN CALCIUM 40 MG: 80 TABLET, FILM COATED ORAL at 18:14

## 2020-01-06 RX ADMIN — ISOSORBIDE DINITRATE 20 MG: 10 TABLET ORAL at 09:03

## 2020-01-06 RX ADMIN — WARFARIN SODIUM 3 MG: 3 TABLET ORAL at 18:14

## 2020-01-06 RX ADMIN — TAMSULOSIN HYDROCHLORIDE 0.8 MG: 0.4 CAPSULE ORAL at 18:13

## 2020-01-06 RX ADMIN — FUROSEMIDE 40 MG: 40 TABLET ORAL at 09:03

## 2020-01-06 RX ADMIN — DIPHENHYDRAMINE HCL 25 MG: 25 TABLET ORAL at 03:34

## 2020-01-06 RX ADMIN — GABAPENTIN 300 MG: 300 CAPSULE ORAL at 09:03

## 2020-01-06 RX ADMIN — DIPHENHYDRAMINE HCL 25 MG: 25 TABLET ORAL at 14:28

## 2020-01-06 NOTE — PHYSICAL THERAPY NOTE
Physical Therapy Daily Treatment Note    Patient's Name: Diana Mata    Admitting Diagnosis  Renal failure [N19]    Problem List  Patient Active Problem List   Diagnosis    Periorbital edema    Chronic systolic congestive heart failure (HCC)    Renal disorder    GERD (gastroesophageal reflux disease)    Paroxysmal atrial fibrillation (HCC)    HTN (hypertension)    Acute renal failure superimposed on stage 4 chronic kidney disease (HCC)    CKD [facility]ronic [facility]), [facility] IV [facility]    Secondary hyperparathyroidism ([facility] )    Hypercalcemia    BPH (benign prostatic hyperplasia)    [facility]per[facility]) [facility]    Hyperkalemia    Age-related cognitive decline    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)    Chronic kidney disease-mineral and bone disorder    Dementia with psychosis ([facility]    Chronic atrial fibrillation    Macular atrophy, retinal    Chest pain    Generalized abdominal pain    MGUS (monoclonal gammopathy of unknown significance)    Pulmonary nodule    Ambulatory dysfunction    Coronary artery disease    Mixed hyperlipidemia    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       Past Medical History  Past Medical History:   Diagnosis Date    [facility]fib [facility]     CHF (congestive heart failure) ([facility]     CKD (chronic kidney disease) stage 4, GFR 15-29 ml/min ([facility])     GERD (gastroesophageal reflux disease)     Glaucoma     History of transfusion     Hypertension     Renal disorder        Past Surgical History  Past Surgical History:   Procedure Laterality Date    ABDOMINAL SURGERY      CARDIAC PACEMAKER PLACEMENT      EYE SURGERY      HERNIA REPAIR      Doesn't recall, pt states he may have      VASCULAR SURGERY         Vitals:    01/06/20 0059 01/06/20 0517 01/06/20 0600 01/06/20 0615   BP:  126/61  98/58   BP Location:    Left arm   Pulse:    55   Resp:    18   Temp:    98 6 °F (37 °C)   TempSrc:    Temporal   SpO2: 95%   91% Weight:   71 8 kg (158 lb 4 6 oz)    Height:         Total PT treatment Time: 55 minutes     01/06/20 1030   Pain Assessment   Pain Assessment No/denies pain   Pain Score 7   Pain Type Chronic pain   Pain Location Foot   Pain Orientation Right   Restrictions/Precautions   Weight Bearing Precautions Per Order No   Other Precautions Fall Risk;Hard of hearing   General   Chart Reviewed Yes   Response to Previous Treatment Patient with no complaints from previous session  Family/Caregiver Present No   Cognition   Overall Cognitive Status Impaired   Arousal/Participation Alert; Cooperative   Attention Within functional limits   Orientation Level Oriented to person;Oriented to place   Memory Decreased short term memory;Decreased recall of recent events   Following Commands Follows one step commands without difficulty   Subjective   Subjective "I will try the curb step today"   Transfers   Sit to Stand 5  Supervision   Additional items Armrests; Increased time required;Verbal cues   Stand to Sit 5  Supervision   Additional items Armrests; Increased time required;Verbal cues   Stand pivot 5  Supervision   Additional items Armrests; Increased time required;Verbal cues   Additional Comments Rollator used for transfers; cues for safety   Ambulation/Elevation   Gait pattern Improper Weight shift;Decreased foot clearance; Steppage; Excessively slow   Gait Assistance 5  Supervision   Additional items Verbal cues   Assistive Device   (rollator)   Distance 50' x3; 76'   Stair Management Assistance 4  Minimal assist   Additional items Assist x 1;Verbal cues; Tactile cues; Increased time required   Stair Management Technique   (Use of door frame and rollator, forward step to)   Number of Stairs   (1 curb step)   Balance   Static Sitting Fair +   Dynamic Sitting Fair   Static Standing Fair   Dynamic Standing Fair -   Ambulatory Fair -   Endurance Deficit   Endurance Deficit Yes   Endurance Deficit Description fatigue limiting ambulation distance   Activity Tolerance   Activity Tolerance Patient limited by fatigue   Exercises   Heelslides Sitting;15 reps  (2 sets, red tb)   Hip Flexion Sitting;15 reps;AROM; Bilateral  (2 sets)   Hip Abduction Sitting;15 reps  (yellow TB; 2 sets)   Hip Adduction Sitting;15 reps  (2 sets)   Knee AROM Long Arc Quad Sitting;15 reps  (2 sets)   Assessment   Prognosis Good   Problem List Decreased strength;Decreased range of motion;Decreased endurance; Impaired balance; Impaired judgement;Decreased safety awareness;Decreased skin integrity;Pain;Orthopedic restrictions; Impaired hearing   Assessment Pt seen today for a PT treatment session, found seated in recliner alert and agreeable to therapy  He is reporting pain in his foot, leg and shoulder  He continues to be limited by fatigue, able to tolerate 75ft ambulation trial today  He was able to demonstrate his normal technique for curb step negotiation with kanchan RUBIN, he utilizes door frame and rollator for this  At end of session he was left in recliner with all needs in reach and all questions answered  Barriers to Discharge Inaccessible home environment;Decreased caregiver support   Goals   Patient Goals work on curb step   Short Term Goal #1 grid   PT Treatment Day 3   Plan   Treatment/Interventions Functional transfer training;LE strengthening/ROM; Elevations; Therapeutic exercise; Endurance training;Patient/family training;Equipment eval/education; Bed mobility;Gait training   Progress Progressing toward goals   PT Frequency   (5-7x/wk)     Goals STG achieved within  2 weeks Performance at Initial Evaluation (01/03/20) Current Performance (last date completed)   Supine to sit transitions to increase ease of transfer, allow pt to get out of bed, and decrease caregiver burden    MOD I Supervision   - HOB flat  - No rail 01/04/20   Sit to supine transitions to increase ease of transfer, allow pt to get back into bed, and decrease caregiver burden MOD I Supervision   - HOB flat  - No rail 01/04/20   Functional transfers to facilitate safe return to previous living environment     MOD I Sit <->stand tranfers: CG A     SPT with Rollator: CG A 01/06/20   Ambulation with rollator for 50 feet   ( for safe household distance ambulation)     MOD I Ambulate with rollator 17 feet with CG A 01/06/20   Ambulation with rollator for > or = 100 feet ( to initiate short community distance ambulation, Supervised by daughter)     Supervision  Unable to ambulate > 17 feet with rollator ; due to very low blood pressure 01/06/20       Ascend/descend a curb step, using appropriate AD and method, no handrails, for safe access to previous living environment CG A Deferred today -> due to very low BP 1/6/20     Hermelinda Martinez PT

## 2020-01-06 NOTE — OCCUPATIONAL THERAPY NOTE
Occupational Therapy Treatment Note     Patient Name: Lefty Marie    Problem List  Principal Problem:    Acute renal failure superimposed on stage 4 chronic kidney disease (HCC)  Active Problems:    Chronic systolic congestive heart failure (HCC)    GERD (gastroesophageal reflux disease)    Paroxysmal atrial fibrillation (HCC)    BPH (benign prostatic hyperplasia)    PAD (peripheral artery disease) ([facility] )    An[facility] due to stage 4 chronic [facility] disease ([facility] )    Mixed hyperlipidemia    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 ([facility] )        TIME: [phone]) 56 minutes  COGNITION: impaired, decreased short term memory, decreased recall of safety precautions, requires increased time and repeat cues for cognitive processing   PRECAUTIONS: bed alarm, chair alarm, cognitive, fall risk   PAIN: 8/10 pain to right leg  GOAL EXPIRATION: 1/17/2020  TREATMENT DAY: 3  DISCHARGE RECOMMENDATION: home OT and family support     HOME SET UP INFO:  · Lives in a house with 1 SHERI (high) doorway for support  · Tub/shower unit + GB, + Shower chair  · Does NOT take rollator into bathroom  · DME:  SENG Arango  · Lives with daughter  · Daughter provides CS for community support   · -   · Sleeps in reg bed with LE elevated on pillows    SESSION DETAILS/ASSESSMENT: Patient seen this date for OT with focus on goals as set by OTR  Patient agreeable to skilled OT session with focus on Transfers (STS, SPT), Standing tolerance/balance, Strength/ROM/HEP, Tub/shower transfers, Activity tolerance, Education on safety, fall prevention and energy conservation techniques, Bathroom/kitchen/home mobility and Fine motor coordination/hand strength     Barriers to treatment include fatigue, pain, safety, home environment (management in/out or throughout home), decreased strength/coordination, balance , activity tolerance and standing tolerance  Patient educated on safe functional transfers techniques, the appropriate use of AE/DME to improve functional performance, and activity modification techniques for energy conservation  Plans for d/c are home with OT and 24* supervision  Patient is making gains toward OT goals with continued OT recommended at this time to max tolerance, safety and function for appropriate d/c planning  At end of session patient remains in room seated at recliner with all needs within reach  Patient reports that the lights are bothering him more therefore recommended that family bring in sunglasses  Patient reports that he does use at home PRN but usually keeps eyes closed  Will ask daughter        Goals STG achieved within 2 weeks Performance at Initial Evaluation 1/3/2020  Current Performance (last date completed)   Grooming while standing at sink S S seated 1/4 S seated    ADL transfers  MI S 1/6 STS min to CGA/CS; SPT CS + rollator  1/4 S standard height, Orly from lower surface (couch)   Bathroom mobility - reports bathroom not accessible with rollator  S CGA 1/6 CS + rollator   1/4 CGA with Rollator   UB ADL  MI Min A 1/4 Min A   LB ADL, AE PRN S Mod A 1/4 Mod A   Toileting/clothing management and hygiene S Min A 1/3 Min A- A with pants from floor   Dynamic standing balance for increased safety when completing purposeful tasks F+ P+ 1/6 Fair/Fair- static and light dynamic challenge  1/4 P+   Increase standing tolerance for inc'd safety with standing purposeful tasks 4-7 min 2 min 1/6 2 min average-- easily fatigued  1/4 2 min    Pt will demonstrate appropriate visual compensatory strategies with ADL tasks/therapuetic activities  Ind / 0 VC's    1/6 reports use of sunglasses post recommendations of use at home     Participate in therex 1-3x/week for inc'd overall stamina/activity tolerance for purposeful tasks To be completed    1/6 2# hand weight completing 2x5 chest press, x10 side/side, x10 circles, x10 biceps  1/3 L shoulder 3-/5    Participate in further cognitive testing to assist with safe d/c planning To be completed    1/6  A&Ox4  Pleasant and cooperative  Able to state how he manages tub at home which appears to be accurate  1/3 A&O x4 -- increased time required for processing    Tub/shower (+) GBs and shower chair  S Unable to assess 1/6 Overall min assist provided for overall safety as patient very fatigued this date along with reports of eyes hurting  Daughter always assists  Patient states set up as follows-- sits on toilet and gets left leg into tub, stands with daughter assist PRN and reaches for grab bars to lift right leg over 16" tub wall and sits on shower chair   Patient then reverses to get out of tub       Kitchen mobility/light meal prep for inc'd independence and safety negotiating kitchen environment  S Unable to assess  1/6 CS + Rollator simulated environment   Bed mobility with HOB flat  MI Min A 1/4 Min A sit<>sup, LE management       Chip Fiore  1/6/2020

## 2020-01-06 NOTE — NURSING NOTE
Pt complained of pruritis to face, eyes, neck and arms  Denied any difficulty swallowing  Faint rash noted to his neck, puffiness to both eyebrows, no rash noted to arms  PA covering for SLIM notified, OT dose of Benadryl ordered and given  Pt also given cold wash cloth for eyes  Some relief obtained  Pt had received no meds since 1900  Apresoline held x2 for SBP below ordered parameters  BA in place, pt is forgetful with ringing before getting oob

## 2020-01-06 NOTE — PLAN OF CARE
Problem: OCCUPATIONAL THERAPY ADULT  Goal: Performs self-care activities at highest level of function for planned discharge setting  See evaluation for individualized goals  Description  Treatment Interventions: ADL retraining, Visual perceptual retraining, Functional transfer training, UE strengthening/ROM, Endurance training, Cognitive reorientation, Patient/family training, Equipment evaluation/education, Neuromuscular reeducation, Fine motor coordination activities, Compensatory technique education, Continued evaluation, Energy conservation, Activityengagement          See flowsheet documentation for full assessment, interventions and recommendations      Outcome: Progressing  Note:   Limitation: Decreased ADL status, Decreased UE ROM, Decreased UE strength, Decreased Safe judgement during ADL, Decreased cognition, Decreased endurance, Decreased sensation, Visual deficit, Decreased fine motor control, Decreased self-care trans, Decreased high-level ADLs  Prognosis: Good, Fair  Assessment: see note  OT Discharge Recommendation: Home OT(with additional family support )  OT - OK to Discharge: No

## 2020-01-06 NOTE — PLAN OF CARE
Problem: PHYSICAL THERAPY ADULT  Goal: Performs mobility at highest level of function for planned discharge setting  See evaluation for individualized goals  Description  Treatment/Interventions: ADL retraining, Functional transfer training, LE strengthening/ROM, Elevations, Therapeutic exercise, Endurance training, Cognitive reorientation, Patient/family training, Equipment eval/education, Bed mobility, Gait training, Compensatory technique education, Continued evaluation, Spoke to MD, Spoke to nursing, Spoke to case management, Spoke to advanced practitioner, OT, Family  Equipment Recommended: (TBD)       See flowsheet documentation for full assessment, interventions and recommendations  Outcome: Progressing  Note:   Prognosis: Good  Problem List: Decreased strength, Decreased range of motion, Decreased endurance, Impaired balance, Impaired judgement, Decreased safety awareness, Decreased skin integrity, Pain, Orthopedic restrictions, Impaired hearing  Assessment: Pt seen today for a PT treatment session, found seated in recliner alert and agreeable to therapy  He is reporting pain in his foot, leg and shoulder  He continues to be limited by fatigue, able to tolerate 75ft ambulation trial today  He was able to demonstrate his normal technique for curb step negotiation with min A, he utilizes door frame and rollator for this  At end of session he was left in recliner with all needs in reach and all questions answered  Barriers to Discharge: Inaccessible home environment, Decreased caregiver support     Recommendation: Short-term skilled PT     PT - OK to Discharge: Yes    See flowsheet documentation for full assessment

## 2020-01-06 NOTE — PROGRESS NOTES
NEPHROLOGY PROGRESS NOTE   Keila Montana [de-identified] y o  male MRN: [id number]  Unit/Bed#: -02 Encounter: [id number]      ASSESSMENT & PLAN:  1  Acute kidney injury (POA) creatinine peak at 4 2 on currently improved back to near baseline  Renal function is stable on Lasix 40 mg daily  Follow serial labs  Monitor ins and outs  Avoid hypotension  2  Chronic kidney disease stage 4 suspected secondary to hypertensive nephrosclerosis as well as component of cardiorenal syndrome and age-related nephron loss  Baseline creatinine reported around 2 8-3 2  Follow up with Dr Fiona Talbot as an outpatient  Previously followed with a nephrologist in [place]  3  Chronic systolic congestive heart failure with ischemic cardiomyopathy status post ICD placement  Continue with current diuretics with Lasix 40 mg daily  Volume status seems acceptable  Continue with current fluid restriction 1 5 L daily  Monitor ins and outs and follow daily weight  4  Anemia chronic kidney disease, hemoglobin at goal at 10 3     5  Chronic pruritus, further management per primary team     6  Chronic abdominal pain  My plan and recommendations were discussed with Dr Yeny Mccray from Internal Medicine team    SUBJECTIVE:  Patient seen and examined, complaining of generalized itchiness for several months as well as on and off abdominal pain for several weeks  Denies any chest pain or shortness of breath, no nausea, no vomiting, denies any urinary problems  Denies diarrhea      OBJECTIVE:  Current Weight: Weight - Scale: 71 8 kg (158 lb 4 6 oz)  Vitals:    01/06/20 0615   BP: 98/58   Pulse: 55   Resp: 18   Temp: 98 6 °F (37 °C)   SpO2: 91%       Intake/Output Summary (Last 24 hours) at 1/6/2020 1002  Last data filed at 1/6/2020 0810  Gross per 24 hour   Intake 1760 ml   Output 900 ml   Net 860 ml     General: conscious, cooperative, in not acute distress  Eyes: conjunctivae pale, anicteric sclerae  ENT: lips and mucous membranes moist  Neck: supple, no JVD  Chest: clear breath sounds bilateral, no crackles, ronchus or wheezings  CVS: distinct S1 & S2, normal rate, regular rhythm  Abdomen: soft, non-tender, non-distended, normoactive bowel sounds  Back: no CVA tenderness  Extremities: no significant edema of both legs  Skin: no rash  Neuro: awake, alert, oriented        Medications:    Current Facility-Administered Medications:     acetaminophen (TYLENOL) tablet 650 mg, 650 mg, Oral, Q6H PRN, Krista A Sedora, CRNP, 650 mg at 01/03/20 1209    atorvastatin (LIPITOR) tablet 40 mg, 40 mg, Oral, After Светлана Ward MD, 40 mg at 01/05/20 1704    furosemide (LASIX) tablet 40 mg, 40 mg, Oral, Daily, Krista A Sedora, CRNP, 40 mg at 01/06/20 0903    gabapentin (NEURONTIN) capsule 300 mg, 300 mg, Oral, Daily, Krista A Sedora, CRNP, 300 mg at 01/06/20 0903    hydrALAZINE (APRESOLINE) tablet 50 mg, 50 mg, Oral, Q8H ELOISE, Krista A Sedora, CRNP    isosorbide dinitrate (ISORDIL) tablet 20 mg, 20 mg, Oral, TID after meals, Namja K Nata, DO, 20 mg at 01/06/20 0903    metoprolol succinate (TOPROL-XL) 24 hr tablet 25 mg, 25 mg, Oral, Daily, Najma K Nata, DO, 25 mg at 01/05/20 0821    pantoprazole (PROTONIX) EC tablet 40 mg, 40 mg, Oral, Daily, Krista A Sedora, CRNP, 40 mg at 01/06/20 5406    polyethylene glycol (MIRALAX) packet 17 g, 17 g, Oral, Daily PRN, Serge Seymour MD    senna-docusate sodium (SENOKOT S) 8 6-50 mg per tablet 1 tablet, 1 tablet, Oral, BID PRN, Serge Seymour MD    tamsulosin (FLOMAX) capsule 0 8 mg, 0 8 mg, Oral, Daily With Dinner, Krista RUBIN Sedora, CRNP, 0 8 mg at 01/05/20 1703    tuberculin injection 5 Units, 5 Units, Intradermal, PRN, Joni White MD, 5 Units at 01/03/20 1702    warfarin (COUMADIN) tablet 3 mg, 3 mg, Oral, Daily (warfarin), AUDREY Castañeda, 3 mg at 01/05/20 1704    Invasive Devices:        Lab Results:   Results from last 7 days   Lab Units 01/06/20  0428 01/02/20  0510 01/01/20  0553   WBC Thousand/uL 4 10* 4 31  --    HEMOGLOBIN g/dL 10 3* 9 9*  --    HEMATOCRIT % 31 0* 31 1*  --    PLATELETS Thousands/uL 191 165  --    SODIUM mmol/L 137 138 140   POTASSIUM mmol/L 4 0 3 9 4 0   CHLORIDE mmol/L 106 105 107   CO2 mmol/L 18* 23 21   BUN mg/dL 65* 60* 63*   CREATININE mg/dL 3 39* 3 21* 3 42*   CALCIUM mg/dL 9 6 9 4 9 4       Previous work up:  See previous notes      Portions of the record may have been created with voice recognition software  Occasional wrong word or "sound a like" substitutions may have occurred due to the inherent limitations of voice recognition software  Read the chart carefully and recognize, using context, where substitutions have occurred  If you have any questions, please contact the dictating provider

## 2020-01-07 ENCOUNTER — EPISODE CHANGES (OUTPATIENT)
Dept: CASE MANAGEMENT | Facility: HOSPITAL | Age: 80
End: 2020-01-07

## 2020-01-07 PROBLEM — I50.42 HYPERTENSIVE HEART DISEASE WITH CHRONIC COMBINED SYSTOLIC AND DIASTOLIC CONGESTIVE HEART FAILURE (HCC): Status: ACTIVE | Noted: 2020-01-07

## 2020-01-07 PROBLEM — I11.0 HYPERTENSIVE HEART DISEASE WITH CHRONIC COMBINED SYSTOLIC AND DIASTOLIC CONGESTIVE HEART FAILURE (HCC): Status: ACTIVE | Noted: 2020-01-07

## 2020-01-07 PROCEDURE — 99232 SBSQ HOSP IP/OBS MODERATE 35: CPT | Performed by: INTERNAL MEDICINE

## 2020-01-07 PROCEDURE — 97116 GAIT TRAINING THERAPY: CPT

## 2020-01-07 PROCEDURE — 99309 SBSQ NF CARE MODERATE MDM 30: CPT | Performed by: FAMILY MEDICINE

## 2020-01-07 PROCEDURE — 97110 THERAPEUTIC EXERCISES: CPT

## 2020-01-07 PROCEDURE — 97530 THERAPEUTIC ACTIVITIES: CPT

## 2020-01-07 RX ORDER — HYDRALAZINE HYDROCHLORIDE 25 MG/1
25 TABLET, FILM COATED ORAL EVERY 8 HOURS SCHEDULED
Status: DISCONTINUED | OUTPATIENT
Start: 2020-01-07 | End: 2020-01-07

## 2020-01-07 RX ORDER — HYDRALAZINE HYDROCHLORIDE 10 MG/1
10 TABLET, FILM COATED ORAL EVERY 8 HOURS SCHEDULED
Status: DISCONTINUED | OUTPATIENT
Start: 2020-01-07 | End: 2020-01-08

## 2020-01-07 RX ORDER — SODIUM BICARBONATE 650 MG/1
650 TABLET ORAL
Status: DISCONTINUED | OUTPATIENT
Start: 2020-01-07 | End: 2020-01-13

## 2020-01-07 RX ADMIN — DIPHENHYDRAMINE HCL 25 MG: 25 TABLET ORAL at 20:01

## 2020-01-07 RX ADMIN — GABAPENTIN 300 MG: 300 CAPSULE ORAL at 09:05

## 2020-01-07 RX ADMIN — ATORVASTATIN CALCIUM 40 MG: 80 TABLET, FILM COATED ORAL at 17:54

## 2020-01-07 RX ADMIN — WARFARIN SODIUM 3 MG: 3 TABLET ORAL at 17:54

## 2020-01-07 RX ADMIN — TAMSULOSIN HYDROCHLORIDE 0.8 MG: 0.4 CAPSULE ORAL at 16:43

## 2020-01-07 RX ADMIN — DIPHENHYDRAMINE HCL 25 MG: 25 TABLET ORAL at 00:02

## 2020-01-07 RX ADMIN — SODIUM BICARBONATE 650 MG: 650 TABLET ORAL at 18:10

## 2020-01-07 RX ADMIN — PANTOPRAZOLE SODIUM 40 MG: 40 TABLET, DELAYED RELEASE ORAL at 06:24

## 2020-01-07 RX ADMIN — SENNOSIDES AND DOCUSATE SODIUM 1 TABLET: 8.6; 5 TABLET ORAL at 20:04

## 2020-01-07 RX ADMIN — FUROSEMIDE 40 MG: 40 TABLET ORAL at 09:05

## 2020-01-07 NOTE — PROGRESS NOTES
Progress Note Estelle Salines 1940, [de-identified] y o  male MRN: [id number]    Unit/Bed#: -02 Encounter: [id number]    Primary Care Provider: Rosina Yates PA-C   Date and time admitted to hospital: 1/2/2020  8:20 PM        * Acute renal failure superimposed on stage 4 chronic kidney disease [facility])  Assessment & Plan  Patient was recently admitted to [facility] secondary to acute kidney injury on CKD stage 4  Baseline creatinine is 2 4-3 2  Peak creatinine on prior admission was 4 2  Continue Lasix and monitor renal function closely every week  Avoid nephrotoxin agents  Avoid hypotension  Patient recently had some bouts of hypotension and hence need to be cautious  No hydronephrosis noted on imaging and normal postvoid residual volumes noted  Appreciate Nephrology input    Patient is very weak and deconditioned requires physical therapy and occupation therapy for evaluation and to work with him to help improve his ambulatory status  Endurance, stamina and performance of ADL and IADL      PAD (peripheral artery disease) ([address] )  Assessment & Plan  Patient has known peripheral arterial disease  Continue Coumadin for now  Continue adequate blood pressure control and statin therapy   right lower extremity stent previously placed  Patient has chronic neuropathic pain and continue Neurontin  Also needs physical therapy occupational therapy to help with improving his stamina and endurance and performance of ADL and IADL  Also has history of DVT status post IVC filter    Chronic systolic congestive heart failure [facility])  Assessment & Plan  Wt Readings from Last 3 Encounters:   01/07/20 72 kg (158 lb 11 7 oz)   01/02/20 73 6 kg (162 lb 4 1 oz)   09/23/19 77 1 kg (170 lb)     Is currently euvolemic  He has known history of chronic systolic CHF with EF of 13% on last 2D echo done 02/19  Patient's baseline weight is 162 lb -166 lb  Currently he is on Lasix 40 mg daily    His weight is down to 158 lb which is below his baseline weight      Hypertensive heart disease with chronic combined systolic and diastolic congestive heart failure [facility])  Assessment & Plan  Wt Readings from Last 3 Encounters:   01/07/20 72 kg (158 lb 11 7 oz)   01/02/20 73 6 kg (162 lb 4 1 oz)   09/23/19 77 1 kg (170 lb)     Patient's blood pressures have been low normal range  Secondary to recent elevation creatinine will try to be a little bit more liberal with blood pressure control  Will decrease hydralazine from 50 mg Q 8-10 mg Q 8  Continue Isordil and beta-blockers for now and observe  Try to keep systolic blood pressure more than 130        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 [facility])  Assessment & Plan  Patient has known dementia with behavioral disturbance  He is currently at his baseline  Needs fall precautions and monitor his behaviors during the evenings for signs of sundowning    Mixed hyperlipidemia  Assessment & Plan  Will check a lipid panel on Monday  Lipitor dose recently decreased    Anemia due to stage 4 chronic kidney disease ([facility] [address] )  Assessment & Plan  Patient's hemoglobin is 9 9  Patient has anemia of chronic disease and renal dysfunction   Stable at this time    BPH (benign prostatic hyperplasia)  Assessment & Plan  Stable on Flomax 0 8 mg daily  Voiding appears to be normal at this time    Paroxysmal atrial fibrillation [facility])  Assessment & Plan  EKG currently shows atrial paced rhythm  Continue metoprolol for rate control and Coumadin for anticoagulation  INR is therapeutic    GERD (gastroesophageal reflux disease)  Assessment & Plan  Stable on Protonix      VTE Pharmacologic Prophylaxis:   Pharmacologic: Warfarin (Coumadin)  Mechanical VTE Prophylaxis in Place: No    Patient Centered Rounds: I have performed bedside rounds with nursing staff today      Discussions with Specialists or Other Care Team Provider:  Discussed with Nephrology    Education and Discussions with Family / Patient: Discussed with patient at bedside    Time Spent for Care: 30 minutes  More than 50% of total time spent on counseling and coordination of care as described above  Current Length of Stay: 5 day(s)    Current Patient Status: SNF Short Term Inpatient     Discharge Plan:  Pending progress    Code Status: Level 1 - Full Code      Subjective:   Patient denies any chest pain or shortness of breath or abdominal pain at this time    Objective:     Vitals:   Temp (24hrs), Av 9 °F (36 6 °C), Min:97 4 °F (36 3 °C), Max:98 3 °F (36 8 °C)    Temp:  [97 4 °F (36 3 °C)-98 3 °F (36 8 °C)] 97 4 °F (36 3 °C)  HR:  [60] 60  Resp:  [18] 18  BP: (101-112)/(54-60) 108/54  SpO2:  [92 %-96 %] 96 %  Body mass index is 25 62 kg/m²  Input and Output Summary (last 24 hours): Intake/Output Summary (Last 24 hours) at 2020 1804  Last data filed at 2020 1318  Gross per 24 hour   Intake 885 ml   Output 1500 ml   Net -615 ml       Physical Exam:     Physical Exam   Constitutional: He appears well-developed and well-nourished  HENT:   Head: Normocephalic and atraumatic  Right Ear: External ear normal    Left Ear: External ear normal    Mouth/Throat: Oropharynx is clear and moist    Eyes: Conjunctivae and EOM are normal    Neck: Normal range of motion  Neck supple  Cardiovascular: Normal rate, regular rhythm and normal heart sounds  Pulmonary/Chest: Effort normal and breath sounds normal    Abdominal: Soft  Bowel sounds are normal  He exhibits no mass  There is no tenderness  There is no rebound and no guarding  Genitourinary:   Genitourinary Comments: deferred   Musculoskeletal: Normal range of motion  Neurological: He is alert  He has normal reflexes  Cranial nerves 2-12 are normal   Normal neurological exam   Skin: Skin is warm and dry  No rash noted  Psychiatric: He has a normal mood and affect  Nursing note and vitals reviewed          Additional Data:     Labs:    Results from last 7 days   Lab Units 01/06/20  0428 01/02/20  0510   WBC Thousand/uL 4 10* 4 31   HEMOGLOBIN g/dL 10 3* 9 9*   HEMATOCRIT % 31 0* 31 1*   PLATELETS Thousands/uL 191 165   NEUTROS PCT %  --  48   LYMPHS PCT %  --  32   MONOS PCT %  --  7   EOS PCT %  --  11*     Results from last 7 days   Lab Units 01/06/20  0428   SODIUM mmol/L 137   POTASSIUM mmol/L 4 0   CHLORIDE mmol/L 106   CO2 mmol/L 18*   BUN mg/dL 65*   CREATININE mg/dL 3 39*   ANION GAP mmol/L 13   CALCIUM mg/dL 9 6   GLUCOSE RANDOM mg/dL 93     Results from last 7 days   Lab Units 01/06/20  0428   INR  2 60*                       * I Have Reviewed All Lab Data Listed Above  * Additional Pertinent Lab Tests Reviewed:  Adam 65 Admission Reviewed    Imaging:    Imaging Reports Reviewed Today Include:  None  Imaging Personally Reviewed by Myself Includes:  None    Recent Cultures (last 7 days):           Last 24 Hours Medication List:     Current Facility-Administered Medications:  acetaminophen 650 mg Oral Q6H PRN AUDREY Castañeda   atorvastatin 40 mg Oral After Dinner Mason Rivera MD   diphenhydrAMINE 25 mg Oral Q6H PRN Haja Santiago MD   furosemide 40 mg Oral Daily AUDREY Castañeda   gabapentin 300 mg Oral Daily AUDREY Castañeda   hydrALAZINE 10 mg Oral Q8H [address] Mason Rivera MD   hydrocortisone  Topical 4x Daily PRN Haja Santiago MD   isosorbide dinitrate 20 mg Oral TID after meals Najma CARVALHO Nata, DO   metoprolol succinate 25 mg Oral Daily Najma MAIA Nata, DO   pantoprazole 40 mg Oral Daily AUDREY Castañdea   polyethylene glycol 17 g Oral Daily PRN Mason Rivera MD   senna-docusate sodium 1 tablet Oral BID PRN Mason Rivera MD   sodium bicarbonate 650 mg Oral BID after meals Mason Rivera MD   tamsulosin 0 8 mg Oral Daily With Dinner Stefanie Arthurine Kehr, CRNP   tuberculin 5 Units Intradermal PRN Di Morin MD   warfarin 3 mg Oral Daily (warfarin) AUDREY Mansfield        Today, Patient Was Seen By: Mason Rivera MD    ** Please Note: Dictation voice to text software may have been used in the creation of this document   **

## 2020-01-07 NOTE — PHYSICAL THERAPY NOTE
60 minute treatment       01/07/20 0857   Pain Assessment   Pain Assessment 0-10   Pain Score 8   Pain Type Chronic pain   Pain Location Shoulder;Generalized;Foot; Toe (Comment which one)   Restrictions/Precautions   Weight Bearing Precautions Per Order No   Other Precautions Fall Risk;Hard of hearing;Visual impairment   General   Chart Reviewed Yes   Response to Previous Treatment Patient with no complaints from previous session  Family/Caregiver Present No   Cognition   Overall Cognitive Status Impaired   Following Commands Follows one step commands without difficulty   Subjective   Subjective What do you want me to do? Transfers   Sit to Stand 5  Supervision   Additional items Increased time required;Armrests   Stand to Sit 5  Supervision   Additional items Increased time required;Armrests   Stand pivot 5  Supervision   Additional items Increased time required  (Rollator walker)   Ambulation/Elevation   Gait pattern Decreased foot clearance; Antalgic; Steppage   Gait Assistance 5  Supervision   Assistive Device 4-wheeled walker  (rollator walker)   Stair Management Assistance 4  Minimal assist   Additional items Assist x 1   Stair Management Technique Step to pattern; Foreward;Assistive device  (rollator walker, and doorframe)   Number of Stairs   (1 curb step)   Curbs   (see stair section)   Balance   Ambulatory Fair -   Endurance Deficit   Endurance Deficit Yes   Endurance Deficit Description fatigues w/ amb   Activity Tolerance   Activity Tolerance Patient limited by fatigue   Exercises   Hamstring Sets   (ham curls w/ tband x 20)   Hip Flexion Sitting;20 reps;AROM; Bilateral   Hip Abduction   (Hip abd w/ tband x 20)   Ankle Pumps Sitting;Left;AROM;20 reps;PROM; Right   Assessment   Prognosis Good   Problem List Decreased strength;Decreased range of motion;Decreased endurance; Impaired balance; Impaired judgement;Decreased safety awareness;Decreased skin integrity;Pain;Orthopedic restrictions; Impaired hearing Assessment Pt seen for PT treatment session  Pts gait is slow, but steady  No LOB  Pt fatigues quickly w/ amb, and requires frequent rest breaks w/ amb       Goals   Patient Goals none stated   STG Expiration Date 01/17/20   PT Treatment Day 4   Plan   Treatment/Interventions   (Continue per plan of care)   Progress Progressing toward goals   PT Frequency   (5-7x/week)   Anastasia Soliz PTA   Goals STG achieved within  2 weeks Performance at Initial Evaluation (01/03/20) Current Performance (last date completed)   Supine to sit transitions to increase ease of transfer, allow pt to get out of bed, and decrease caregiver burden    MOD I Supervision   - HOB flat  - No rail 01/04/20   Sit to supine transitions to increase ease of transfer, allow pt to get back into bed, and decrease caregiver burden MOD I Supervision   - HOB flat  - No rail 01/04/20   Functional transfers to facilitate safe return to previous living environment     MOD I Sit <->stand tranfers: CG A     SPT with Rollator: CG A 01/07/20   Ambulation with rollator for 50 feet   ( for safe household distance ambulation)     MOD I Ambulate with rollator 17 feet with CG A 01/07/20   Ambulation with rollator for > or = 100 feet ( to initiate short community distance ambulation, Supervised by daughter)     Supervision  Unable to ambulate > 17 feet with rollator ; due to very low blood pressure 01/07/20       Ascend/descend a curb step, using appropriate AD and method, no handrails, for safe access to previous living environment CG A Deferred today -> due to very low BP 1/7/20   Anastasia Soliz PTA

## 2020-01-07 NOTE — SOCIAL WORK
[facility] was scheduled today with patient's daughter Irena Dorantes however she did not show up  SW left Irena Dorantes a VM to discuss rescheduling

## 2020-01-07 NOTE — PROGRESS NOTES
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 as well as component of cardiorenal syndrome and age-related nephron loss  Baseline creatinine reported around 2 8-3 2  Follows  with Dr Naomi Grimm as an outpatient  Previously followed with a nephrologist in [place]  3  Chronic systolic congestive heart failure with ischemic cardiomyopathy status post ICD placement  Continue with current diuretics with Lasix 40 mg daily  Volume status acceptable  Continue with current fluid restriction 1 5 L daily  Monitor ins and outs and follow daily weight  4  Anemia chronic kidney disease, hemoglobin at goal at 10 3 yesterday  5  Chronic pruritus, further management per primary team   Last serum phosphorus 3 4 on 12/03    6  Chronic abdominal pain  My plan and recommendations were discussed with Dr Shaista Duarte from Internal Medicine team    SUBJECTIVE:  Patient seen and examined, denies any chest pain or shortness of Breath  Continue with on and off abdominal pain for several weeks as well as itchiness for several months      OBJECTIVE:  Current Weight: Weight - Scale: 72 kg (158 lb 11 7 oz)  Vitals:    01/07/20 0659   BP:    Pulse:    Resp:    Temp: 98 3 °F (36 8 °C)   SpO2:        Intake/Output Summary (Last 24 hours) at 1/7/2020 0900  Last data filed at 1/7/2020 0856  Gross per 24 hour   Intake 645 ml   Output 1150 ml   Net -505 ml     General: conscious, cooperative, in not acute distress  Eyes: conjunctivae pale, anicteric sclerae  ENT: lips and mucous membranes moist  Neck: supple, no JVD  Chest: clear breath sounds bilateral, no crackles, ronchus or wheezings  CVS: distinct S1 & S2, normal rate, regular rhythm  Abdomen: soft, non-tender, non-distended, normoactive bowel sounds  Extremities: no edema of both legs  Skin: no rash  Neuro: awake, alert, oriented        Medications:    Current Facility-Administered Medications:     acetaminophen (TYLENOL) tablet 650 mg, 650 mg, Oral, Q6H PRN, Krista Souza, CRNP, 650 mg at 01/03/20 1209    atorvastatin (LIPITOR) tablet 40 mg, 40 mg, Oral, After Alesha Ward MD, 40 mg at 01/06/20 1814    diphenhydrAMINE (BENADRYL) tablet 25 mg, 25 mg, Oral, Q6H PRN, Joan Bcek MD, 25 mg at 01/07/20 0002    furosemide (LASIX) tablet 40 mg, 40 mg, Oral, Daily, Krista Souza, CRNP, 40 mg at 01/06/20 0903    gabapentin (NEURONTIN) capsule 300 mg, 300 mg, Oral, Daily, Krista Souza, CRNP, 300 mg at 01/06/20 0903    hydrALAZINE (APRESOLINE) tablet 50 mg, 50 mg, Oral, Q8H [facility], AUDREY Castañeda    hydrocortisone 1 % cream, , Topical, 4x Daily PRN, Joan Beck MD    isosorbide dinitrate (ISORDIL) tablet 20 mg, 20 mg, Oral, TID after meals, Najma CARVALHO Nata, DO, 20 mg at 01/06/20 1813    metoprolol succinate (TOPROL-XL) 24 hr tablet 25 mg, 25 mg, Oral, Daily, Najma K Nata, DO, 25 mg at 01/05/20 0821    pantoprazole (PROTONIX) EC tablet 40 mg, 40 mg, Oral, Daily, Krista Souza, CRNP, 40 mg at 01/07/20 7379    polyethylene glycol (MIRALAX) packet 17 g, 17 g, Oral, Daily PRN, Shanita Keita MD    senna-docusate sodium (SENOKOT S) 8 6-50 mg per tablet 1 tablet, 1 tablet, Oral, BID PRN, Shanita Keita MD    tamsulosin (FLOMAX) capsule 0 8 mg, 0 8 mg, Oral, Daily With Dinner, AUDREY Castañeda, 0 8 mg at 01/06/20 1813    tuberculin injection 5 Units, 5 Units, Intradermal, PRN, Parris Walsh MD, 5 Units at 01/03/20 1702    warfarin (COUMADIN) tablet 3 mg, 3 mg, Oral, Daily (warfarin), AUDREY Castañeda, 3 mg at 01/06/20 1814    Invasive Devices:        Lab Results:   Results from last 7 days   Lab Units 01/06/20  0428 01/02/20  0510 01/01/20  0553   WBC Thousand/uL 4 10* 4 31  --    HEMOGLOBIN g/dL 10 3* 9 9*  --    HEMATOCRIT % 31 0* 31 1*  --    PLATELETS Thousands/uL 191 165  --    SODIUM mmol/L 137 138 140   POTASSIUM mmol/L 4 0 3 9 4 0   CHLORIDE mmol/L 106 105 107   CO2 mmol/L 18* 23 21   BUN mg/dL 65* 60* 63*   CREATININE mg/dL 3 39* 3 21* 3 42*   CALCIUM mg/dL 9 6 9 4 9 4       Previous work up:  See previous notes      Portions of the record may have been created with voice recognition software  Occasional wrong word or "sound a like" substitutions may have occurred due to the inherent limitations of voice recognition software  Read the chart carefully and recognize, using context, where substitutions have occurred  If you have any questions, please contact the dictating provider

## 2020-01-07 NOTE — OCCUPATIONAL THERAPY NOTE
Occupational Therapy Treatment Note     Patient Name: Maik Mancuso     Problem List  Principal Problem:    Acute renal failure superimposed on stage 4 chronic kidney disease (HCC)  Active Problems:    Chronic systolic congestive heart failure (HCC)    GERD (gastroesophageal reflux disease)    Paroxysmal atrial fibrillation (HCC)    BPH (benign prostatic hyperplasia)    PAD (peripheral artery disease) (HCC)    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)    Mixed hyperlipidemia    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 (HCC)        TIME:   · AM: alseep at scheduled treatment time  · PM: (5237-4945) 15 minutes  Patient scheduled for care conference-- daughter not present  Therapy not tolerated at this time  See assessment below  · Patient attempted again as daughter did not arrive for scheduled care meeting  Patient continues to inciate being very fatigued and refusing at this time  COGNITION: impaired, decreased short term memory, decreased recall of safety precautions, requires increased time and repeat cues for cognitive processing   PRECAUTIONS: bed alarm, chair alarm, cognitive, fall risk   PAIN: reports of "itching" but no pain at this time  GOAL EXPIRATION: 1/17/2020  TREATMENT DAY: 4  DISCHARGE RECOMMENDATION: home OT and family support      HOME SET UP INFO:  · Lives in a house with 1 SHERI (high) doorway for support  · Tub/shower unit + GB, + Shower chair  · Does NOT take rollator into bathroom  · DME:  SENG Salazar  · Lives with daughter  · Daughter provides CS for community support   · -   · Sleeps in reg bed with LE elevated on pillows     SESSION DETAILS/ASSESSMENT:   · 1st attempt: MADDOX attempted to see patient at scheduled treatment time  Patient in room seated at recliner but asleep    Session withheld till PM     · 2nd Attempt:  Patient reports that he is feeling overly tired this PM and requested to just rest   MADDOX provided education that he was scheduled for a care conference this PM however daughter was not currently present  Patient unaware of meeting and reasoning with further ed provided  MADDOX attempted to encourage patient to participate as able until daughter arrived with patient again poliety declining  Patient denies dizziness, lightheadedness or sick feeling but does state "itching" (face, throat, left UE) and fatigue  Patient noted to have a very hard time attending to MADDOX and kept eyes closed majority of time  Patient does state that he had PT this AM which was accurate  MOCA attempts would not be appropriate at this time  · 3rd attempt:  Due to excessive fatigue and reports of itching patient continues to refuse therapy at this time  Will resume next treatment day as tolerate  SW reports plan to reschedule care meeting  Goals STG achieved within 2 weeks Performance at Initial Evaluation 1/3/2020  Current Performance (last date completed)   Grooming while standing at sink S S seated 1/4 S seated    ADL transfers  MI S 1/6 STS min to CGA/CS; SPT CS + rollator  1/4 S standard height, Orly from lower surface (couch)   Bathroom mobility - reports bathroom not accessible with rollator  S CGA 1/6 CS + rollator   1/4 CGA with Rollator   UB ADL  MI Min A 1/4 Min A   LB ADL, AE PRN S Mod A 1/4 Mod A   Toileting/clothing management and hygiene S Min A 1/3 Min A- A with pants from floor   Dynamic standing balance for increased safety when completing purposeful tasks F+ P+ 1/6 Fair/Fair- static and light dynamic challenge  1/4 P+   Increase standing tolerance for inc'd safety with standing purposeful tasks 4-7 min 2 min 1/6 2 min average-- easily fatigued  1/4 2 min    Pt will demonstrate appropriate visual compensatory strategies with ADL tasks/therapuetic activities  Ind / 0 VC's    1/6 reports use of sunglasses post recommendations of use at home     Participate in therex 1-3x/week for inc'd overall stamina/activity tolerance for purposeful tasks To be completed    1/6 2# hand weight completing 2x5 chest press, x10 side/side, x10 circles, x10 biceps  1/3 L shoulder 3-/5    Participate in further cognitive testing to assist with safe d/c planning To be completed    1/6  A&Ox4  Pleasant and cooperative  Able to state how he manages tub at home which appears to be accurate  1/3 A&O x4 -- increased time required for processing    Tub/shower (+) GBs and shower chair  S Unable to assess 1/6 Overall min assist provided for overall safety as patient very fatigued this date along with reports of eyes hurting  Daughter always assists  Patient states set up as follows-- sits on toilet and gets left leg into tub, stands with daughter assist PRN and reaches for grab bars to lift right leg over 16" tub wall and sits on shower chair   Patient then reverses to get out of tub       Kitchen mobility/light meal prep for inc'd independence and safety negotiating kitchen environment  S Unable to assess  1/6 CS + Rollator simulated environment   Bed mobility with HOB flat  MI Min A 1/4 Min A sit<>sup, LE management       Segundo Silva  1/7/2020

## 2020-01-07 NOTE — PLAN OF CARE
Problem: PHYSICAL THERAPY ADULT  Goal: Performs mobility at highest level of function for planned discharge setting  See evaluation for individualized goals  Description  Treatment/Interventions: ADL retraining, Functional transfer training, LE strengthening/ROM, Elevations, Therapeutic exercise, Endurance training, Cognitive reorientation, Patient/family training, Equipment eval/education, Bed mobility, Gait training, Compensatory technique education, Continued evaluation, Spoke to MD, Spoke to nursing, Spoke to case management, Spoke to advanced practitioner, OT, Family  Equipment Recommended: (TBD)       See flowsheet documentation for full assessment, interventions and recommendations  Outcome: Progressing  Note:   Prognosis: Good  Problem List: Decreased strength, Decreased range of motion, Decreased endurance, Impaired balance, Impaired judgement, Decreased safety awareness, Decreased skin integrity, Pain, Orthopedic restrictions, Impaired hearing  Assessment: Pt seen for PT treatment session  Pts gait is slow, but steady  No LOB  Pt fatigues quickly w/ amb, and requires frequent rest breaks w/ amb  Barriers to Discharge: Inaccessible home environment, Decreased caregiver support     Recommendation: Short-term skilled PT     PT - OK to Discharge: Yes    See flowsheet documentation for full assessment

## 2020-01-08 PROCEDURE — 97116 GAIT TRAINING THERAPY: CPT

## 2020-01-08 PROCEDURE — 97530 THERAPEUTIC ACTIVITIES: CPT

## 2020-01-08 PROCEDURE — 97110 THERAPEUTIC EXERCISES: CPT

## 2020-01-08 PROCEDURE — 97535 SELF CARE MNGMENT TRAINING: CPT

## 2020-01-08 RX ORDER — ISOSORBIDE DINITRATE 10 MG/1
20 TABLET ORAL
Status: DISCONTINUED | OUTPATIENT
Start: 2020-01-08 | End: 2020-01-17 | Stop reason: HOSPADM

## 2020-01-08 RX ORDER — POLYVINYL ALCOHOL 14 MG/ML
1 SOLUTION/ DROPS OPHTHALMIC
Status: DISCONTINUED | OUTPATIENT
Start: 2020-01-08 | End: 2020-01-17 | Stop reason: HOSPADM

## 2020-01-08 RX ADMIN — GABAPENTIN 300 MG: 300 CAPSULE ORAL at 08:40

## 2020-01-08 RX ADMIN — SODIUM BICARBONATE 650 MG: 650 TABLET ORAL at 08:40

## 2020-01-08 RX ADMIN — ISOSORBIDE DINITRATE 20 MG: 10 TABLET ORAL at 12:43

## 2020-01-08 RX ADMIN — TAMSULOSIN HYDROCHLORIDE 0.8 MG: 0.4 CAPSULE ORAL at 17:01

## 2020-01-08 RX ADMIN — SODIUM BICARBONATE 650 MG: 650 TABLET ORAL at 17:01

## 2020-01-08 RX ADMIN — METOPROLOL SUCCINATE 25 MG: 25 TABLET, EXTENDED RELEASE ORAL at 08:40

## 2020-01-08 RX ADMIN — FUROSEMIDE 40 MG: 40 TABLET ORAL at 08:40

## 2020-01-08 RX ADMIN — DIPHENHYDRAMINE HCL 25 MG: 25 TABLET ORAL at 08:40

## 2020-01-08 RX ADMIN — PANTOPRAZOLE SODIUM 40 MG: 40 TABLET, DELAYED RELEASE ORAL at 06:28

## 2020-01-08 RX ADMIN — ATORVASTATIN CALCIUM 40 MG: 80 TABLET, FILM COATED ORAL at 17:01

## 2020-01-08 RX ADMIN — ISOSORBIDE DINITRATE 20 MG: 10 TABLET ORAL at 08:40

## 2020-01-08 RX ADMIN — WARFARIN SODIUM 3 MG: 3 TABLET ORAL at 17:02

## 2020-01-08 NOTE — PLAN OF CARE
Problem: PHYSICAL THERAPY ADULT  Goal: Performs mobility at highest level of function for planned discharge setting  See evaluation for individualized goals  Description  Treatment/Interventions: ADL retraining, Functional transfer training, LE strengthening/ROM, Elevations, Therapeutic exercise, Endurance training, Cognitive reorientation, Patient/family training, Equipment eval/education, Bed mobility, Gait training, Compensatory technique education, Continued evaluation, Spoke to MD, Spoke to nursing, Spoke to case management, Spoke to advanced practitioner, OT, Family  Equipment Recommended: (TBD)       See flowsheet documentation for full assessment, interventions and recommendations  Outcome: Progressing  Note:   Prognosis: Good  Problem List: Decreased strength, Decreased range of motion, Decreased endurance, Impaired balance, Decreased mobility, Decreased cognition, Decreased skin integrity, Pain, Orthopedic restrictions, Impaired sensation  Assessment: Pt seen for PT treatment session today, found seated in bedside recliner alert and agreeable to therapy  He continues to be limited in ambuation distance by fatigue  He is able to ambulate further at end of session vs start  Pt used bathroom with PT today, able to transfer supervision without use of UE, able to stand and tie pants, did need assist for pants up to knees  He is tolerating increased LE exercise today  At end of session he was left seated in bedside recliner with all needs in reach and all questions answered  Barriers to Discharge: Inaccessible home environment, Decreased caregiver support     Recommendation: Short-term skilled PT     PT - OK to Discharge: Yes    See flowsheet documentation for full assessment

## 2020-01-08 NOTE — PHYSICAL THERAPY NOTE
Physical Therapy Daily Treatment Note    Patient's Name: Nick Katz    Admitting Diagnosis  Renal failure [N19]    Problem List  Patient Active Problem List   Diagnosis    Periorbital edema    Chronic systolic congestive heart failure (HCC)    Renal disorder    GERD (gastroesophageal reflux disease)    Paroxysmal atrial fibrillation (HCC)    HTN (hypertension)    Acute renal failure superimposed on stage 4 chronic kidney disease (HCC)    CKD [facility]chronic [facility] disease), [facility] IV ([facility]    [facility] )    Hypercalcemia    BPH (benign prostatic hyperplasia)    [facility]) [facility]    Hyperkalemia    Age-related cognitive decline    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)    Chronic kidney disease-mineral and bone disorder    Dementia with psychosis ([facility] )    Chronic atrial fibrillation    Macular atrophy, retinal    Chest pain    Generalized abdominal pain    MGUS (monoclonal gammopathy of unknown significance)    Pulmonary nodule    Ambulatory dysfunction    Coronary artery disease    Mixed hyperlipidemia    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 ([facility] )    Hypertensive heart disease with chronic combined systolic and diastolic congestive heart failure ([facility])       Past Medical History  Past Medical History:   Diagnosis Date    [facility]fib ([facility] )     CHF (congestive heart failure) (HCC)     CKD (chronic kidney disease) stage 4, GFR 15-29 ml/min ([facility])     GERD (gastroesophageal reflux disease)     Glaucoma     History of transfusion     Hypertension     Renal disorder        Past Surgical History  Past Surgical History:   Procedure Laterality Date    ABDOMINAL SURGERY      CARDIAC PACEMAKER PLACEMENT      EYE SURGERY      HERNIA REPAIR      Doesn't recall, pt states he may have      VASCULAR SURGERY         Vitals:    01/08/20 0536 01/08/20 0543 01/08/20 0653 01/08/20 1407   BP:  127/71 128/86 92/52   BP Location:   Right arm    Pulse:   64    Resp:   18    Temp:   98 5 °F (36 9 °C)    TempSrc:   Temporal    SpO2:   96%    Weight: 72 1 kg (158 lb 15 2 oz)      Height:         Total PT Treatment Time: 55 minutes       01/08/20 0935   Pain Assessment   Pain Assessment 0-10   Pain Score 7   Pain Type Chronic pain   Pain Location Foot   Pain Orientation Right   Restrictions/Precautions   Weight Bearing Precautions Per Order No   Other Precautions Fall Risk;Hard of hearing;Visual impairment   General   Chart Reviewed Yes   Response to Previous Treatment Patient with no complaints from previous session  Family/Caregiver Present No   Cognition   Overall Cognitive Status Impaired   Arousal/Participation Alert; Cooperative   Attention Within functional limits   Following Commands Follows one step commands without difficulty   Subjective   Subjective I have to use the rest room   Transfers   Sit to Stand 5  Supervision   Additional items Increased time required;Armrests   Stand to Sit 5  Supervision   Additional items Increased time required;Armrests   Toilet transfer 5  Supervision   Additional items Increased time required;Standard toilet;Verbal cues   Additional Comments Rollator used for all transfers   Ambulation/Elevation   Gait pattern Excessively slow; Short stride; Steppage; Improper Weight shift;Decreased foot clearance; Antalgic  (steppage on R)   Gait Assistance 5  Supervision   Additional items Verbal cues   Assistive Device 4-wheeled walker  (rollator)   Distance 50'x3, 75'x2   Balance   Static Sitting Fair +   Dynamic Sitting Fair   Static Standing Fair   Dynamic Standing Fair -   Ambulatory Fair -   Endurance Deficit   Endurance Deficit Yes   Endurance Deficit Description fatigues with ambulation    Activity Tolerance   Activity Tolerance Patient limited by fatigue   Exercises   Heelslides Sitting;20 reps;AROM; Bilateral  (yellow tb)   Hip Flexion Sitting;20 reps;AROM; Bilateral   Hip Abduction Sitting;15 reps;AROM; Bilateral  (red tb)   Hip Adduction Sitting;15 reps  (ball squeeze; 2 sets)   Knee AROM Long Arc Quad Sitting;15 reps;AROM; Bilateral  (1 5lbs 2 sets)   Marching Sitting;Bilateral;AROM  (1 5lbs x30)   Assessment   Prognosis Good   Problem List Decreased strength;Decreased range of motion;Decreased endurance; Impaired balance;Decreased mobility; Decreased cognition;Decreased skin integrity;Pain;Orthopedic restrictions; Impaired sensation   Assessment Pt seen for PT treatment session today, found seated in bedside recliner alert and agreeable to therapy  He continues to be limited in ambuation distance by fatigue  He is able to ambulate further at end of session vs start  Pt used bathroom with PT today, able to transfer supervision without use of UE, able to stand and tie pants, did need assist for pants up to knees  He is tolerating increased LE exercise today  At end of session he was left seated in bedside recliner with all needs in reach and all questions answered  Barriers to Discharge Inaccessible home environment;Decreased caregiver support   Goals   Patient Goals use the restroom   STG Expiration Date 01/17/19   Short Term Goal #1 grid   PT Treatment Day 5   Plan   Treatment/Interventions Functional transfer training;LE strengthening/ROM; Elevations; Therapeutic exercise; Endurance training;Patient/family training;Equipment eval/education; Bed mobility;Gait training;Spoke to nursing   Progress Progressing toward goals   PT Frequency   (3-5x/wk)     Goals STG achieved within  2 weeks Performance at Initial Evaluation (01/03/20) Current Performance (last date completed)   Supine to sit transitions to increase ease of transfer, allow pt to get out of bed, and decrease caregiver burden    MOD I Supervision   - HOB flat  - No rail 01/04/20   Sit to supine transitions to increase ease of transfer, allow pt to get back into bed, and decrease caregiver burden MOD I Supervision   - HOB flat  - No rail 01/04/20   Functional transfers to facilitate safe return to previous living environment     MOD I Sit <->stand tranfers: CG A     SPT with Rollator: CG A 01/07/20   Ambulation with rollator for 50 feet   ( for safe household distance ambulation)     MOD I Ambulate with rollator 17 feet with CG A 01/07/20   Ambulation with rollator for > or = 100 feet ( to initiate short community distance ambulation, Supervised by daughter)     Supervision  Unable to ambulate > 17 feet with rollator ; due to very low blood pressure 01/07/20       Ascend/descend a curb step, using appropriate AD and method, no handrails, for safe access to previous living environment CG A Deferred today -> due to very low BP 1/7/20     Pancho Jean Baptiste, PT

## 2020-01-08 NOTE — OCCUPATIONAL THERAPY NOTE
Occupational Therapy Treatment Note     Patient Name: Maik Mancuso     Problem List  Principal Problem:    Acute renal failure superimposed on stage 4 chronic kidney disease (HCC)  Active Problems:    Chronic systolic congestive heart failure (HCC)    GERD (gastroesophageal reflux disease)    Paroxysmal atrial fibrillation (HCC)    BPH (benign prostatic hyperplasia)    PAD (peripheral artery disease) (HCC)    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)    Mixed hyperlipidemia    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 ([facility] )        CXTD: [id number]) 58 minutes  COGNITION: impaired, decreased short term memory, decreased recall of safety precautions, requires increased time and repeat cues for cognitive processing   PRECAUTIONS: bed alarm, chair alarm, cognitive, fall risk   PAIN: patient with reports of numbness to bilateral finger tips, pain to left UE and right LE rating 8/10  Patient also reporting significant discomfort/itching to bilateral eys with increased swelling noted to left eye along with itching to face/left side of neck  GOAL EXPIRATION: 1/17/2020  TREATMENT DAY: 5  DISCHARGE RECOMMENDATION: home OT and family support      HOME SET UP INFO:  · Lives in a house with 1 SHERI (high) doorway for support  · Tub/shower unit + GB, + Shower chair  · Does NOT take rollator into bathroom  · DME: [facility]  · Lives with daughter  · Daughter provides CS for community support   · -   · Sleeps in reg bed with LE elevated on pillows     SESSION DETAILS/ASSESSMENT:   Patient seen this date for OT with focus on goals as set by OTR    Patient agreeable to skilled OT session with focus on ADL's (bathing, dressing, toileting), Transfers (STS, SPT), Standing tolerance/balance, Strength/ROM/HEP, Home management, Activity tolerance, Education on safety, fall prevention and energy conservation techniques, Item retrieval/safe transport with DME ed, Bathroom/kitchen/home mobility and Fine motor coordination/hand strength  Barriers to treatment include fatigue, pain, safety, home environment (management in/out or throughout home), social/family support, decreased strength/coordination, balance , activity tolerance and standing tolerance  Patient educated on safe functional transfers techniques, the appropriate use of AE/DME to improve functional performance, and activity modification techniques for energy conservation  Plans for d/c are home with 24* supervision  Patient is making gains toward OT goals with continued OT recommended at this time to max tolerance, safety and function for appropriate d/c planning  At end of session patient remains in room seated at recliner with all needs within reach-- moist cool cloth provided for eyes to decrease some itching and provide temp  Relief  Patient seen this PM for session as tolerated  Increased rest needed for mobility to<>from treatment gym with rollator  Patient reports having a MAFO at home but that brace is not in facility (shoes are)-- PT made aware  [employer] asked if patient could have daughter bring in brace for use on unit however patient reports that he is not able to reach her stating "I don't know she has her own issues"  For UE strengthen patient with decreased ROM to left shoulder + pain-- shoulder ROM 65*  Patient tolerated exercise to right UE and bilateral biceps only  Patient gets easily fatigued needing increased rest breaks  Kitchen mobility and light meal prep addressed  Patient states daughter completes all his meals that since all "my medical issues, I just can't"  Patient asked to get self a beverage and a snack reporting that he has a mini fridge in his room that he can grab a soda, water or juice  Patient walked to sink and pretended to pour juice all with supervision  Patient then for transport states he sits on rollator and pushes self back to his chair as he is concerned of spilling his beverage  Safety concerns discussed with patient educated on alternative technique to have a spill proof or cup with lid  Patient states that he can look into getting one reporting that he would then be agreeable to placing items on the seat of the rollator for safe transport  Need to clarify with daughter assist and needs for home  Patient and SW unable to reach daughter at this time therefore care plan meeting that was scheduled 1/7/20 has not yet been rescheduled           Goals STG achieved within 2 weeks Performance at Initial Evaluation 1/3/2020  Current Performance (last date completed)   Grooming while standing at sink S S seated 1/8 supervision (easily fatigued)  1/4 S seated    ADL transfers  MI S 1/8   STS MI, SPT Supervision (cues for safety as patient will twist self quickly with sit on rollator when fatigued vs taking appropriate steps  1/6 STS min to CGA/CS; SPT CS + rollator  1/4 S standard height, Orly from lower surface (couch)   Bathroom mobility - reports bathroom not accessible with rollator  S CGA 1/8  CS + rollator   1/4 CGA with Rollator   UB ADL  MI Min A 1/4 Min A   LB ADL, AE PRN S Mod A 1/4 Mod A   Toileting/clothing management and hygiene S Min A 1/3 Min A- A with pants from floor   Dynamic standing balance for increased safety when completing purposeful tasks F+ P+ 1/8 Fair/Fair- static and light dynamic challenge  1/4 P+   Increase standing tolerance for inc'd safety with standing purposeful tasks 4-7 min 2 min 1/8 3 min average before needing to rest 2* fatigue and right LE leg pain  1/6 2 min average-- easily fatigued  1/4 2 min    Pt will demonstrate appropriate visual compensatory strategies with ADL tasks/therapuetic activities  Ind / 0 VC's    1/8 at this time patient with increased swelling to eyes limiting tolerance to activity = itching  Patient provided with cool washcloth for comfort  1/6 reports use of sunglasses post recommendations of use at home     Participate in therex 1-3x/week for inc'd overall stamina/activity tolerance for purposeful tasks To be completed    1/8 right UE 2# hand weight shoulder flex, chest press, biceps 2x10, PROM left shoulder as tolerated with ROM 65*    Seated ball stretch x10 for improved LE management  1/6 2# hand weight completing 2x5 chest press, x10 side/side, x10 circles, x10 biceps  1/3 L shoulder 3-/5    Participate in further cognitive testing to assist with safe d/c planning To be completed    1/6  A&Ox4   Pleasant and cooperative   Able to state how he manages tub at home which appears to be accurate     1/3 A&O x4 -- increased time required for processing    Tub/shower (+) GBs and shower chair  S Unable to assess 1/6 Overall min assist provided for overall safety as patient very fatigued this date along with reports of eyes hurting   Daughter always assists  Jesisca Bonniedavy states set up as follows-- sits on toilet and gets left leg into tub, stands with daughter assist PRN and reaches for grab bars to lift right leg over 16" tub wall and sits on shower chair   Patient then reverses to get out of tub      Kitchen mobility/light meal prep for inc'd independence and safety negotiating kitchen environment  S Unable to assess *1/8 Supervision beverage only -- see assessment above for details and recommendations    1/6 CS + Rollator simulated environment   Bed mobility with HOB flat  MI Min A 1/4 Min A sit<>sup, LE management       Cassia Ortiz  1/8/2020

## 2020-01-08 NOTE — SOCIAL WORK
SW has not heard back from patient's daughter  SW called and left another VM to reschedule [facility]

## 2020-01-09 PROCEDURE — 97116 GAIT TRAINING THERAPY: CPT

## 2020-01-09 PROCEDURE — 97110 THERAPEUTIC EXERCISES: CPT

## 2020-01-09 PROCEDURE — 97535 SELF CARE MNGMENT TRAINING: CPT

## 2020-01-09 PROCEDURE — 99232 SBSQ HOSP IP/OBS MODERATE 35: CPT | Performed by: INTERNAL MEDICINE

## 2020-01-09 RX ORDER — DORZOLAMIDE HYDROCHLORIDE AND TIMOLOL MALEATE 20; 5 MG/ML; MG/ML
1 SOLUTION/ DROPS OPHTHALMIC 2 TIMES DAILY
Status: DISCONTINUED | OUTPATIENT
Start: 2020-01-09 | End: 2020-01-17 | Stop reason: HOSPADM

## 2020-01-09 RX ADMIN — FUROSEMIDE 40 MG: 40 TABLET ORAL at 08:41

## 2020-01-09 RX ADMIN — SODIUM BICARBONATE 650 MG: 650 TABLET ORAL at 08:41

## 2020-01-09 RX ADMIN — ISOSORBIDE DINITRATE 20 MG: 10 TABLET ORAL at 08:41

## 2020-01-09 RX ADMIN — METOPROLOL SUCCINATE 25 MG: 25 TABLET, EXTENDED RELEASE ORAL at 08:41

## 2020-01-09 RX ADMIN — PANTOPRAZOLE SODIUM 40 MG: 40 TABLET, DELAYED RELEASE ORAL at 06:30

## 2020-01-09 RX ADMIN — SODIUM BICARBONATE 650 MG: 650 TABLET ORAL at 16:57

## 2020-01-09 RX ADMIN — WARFARIN SODIUM 3 MG: 3 TABLET ORAL at 17:02

## 2020-01-09 RX ADMIN — TAMSULOSIN HYDROCHLORIDE 0.8 MG: 0.4 CAPSULE ORAL at 16:57

## 2020-01-09 RX ADMIN — GABAPENTIN 300 MG: 300 CAPSULE ORAL at 08:41

## 2020-01-09 RX ADMIN — ATORVASTATIN CALCIUM 40 MG: 80 TABLET, FILM COATED ORAL at 17:02

## 2020-01-09 RX ADMIN — DIPHENHYDRAMINE HCL 25 MG: 25 TABLET ORAL at 16:57

## 2020-01-09 NOTE — PROGRESS NOTES
NEPHROLOGY PROGRESS NOTE   Cristofer Wiggins [de-identified] y o  male MRN: [id number]  Unit/Bed#: -02 Encounter: [id number]      ASSESSMENT & PLAN:  1  Acute kidney injury (POA) creatinine peak at 4 2, improved and is close to baseline with serum creatinine of 3 3 on 01/06   Continue with Lasix 40 mg daily  Follow labs in the morning  Monitor ins and outs  Avoid hypotension  2  Chronic kidney disease stage 4 suspected secondary to hypertensive nephrosclerosis as well as component of cardiorenal syndrome and age-related nephron loss  Baseline creatinine reported around 2 8-3 2  Follows with Dr Wiley Oliva as an outpatient  Previously followed with a nephrologist in [place]  3  Chronic systolic congestive heart failure with ischemic cardiomyopathy status post ICD placement  Continue with current diuretics with Lasix 40 mg daily  Volume status acceptable  Continue with current fluid restriction 1 5 L daily  Monitor ins and outs and follow daily weight  4  Anemia chronic kidney disease, hemoglobin at goal at 10 3 01/06  5  Chronic pruritus, further management per primary team   Last serum phosphorus 3 4 on 12/03, will check phosphorus with a m  Labs    6  Chronic abdominal pain  My plan and recommendations were discussed with Dr Lindsey Castro from Internal Medicine team    SUBJECTIVE:  Patient seen and examined, complaining of chronic itchiness 4 months, denies any significant chest pain or shortness of breath, no nausea, no vomiting, denies any urinary problems       OBJECTIVE:  Current Weight: Weight - Scale: 71 kg (156 lb 8 4 oz)  Vitals:    01/09/20 0739   BP: 139/59   Pulse: 61   Resp: 19   Temp: 98 °F (36 7 °C)   SpO2: 95%       Intake/Output Summary (Last 24 hours) at 1/9/2020 1026  Last data filed at 1/9/2020 0830  Gross per 24 hour   Intake 460 ml   Output 1200 ml   Net -740 ml     General: conscious, cooperative, in not acute distress  Eyes: conjunctivae pale, anicteric sclerae  ENT: lips and mucous membranes moist  Neck: supple, no JVD  Chest: clear breath sounds bilateral, no crackles, ronchus or wheezings  CVS: distinct S1 & S2, normal rate, regular rhythm  Abdomen: soft, non-tender, non-distended, normoactive bowel sounds  Extremities: no significant edema of both legs  Skin: no rash  Neuro: awake, alert, oriented        Medications:    Current Facility-Administered Medications:     acetaminophen (TYLENOL) tablet 650 mg, 650 mg, Oral, Q6H PRN, Krista Rigginsora, CRNP, 650 mg at 01/03/20 1209    atorvastatin (LIPITOR) tablet 40 mg, 40 mg, Oral, After Werner Ward MD, 40 mg at 01/08/20 1701    diphenhydrAMINE (BENADRYL) tablet 25 mg, 25 mg, Oral, Q6H PRN, Joshua Sam MD, 25 mg at 01/08/20 0840    furosemide (LASIX) tablet 40 mg, 40 mg, Oral, Daily, Krista Souza, CRNP, 40 mg at 01/09/20 0841    gabapentin (NEURONTIN) capsule 300 mg, 300 mg, Oral, Daily, Krista Souza, CRNP, 300 mg at 01/09/20 0841    hydrocortisone 1 % cream, , Topical, 4x Daily PRN, Joshua Sam MD    isosorbide dinitrate (ISORDIL) tablet 20 mg, 20 mg, Oral, BID after meals, Sharee Wolf MD, 20 mg at 01/09/20 0841    metoprolol succinate (TOPROL-XL) 24 hr tablet 25 mg, 25 mg, Oral, Daily, Najma Peace DO, 25 mg at 01/09/20 0841    pantoprazole (PROTONIX) EC tablet 40 mg, 40 mg, Oral, Daily, Krista Souza, CRNP, 40 mg at 01/09/20 0630    polyethylene glycol (MIRALAX) packet 17 g, 17 g, Oral, Daily PRN, Sharee Wolf MD    polyvinyl alcohol (LIQUIFILM TEARS) 1 4 % ophthalmic solution 1 drop, 1 drop, Both Eyes, Q3H PRN, Sharee Wolf MD    senna-docusate sodium (SENOKOT S) 8 6-50 mg per tablet 1 tablet, 1 tablet, Oral, BID PRN, Sharee Wolf MD, 1 tablet at 01/07/20 2004    sodium bicarbonate tablet 650 mg, 650 mg, Oral, BID after meals, Sharee Wolf MD, 650 mg at 01/09/20 0841    tamsulosin (FLOMAX) capsule 0 8 mg, 0 8 mg, Oral, Daily With Dinner, AUDREY Castañeda, 0 8 mg at 01/08/20 1701   tuberculin injection 5 Units, 5 Units, Intradermal, PRN, Edward Vicente MD, 5 Units at 01/03/20 1702    warfarin (COUMADIN) tablet 3 mg, 3 mg, Oral, Daily (warfarin), AUDREY Castañeda, 3 mg at 01/08/20 1702    Invasive Devices:        Lab Results:   Results from last 7 days   Lab Units 01/06/20  0428   WBC Thousand/uL 4 10*   HEMOGLOBIN g/dL 10 3*   HEMATOCRIT % 31 0*   PLATELETS Thousands/uL 191   SODIUM mmol/L 137   POTASSIUM mmol/L 4 0   CHLORIDE mmol/L 106   CO2 mmol/L 18*   BUN mg/dL 65*   CREATININE mg/dL 3 39*   CALCIUM mg/dL 9 6         Portions of the record may have been created with voice recognition software  Occasional wrong word or "sound a like" substitutions may have occurred due to the inherent limitations of voice recognition software  Read the chart carefully and recognize, using context, where substitutions have occurred  If you have any questions, please contact the dictating provider

## 2020-01-09 NOTE — PHYSICAL THERAPY NOTE
53 minute treatment       01/09/20 1123   Pain Assessment   Pain Assessment 0-10   Pain Score 8   Pain Type Chronic pain   Pain Location Foot   Pain Orientation Bilateral   Pain Frequency Constant/continuous   Pain Onset Ongoing   Effect of Pain on Daily Activities pInful w/ amb   Patient's Stated Pain Goal No pain   Hospital Pain Intervention(s) Ambulation/increased activity; Environmental changes; Emotional support   Restrictions/Precautions   Weight Bearing Precautions Per Order No   Other Precautions Hard of hearing; Fall Risk;Pain;Visual impairment   General   Chart Reviewed Yes   Response to Previous Treatment Patient with no complaints from previous session  Family/Caregiver Present No   Cognition   Overall Cognitive Status Impaired   Following Commands Follows one step commands without difficulty   Subjective   Subjective Pt agreeable to PT   Transfers   Sit to Stand 5  Supervision   Additional items Increased time required   Additional items Increased time required   Stand pivot 5  Supervision   Additional items Increased time required   Ambulation/Elevation   Gait pattern Decreased foot clearance; Antalgic   Gait Assistance 5  Supervision   Assistive Device 4-wheeled walker  (rollator)   Distance 100', 75' x 2, 60'    Balance   Ambulatory Fair -   Endurance Deficit   Endurance Deficit Yes   Endurance Deficit Description fatigues w/ amb   Activity Tolerance   Activity Tolerance Patient limited by fatigue   Exercises   Hamstring Sets   (Ham curls w/ tband x 20)   Hip Flexion Sitting;AROM; Bilateral  (x 30 rpes)   Hip Abduction   (Hip abd w/ tband x 20)   Hip Adduction   (Add ball squeeze x 20)   Knee AROM Long Arc Quad Sitting;Bilateral  (1 5# x 30 reps)   Assessment   Prognosis Good   Problem List Decreased strength;Decreased range of motion;Decreased endurance; Impaired balance;Decreased mobility; Decreased cognition;Decreased skin integrity;Pain;Orthopedic restrictions; Impaired sensation   Assessment Pt seen for PT treatment session  Pts gait is slightly unsteady w/ Rollator walker  Pt fatigues w/ amb, and requires seated rest breaks  Pt is able to manage brakes on rollator walker  Pt did amb an increased amb distance today        Goals   Patient Goals  none stated   STG Expiration Date 01/17/20   PT Treatment Day 6   Plan   Treatment/Interventions   (Continue per plan of care)   Progress Progressing toward goals   PT Frequency   (5-7x/week)     Goals STG achieved within  2 weeks Performance at Initial Evaluation (01/03/20) Current Performance (last date completed)   Supine to sit transitions to increase ease of transfer, allow pt to get out of bed, and decrease caregiver burden    MOD I Supervision   - HOB flat  - No rail 01/08/20   Sit to supine transitions to increase ease of transfer, allow pt to get back into bed, and decrease caregiver burden MOD I Supervision   - HOB flat  - No rail 01/8/20   Functional transfers to facilitate safe return to previous living environment     MOD I Sit <->stand tranfers: CG A     SPT with Rollator: CG A 01/09/20   Ambulation with rollator for 50 feet   ( for safe household distance ambulation)     MOD I Ambulate with rollator 17 feet with CG A 01/09/20   Ambulation with rollator for > or = 100 feet ( to initiate short community distance ambulation, Supervised by daughter)     Supervision  Unable to ambulate > 17 feet with rollator ; due to very low blood pressure 01/9/20       Ascend/descend a curb step, using appropriate AD and method, no handrails, for safe access to previous living environment CG A Deferred today -> due to very low BP 1/7/20   Sunil Soliz, PTA

## 2020-01-09 NOTE — OCCUPATIONAL THERAPY NOTE
Occupational Therapy Treatment Note     Patient Name: Maik Mancuso     Problem List  Principal Problem:    Acute renal failure superimposed on stage 4 chronic kidney disease (HCC)  Active Problems:    Chronic systolic congestive heart failure (HCC)    GERD (gastroesophageal reflux disease)    Paroxysmal atrial fibrillation (HCC)    BPH (benign prostatic hyperplasia)    PAD (peripheral artery disease) (HCC)    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)    Mixed hyperlipidemia    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 ([facility] [address] )        TIME: ([id number]) 55 minutes  COGNITION: impaired, decreased short term memory, decreased recall of safety precautions, requires increased time and repeat cues for cognitive processing   PRECAUTIONS: bed alarm, chair alarm, cognitive, fall risk     PAIN:  "6/10 right leg, feet and ankle as well as my shoulder on left" (demostrating pain after approx 60*)     GOAL EXPIRATION: 1/17/2020  TREATMENT DAY: 6  DISCHARGE RECOMMENDATION: home OT and family support      HOME SET UP INFO:  · Lives in a house with 1 SHERI (high) doorway for support  · Tub/shower unit + SENG, + Shower chair  · Does NOT take rollator into bathroom  · DME: SENG Pinzon  · Lives with daughter  · Daughter provides CS for community support   · -   · Sleeps in reg bed with LE elevated on pillows     SESSION DETAILS/ASSESSMENT:   Patient seen this date for OT with focus on goals as set by OTR  Patient agreeable to skilled OT session with focus on ADL's (bathing, dressing, toileting), Transfers (STS, SPT), Standing tolerance/balance, Strength/ROM/HEP, Home management, Activity tolerance, Education on safety, fall prevention and energy conservation techniques, Item retrieval/safe transport with DME ed, Bathroom/kitchen/home mobility and Fine motor coordination/hand strength     Barriers to treatment include fatigue, pain, safety, home environment (management in/out or throughout home), social/family support, decreased strength/coordination, balance , activity tolerance and standing tolerance  Patient educated on safe functional transfers techniques, the appropriate use of AE/DME to improve functional performance, and activity modification techniques for energy conservation  Plans for d/c are home with 24* supervision  Patient is making gains toward OT goals with continued OT recommended at this time to max tolerance, safety and function for appropriate d/c planning         Patient seen for an ADL this AM:  eating (Setup), oral hygiene (Ind ), toileting (Ind ), bathing (Setup), UB dressing (Setup), LB dressing (Setup), and don/doff footwear (Min/Mod A)--- increased time and effort overall  Patient does get easily fatigued and verbalizes difficulty with dressing however able to complete upon set up (limitations with left shoulder and right leg pain  Patient does report that daughter assists at home as needed  Patient also with c/o itching and burnng to eyes with swelling noted  Cool cloth provided at end of session for comfort        Goals STG achieved within 2 weeks Performance at Initial Evaluation 1/3/2020  Current Performance (last date completed)   Grooming while standing at sink S  (1/9) S seated 1/9 MI oral care and hand washing ([employer] provided patient with DS for overall safety as he reported fatigued with no LOB however did sit on rollator post completion of task to rest (supervision needed for transfer)  1/8 supervision (easily fatigued)  1/4 S seated    ADL transfers  MI S 1/9 STS MI (increased time/effort PRN); SPT Supervision   1/8   STS MI, SPT Supervision (cues for safety as patient will twist self quickly with sit on rollator when fatigued vs taking appropriate steps  1/6 STS min to CGA/CS; SPT CS + rollator  1/4 S standard height, Orly from lower surface (couch)   Bathroom mobility - reports bathroom not accessible with rollator  S  (1/9) CGA 1/9 Supervision + Rollator use  1/8 CS + rollator   1/4 CGA with Rollator   UB ADL  MI  (1/9) Min A 1/9 MI- set up assist provided only (increased time/effort)  1/4 Min A   LB ADL, AE PRN S  (1/9) Mod A 1/9 MI-- Set up assist along with increased time for completion (limts by pain)   1/4 Mod A   Toileting/clothing management and hygiene S  (1/9) Min A 1/9 MI-- DS safety without issue   1/3 Min A- A with pants from floor   Dynamic standing balance for increased safety when completing purposeful tasks F+  (1/9) P+ 1/9 Fair+  1/8 Fair/Fair- static and light dynamic challenge  1/4 P+   Increase standing tolerance for inc'd safety with standing purposeful tasks 4-7 min  (1/9) 2 min 1/9 sinkside and mobility with ADL 4 5 minutes (fatigued post and needed supervision to sit safely)  1/8 3 min average before needing to rest 2* fatigue and right LE leg pain  1/6 2 min average-- easily fatigued  1/4 2 min    Pt will demonstrate appropriate visual compensatory strategies with ADL tasks/therapuetic activities  Ind / 0 VC's    1/8 at this time patient with increased swelling to eyes limiting tolerance to activity = itching  Patient provided with cool washcloth for comfort  1/6 reports use of sunglasses post recommendations of use at home  Participate in therex 1-3x/week for inc'd overall stamina/activity tolerance for purposeful tasks To be completed   (1/6, 1/9)   1/9  right UE 2# hand weight shoulder flex, chest press, biceps and horizontal abduction bilaterally 2x10, PROM left shoulder as tolerated with ROM 65*     1/6 2# hand weight completing 2x5 chest press, x10 side/side, x10 circles, x10 biceps  1/3 L shoulder 3-/5    Participate in further cognitive testing to assist with safe d/c planning To be completed    1/6  A&Ox4   Pleasant and cooperative   Able to state how he manages tub at home which appears to be accurate     1/3 A&O x4 -- increased time required for processing    Tub/shower (+) GBs and shower chair  S Unable to assess 1/6 Overall min assist provided for overall safety as patient very fatigued this date along with reports of eyes hurting   Daughter always assists  Eldenis Gayatri states set up as follows-- sits on toilet and gets left leg into tub, stands with daughter assist PRN and reaches for grab bars to lift right leg over 16" tub wall and sits on shower chair   Patient then reverses to get out of tub      Kitchen mobility/light meal prep for inc'd independence and safety negotiating kitchen environment  S  (1/8) Unable to assess *1/8 Supervision beverage only -- see assessment above for details and recommendations    1/6 CS + Rollator simulated environment   Bed mobility with HOB flat  MI Min A 1/9 Min A sit<>sup, LE management       Deyanira Brown  1/9/2020

## 2020-01-09 NOTE — NURSING NOTE
Weekly skin assessment done  Pt has dry scaly skin to BLE, hydrating lotion applied  Heels are intact and elevated on a pillow  Pt has PVD discoloration to BLE  Buttocks/coccyx is intact  Folds are all intact  Pt has new skin tear to the right hand, see flow sheet for measurements  Faint rash to anterior neck

## 2020-01-09 NOTE — SOCIAL WORK
CURTIS called pt's daughter again this AM  She reports she has been very sick, will not be available for a [facility] until Monday  CURTIS scheduled for 12:00 PM  Daughter will be available by phone

## 2020-01-09 NOTE — PLAN OF CARE
Problem: PHYSICAL THERAPY ADULT  Goal: Performs mobility at highest level of function for planned discharge setting  See evaluation for individualized goals  Description  Treatment/Interventions: ADL retraining, Functional transfer training, LE strengthening/ROM, Elevations, Therapeutic exercise, Endurance training, Cognitive reorientation, Patient/family training, Equipment eval/education, Bed mobility, Gait training, Compensatory technique education, Continued evaluation, Spoke to MD, Spoke to nursing, Spoke to case management, Spoke to advanced practitioner, OT, Family  Equipment Recommended: (TBD)       See flowsheet documentation for full assessment, interventions and recommendations  Outcome: Progressing  Note:   Prognosis: Good  Problem List: Decreased strength, Decreased range of motion, Decreased endurance, Impaired balance, Decreased mobility, Decreased cognition, Decreased skin integrity, Pain, Orthopedic restrictions, Impaired sensation  Assessment: Pt seen for PT treatment session  Pts gait is slightly unsteady w/ Rollator walker  Pt fatigues w/ amb, and requires seated rest breaks  Pt is able to manage brakes on rollator walker  Pt did amb an increased amb distance today  Barriers to Discharge: Inaccessible home environment, Decreased caregiver support     Recommendation: Short-term skilled PT     PT - OK to Discharge: Yes    See flowsheet documentation for full assessment

## 2020-01-10 ENCOUNTER — PATIENT OUTREACH (OUTPATIENT)
Dept: CASE MANAGEMENT | Facility: HOSPITAL | Age: 80
End: 2020-01-10

## 2020-01-10 LAB
ANION GAP SERPL CALCULATED.3IONS-SCNC: 11 MMOL/L (ref 5–14)
BUN SERPL-MCNC: 70 MG/DL (ref 5–25)
CALCIUM SERPL-MCNC: 9.7 MG/DL (ref 8.4–10.2)
CHLORIDE SERPL-SCNC: 107 MMOL/L (ref 97–108)
CO2 SERPL-SCNC: 23 MMOL/L (ref 22–30)
CREAT SERPL-MCNC: 3.56 MG/DL (ref 0.7–1.5)
GFR SERPL CREATININE-BSD FRML MDRD: 15 ML/MIN/1.73SQ M
GLUCOSE P FAST SERPL-MCNC: 94 MG/DL (ref 70–99)
GLUCOSE SERPL-MCNC: 94 MG/DL (ref 70–99)
PHOSPHATE SERPL-MCNC: 4.1 MG/DL (ref 2.5–4.8)
POTASSIUM SERPL-SCNC: 4.3 MMOL/L (ref 3.6–5)
SODIUM SERPL-SCNC: 141 MMOL/L (ref 137–147)

## 2020-01-10 PROCEDURE — 97110 THERAPEUTIC EXERCISES: CPT

## 2020-01-10 PROCEDURE — 97116 GAIT TRAINING THERAPY: CPT

## 2020-01-10 PROCEDURE — 99232 SBSQ HOSP IP/OBS MODERATE 35: CPT | Performed by: INTERNAL MEDICINE

## 2020-01-10 PROCEDURE — 97530 THERAPEUTIC ACTIVITIES: CPT

## 2020-01-10 PROCEDURE — 84100 ASSAY OF PHOSPHORUS: CPT | Performed by: INTERNAL MEDICINE

## 2020-01-10 PROCEDURE — 80048 BASIC METABOLIC PNL TOTAL CA: CPT | Performed by: INTERNAL MEDICINE

## 2020-01-10 PROCEDURE — 97129 THER IVNTJ 1ST 15 MIN: CPT

## 2020-01-10 RX ADMIN — SODIUM BICARBONATE 650 MG: 650 TABLET ORAL at 08:42

## 2020-01-10 RX ADMIN — ATORVASTATIN CALCIUM 40 MG: 80 TABLET, FILM COATED ORAL at 17:04

## 2020-01-10 RX ADMIN — GABAPENTIN 300 MG: 300 CAPSULE ORAL at 08:41

## 2020-01-10 RX ADMIN — TAMSULOSIN HYDROCHLORIDE 0.8 MG: 0.4 CAPSULE ORAL at 16:38

## 2020-01-10 RX ADMIN — DIPHENHYDRAMINE HCL 25 MG: 25 TABLET ORAL at 17:57

## 2020-01-10 RX ADMIN — WARFARIN SODIUM 3 MG: 3 TABLET ORAL at 17:05

## 2020-01-10 RX ADMIN — SODIUM BICARBONATE 650 MG: 650 TABLET ORAL at 17:06

## 2020-01-10 RX ADMIN — PANTOPRAZOLE SODIUM 40 MG: 40 TABLET, DELAYED RELEASE ORAL at 08:41

## 2020-01-10 RX ADMIN — METOPROLOL SUCCINATE 25 MG: 25 TABLET, EXTENDED RELEASE ORAL at 08:41

## 2020-01-10 RX ADMIN — ISOSORBIDE DINITRATE 20 MG: 10 TABLET ORAL at 16:38

## 2020-01-10 RX ADMIN — ISOSORBIDE DINITRATE 20 MG: 10 TABLET ORAL at 08:41

## 2020-01-10 RX ADMIN — DORZOLAMIDE HYDROCHLORIDE AND TIMOLOL MALEATE 1 DROP: 20; 5 SOLUTION/ DROPS OPHTHALMIC at 17:05

## 2020-01-10 NOTE — OCCUPATIONAL THERAPY NOTE
OCCUPATIONAL THERAPY TREATMENT AND PROGRESS NOTE     DOMI: 8258-9110 54 min   PAIN: 8/10 R Foot/Ankle; L shoulder   COGNITION: impaired, decreased short term memory, decreased recall of safety precautions, requires increased time and repeat cues for cognitive processing   PRECAUTIONS: bed alarm, chair alarm, cognitive, fall risk       GOAL EXPIRATION: 1/17/2020  TREATMENT DAY: 7  DISCHARGE RECOMMENDATION: home OT and family support      HOME SET UP INFO:  · Lives in a house with 1 SHERI (high) doorway for support  · Tub/shower unit + GB, + Shower chair  · Does NOT take rollator into bathroom  · DME: Moisés Bun, GB  · Lives with daughter  · Daughter provides CS for community support   · -   · Sleeps in reg bed with LE elevated on pillows    TREATMENT:    Pt made aware of need for functional update  Discussed, at length, POC goals, progress from IE to current performance, and anticipated d/c  Pt an active participant in discussion of progress while on TCF  Session focused on don/doff footwear , bed mobility, functional mobility, functional transfers, cognitive assessment and tub transfer   [facility] completed this date d/t significant visual impairments -- 18/22  Completed tub transfers as per chart review documentation and pt report -- CGA for balance during txfr -- dtr A at home  Pt with increased fatigue this session, requesting to remain in bed at conclusion of session  Irritation to B/L eyes with drainage -- reported to nursing, cold/damp washcloth applied for comfort  Pt limited by pain, ROM, weakness, fatigue, endurance, activity tolerance, visual deficits, and standing tolerance  Pt educated on safe functional transfer techniques with appropriate body mechanics, fall prevention,, activity modification techniques for energy conservation and safe discharge planning/ safety at home    Pt would benefit from continued OT sessions to focus on the above noted deficits to maximize independence and reduce caregiver burden  Pt supine with HOB elevated and call bell in reach to conclude session       PROGRESS NOTE:    Goals STG achieved within 2 weeks Performance at Initial Evaluation 1/3/2020  Current Performance (last date completed)   Grooming while standing at sink S  MET S seated 1/9 MI oral care and hand washing ([employer] provided patient with DS for overall safety as he reported fatigued with no LOB however did sit on rollator post completion of task to rest (supervision needed for transfer)  1/8 supervision (easily fatigued)  1/4 S seated    ADL transfers  MI  MET S 1/10 MI from rollator, low soft armchair, recliner S with SPT when seated on rollator    1/9 STS MI (increased time/effort PRN); SPT Supervision   1/8   STS MI, SPT Supervision (cues for safety as patient will twist self quickly with sit on rollator when fatigued vs taking appropriate steps  1/6 STS min to CGA/CS; SPT CS + rollator  1/4 S standard height, Orly from lower surface (couch)   Bathroom mobility - reports bathroom not accessible with rollator  S  PROGRESSING CGA 1/9 Supervision + Rollator use  1/8  CS + rollator   1/4 CGA with Rollator   UB ADL  MI  PROGRESSING  (1/9) Min A 1/9 MI- set up assist provided only (increased time/effort)  1/4 Min A   LB ADL, AE PRN S  PROGRESSING Mod A 1/10 shoes only MI   1/9 MI-- Set up assist along with increased time for completion (limts by pain)   1/4 Mod A   Toileting/clothing management and hygiene S  MET Min A 1/9 MI-- DS safety without issue   1/3 Min A- A with pants from floor   Dynamic standing balance for increased safety when completing purposeful tasks F+  MET P+ 1/10 Fair+  1/8 Fair/Fair- static and light dynamic challenge  1/4 P+   Increase standing tolerance for inc'd safety with standing purposeful tasks 4-7 min  MET continue  2 min 1/10 multiple trials 3 min ambulation -- able to self initiate seated rest breaks   1/9 sinkside and mobility with ADL 4 5 minutes (fatigued post and needed supervision to sit safely)  1/8 3 min average before needing to rest 2* fatigue and right LE leg pain  1/6 2 min average-- easily fatigued  1/4 2 min    Pt will demonstrate appropriate visual compensatory strategies with ADL tasks/therapuetic activities  Ind / 0 VC's   PROGRESSING  1/10   1/10 with ambulation- able to utilize appropriate visual scanning techs   1/8 at this time patient with increased swelling to eyes limiting tolerance to activity = itching   Patient provided with cool washcloth for comfort     1/6 reports use of sunglasses post recommendations of use at home  Participate in therex 1-3x/week for inc'd overall stamina/activity tolerance for purposeful tasks To be completed   MET- continue   1/9  right UE 2# hand weight shoulder flex, chest press, biceps and horizontal abduction bilaterally 2x10, PROM left shoulder as tolerated with ROM 65*     1/6 2# hand weight completing 2x5 chest press, x10 side/side, x10 circles, x10 biceps  1/3 L shoulder 3-/5    Participate in further cognitive testing to assist with safe d/c planning To be completed  MET    1/10 MOCA Blind d/t significant visual impairments 18/22 1/6  A&Ox4   Pleasant and cooperative   Able to state how he manages tub at home which appears to be accurate     1/3 A&O x4 -- increased time required for processing    Tub/shower (+) GBs and shower chair  S  PROGRESSING Unable to assess 1/10 simulated as described this date and documented 1/6; CGA with txfr -- dtr provides A at home   1/6 Overall min assist provided for overall safety as patient very fatigued this date along with reports of eyes hurting   Daughter always assists  Huey wyatt set up as follows-- sits on toilet and gets left leg into tub, stands with daughter assist PRN and reaches for grab bars to lift right leg over 16" tub wall and sits on shower chair   Patient then reverses to get out of tub      Kitchen mobility/light meal prep for inc'd independence and safety negotiating kitchen environment  S  PROGRESSING  (1/8) Unable to assess *1/8 Supervision beverage only -- see assessment above for details and recommendations    1/6 CS + Rollator simulated environment   Bed mobility with HOB flat  MI  PROGRESSING  1/10 Min A 1/10 Sit<>sup MI   1/9 Min A sit<>sup, LE management      Pt making gains toward OT goals  Participated in 7/7 OT sessions  Pt achieved 7/14 goals as per POC  Pt limited by pain, ROM, weakness, fatigue, endurance, activity tolerance, visual deficits, and standing tolerance  Remains below baseline status  Continue OT 5-7x/ wk to achieve goals and maximize function  POC remains appropriate at this time, ends 1/17/2020  [facility] scheduled for Monday 1/13 to determine safe d/c plans  Recommending home OT with family support           Kanwal Uribe 86, OTR/L

## 2020-01-10 NOTE — SOCIAL WORK
Patient's daughter Sonia called SW (C: [phone])  Sonia and sister Pamela Marc decided that since Pamela Marc is feeling sick that pt will go to live with Sonia and possibly permanently  Sonia would like medical clearance from physician that he can fly to [place]  Attending confirmed this is ok  Plan is that patient will discharge to [facility] and then daughters will make flight arrangements to [place]  They also plan to pay for assistance at the airport  [facility] has 8 steps with close B HR + landing + 8 steps with wider B HR  There is a first floor set-up  Sonia will take pt to new [facility] in [place] and have their office set-up patient with  VNA services at discharge  Sonia would like to be contacted for meeting at her cell or have sister Pamela Marc call her during meeting  Plan for discharge early-mid next week per therapy  Zoey's address is [address] [address]

## 2020-01-10 NOTE — PROGRESS NOTES
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 as well as component of cardiorenal syndrome and age-related nephron loss  Baseline creatinine reported around 2 8-3 2  Follows with Dr Julianna Ramsey as an outpatient  Previously followed with a nephrologist in [place]  3  Chronic systolic congestive heart failure with ischemic cardiomyopathy status post ICD placement  Will hold diuretics for today given slightly worsening kidney function and increase fluid restriction to 1 8 L daily  Volume status acceptable except bilateral feet edema  Follow daily weight  His weight is stable  Follow labs in the morning    4  Anemia chronic kidney disease, hemoglobin at goal at 10 3 01/06  5  Chronic pruritus, further management per primary team   Phosphorus 4 1 today    6  Chronic abdominal pain  My plan and recommendations were discussed with Dr Karely Diane from Internal Medicine team    SUBJECTIVE:  Patient seen and examined, states he is feeling thirsty, denies any chest pain or shortness of Breath  Denies any urinary problems        OBJECTIVE:  Current Weight: Weight - Scale: 71 2 kg (156 lb 15 5 oz)  Vitals:    01/10/20 0656   BP: 149/74   Pulse: 63   Resp: 19   Temp: 97 9 °F (36 6 °C)   SpO2:        Intake/Output Summary (Last 24 hours) at 1/10/2020 0759  Last data filed at 1/10/2020 3523  Gross per 24 hour   Intake 360 ml   Output 550 ml   Net -190 ml     General: conscious, cooperative, in not acute distress  Eyes: conjunctivae pale, anicteric sclerae  ENT: lips and mucous membranes moist  Neck: supple, no JVD  Chest: clear breath sounds bilateral, no crackles, ronchus or wheezings  CVS: distinct S1 & S2, normal rate, regular rhythm  Abdomen: soft, non-tender, non-distended, normoactive bowel sounds  Back: no CVA tenderness  Extremities:  Bilateral feet edema, no edema of both legs  Skin: no rash  Neuro: awake, alert, oriented            Medications:    Current Facility-Administered Medications:     acetaminophen (TYLENOL) tablet 650 mg, 650 mg, Oral, Q6H PRN, AUDREY Castañeda, 650 mg at 01/03/20 1209    atorvastatin (LIPITOR) tablet 40 mg, 40 mg, Oral, After Bill Espino MD, 40 mg at 01/09/20 1702    diphenhydrAMINE (BENADRYL) tablet 25 mg, 25 mg, Oral, Q6H PRN, Carmelita Bruce MD, 25 mg at 01/09/20 1657    dorzolamide-timolol (COSOPT) 22 3-6 8 MG/ML ophthalmic solution 1 drop, 1 drop, Both Eyes, BID, Radha Albarran MD    gabapentin (NEURONTIN) capsule 300 mg, 300 mg, Oral, Daily, AUDREY Castañeda, 300 mg at 01/09/20 0841    hydrocortisone 1 % cream, , Topical, 4x Daily PRN, Carmelita Bruce MD    isosorbide dinitrate (ISORDIL) tablet 20 mg, 20 mg, Oral, BID after meals, Radha Albarran MD, 20 mg at 01/09/20 0841    metoprolol succinate (TOPROL-XL) 24 hr tablet 25 mg, 25 mg, Oral, Daily, Najma Peace DO, 25 mg at 01/09/20 0841    pantoprazole (PROTONIX) EC tablet 40 mg, 40 mg, Oral, Daily, AUDREY Castañeda, 40 mg at 01/09/20 0630    polyethylene glycol (MIRALAX) packet 17 g, 17 g, Oral, Daily PRN, Radha Albarran MD    polyvinyl alcohol (LIQUIFILM TEARS) 1 4 % ophthalmic solution 1 drop, 1 drop, Both Eyes, Q3H PRN, Radha Albarran MD    senna-docusate sodium (SENOKOT S) 8 6-50 mg per tablet 1 tablet, 1 tablet, Oral, BID PRN, Radha Albarran MD, 1 tablet at 01/07/20 2004    sodium bicarbonate tablet 650 mg, 650 mg, Oral, BID after meals, Radha Albarran MD, 650 mg at 01/09/20 1657    tamsulosin (FLOMAX) capsule 0 8 mg, 0 8 mg, Oral, Daily With Dinner, Rita Carry, CRNP, 0 8 mg at 01/09/20 1657    tuberculin injection 5 Units, 5 Units, Intradermal, PRN, Garfield Aleman MD, 5 Units at 01/03/20 1702    warfarin (COUMADIN) tablet 3 mg, 3 mg, Oral, Daily (warfarin), Kristavivien Souza, CRNP, 3 mg at 01/09/20 1702    Invasive Devices:        Lab Results:   Results from last 7 days   Lab Units 01/10/20  0512 01/06/20  0428   WBC Thousand/uL  --  4 10*   HEMOGLOBIN g/dL  --  10 3*   HEMATOCRIT %  --  31 0*   PLATELETS Thousands/uL  --  191   SODIUM mmol/L 141 137   POTASSIUM mmol/L 4 3 4 0   CHLORIDE mmol/L 107 106   CO2 mmol/L 23 18*   BUN mg/dL 70* 65*   CREATININE mg/dL 3 56* 3 39*   CALCIUM mg/dL 9 7 9 6   PHOSPHORUS mg/dL 4 1  --          Portions of the record may have been created with voice recognition software  Occasional wrong word or "sound a like" substitutions may have occurred due to the inherent limitations of voice recognition software  Read the chart carefully and recognize, using context, where substitutions have occurred  If you have any questions, please contact the dictating provider

## 2020-01-10 NOTE — PLAN OF CARE
Problem: OCCUPATIONAL THERAPY ADULT  Goal: Performs self-care activities at highest level of function for planned discharge setting  See evaluation for individualized goals  Description  Treatment Interventions: ADL retraining, Visual perceptual retraining, Functional transfer training, UE strengthening/ROM, Endurance training, Cognitive reorientation, Patient/family training, Equipment evaluation/education, Neuromuscular reeducation, Fine motor coordination activities, Compensatory technique education, Continued evaluation, Energy conservation, Activityengagement          See flowsheet documentation for full assessment, interventions and recommendations  Outcome: Progressing  Note:   Limitation: Decreased ADL status, Decreased UE ROM, Decreased UE strength, Decreased Safe judgement during ADL, Decreased cognition, Decreased endurance, Decreased sensation, Visual deficit, Decreased fine motor control, Decreased self-care trans, Decreased high-level ADLs  Prognosis: Good, Fair  Assessment: Pt made aware of need for functional update  Discussed, at length, POC goals, progress from IE to current performance, and anticipated d/c  Pt an active participant in discussion of progress while on TCF  Session focused on don/doff footwear , bed mobility, functional mobility, functional transfers, cognitive assessment and tub transfer   [facility] completed this date d/t significant visual impairments -- 18/22  Completed tub transfers as per chart review documentation and pt report -- CGA for balance during txfr -- dtr A at home  Pt with increased fatigue this session, requesting to remain in bed at conclusion of session  Irritation to B/L eyes with drainage -- reported to nursing, cold/damp washcloth applied for comfort  Pt limited by pain, ROM, weakness, fatigue, endurance, activity tolerance, visual deficits, and standing tolerance   Pt educated on safe functional transfer techniques with appropriate body mechanics, fall prevention,, activity modification techniques for energy conservation and safe discharge planning/ safety at home   Pt would benefit from continued OT sessions to focus on the above noted deficits to maximize independence and reduce caregiver burden  Pt supine with HOB elevated and call bell in reach to conclude session       OT Discharge Recommendation: Home OT(with additional family support )  OT - OK to Discharge: No

## 2020-01-11 LAB
ANION GAP SERPL CALCULATED.3IONS-SCNC: 10 MMOL/L (ref 5–14)
BUN SERPL-MCNC: 70 MG/DL (ref 5–25)
CALCIUM SERPL-MCNC: 9.5 MG/DL (ref 8.4–10.2)
CHLORIDE SERPL-SCNC: 107 MMOL/L (ref 97–108)
CO2 SERPL-SCNC: 22 MMOL/L (ref 22–30)
CREAT SERPL-MCNC: 3.58 MG/DL (ref 0.7–1.5)
GFR SERPL CREATININE-BSD FRML MDRD: 15 ML/MIN/1.73SQ M
GLUCOSE P FAST SERPL-MCNC: 117 MG/DL (ref 70–99)
GLUCOSE SERPL-MCNC: 117 MG/DL (ref 70–99)
POTASSIUM SERPL-SCNC: 3.9 MMOL/L (ref 3.6–5)
SODIUM SERPL-SCNC: 139 MMOL/L (ref 137–147)

## 2020-01-11 PROCEDURE — 97110 THERAPEUTIC EXERCISES: CPT

## 2020-01-11 PROCEDURE — 99232 SBSQ HOSP IP/OBS MODERATE 35: CPT | Performed by: INTERNAL MEDICINE

## 2020-01-11 PROCEDURE — 97530 THERAPEUTIC ACTIVITIES: CPT

## 2020-01-11 PROCEDURE — 97535 SELF CARE MNGMENT TRAINING: CPT

## 2020-01-11 PROCEDURE — 80048 BASIC METABOLIC PNL TOTAL CA: CPT | Performed by: INTERNAL MEDICINE

## 2020-01-11 RX ADMIN — WARFARIN SODIUM 3 MG: 3 TABLET ORAL at 17:21

## 2020-01-11 RX ADMIN — SODIUM BICARBONATE 650 MG: 650 TABLET ORAL at 09:59

## 2020-01-11 RX ADMIN — ACETAMINOPHEN 650 MG: 325 TABLET ORAL at 04:58

## 2020-01-11 RX ADMIN — ISOSORBIDE DINITRATE 20 MG: 10 TABLET ORAL at 09:59

## 2020-01-11 RX ADMIN — DORZOLAMIDE HYDROCHLORIDE AND TIMOLOL MALEATE 1 DROP: 20; 5 SOLUTION/ DROPS OPHTHALMIC at 09:59

## 2020-01-11 RX ADMIN — DORZOLAMIDE HYDROCHLORIDE AND TIMOLOL MALEATE 1 DROP: 20; 5 SOLUTION/ DROPS OPHTHALMIC at 17:21

## 2020-01-11 RX ADMIN — METOPROLOL SUCCINATE 25 MG: 25 TABLET, EXTENDED RELEASE ORAL at 10:00

## 2020-01-11 RX ADMIN — DIPHENHYDRAMINE HCL 25 MG: 25 TABLET ORAL at 12:44

## 2020-01-11 RX ADMIN — GABAPENTIN 300 MG: 300 CAPSULE ORAL at 09:59

## 2020-01-11 RX ADMIN — PANTOPRAZOLE SODIUM 40 MG: 40 TABLET, DELAYED RELEASE ORAL at 06:01

## 2020-01-11 RX ADMIN — ATORVASTATIN CALCIUM 40 MG: 80 TABLET, FILM COATED ORAL at 17:21

## 2020-01-11 RX ADMIN — SODIUM BICARBONATE 650 MG: 650 TABLET ORAL at 17:21

## 2020-01-11 RX ADMIN — ISOSORBIDE DINITRATE 20 MG: 10 TABLET ORAL at 17:21

## 2020-01-11 RX ADMIN — TAMSULOSIN HYDROCHLORIDE 0.8 MG: 0.4 CAPSULE ORAL at 17:21

## 2020-01-11 NOTE — PHYSICAL THERAPY NOTE
PT Treatment Note (38 mins 4411-0711)     01/11/20 0901   Pain Assessment   Pain Assessment 0-10   Pain Score 7   Pain Type Chronic pain   Pain Location Leg;Groin   Pain Orientation Right   Pain Radiating Towards distally   Pain Descriptors Aching   Pain Frequency Constant/continuous   Pain Onset Ongoing   Clinical Progression Not changed   Effect of Pain on Daily Activities painful with mvmt   Patient's Stated Pain Goal No pain   Hospital Pain Intervention(s) Medication (See MAR)   Diversional Activities Television   Restrictions/Precautions   Weight Bearing Precautions Per Order No   Other Precautions Visual impairment;Hard of hearing;Pain; Fall Risk   General   Chart Reviewed Yes   Response to Previous Treatment Patient with no complaints from previous session  Family/Caregiver Present No   Cognition   Overall Cognitive Status Impaired   Arousal/Participation Alert; Cooperative   Attention Within functional limits   Orientation Level Oriented to person;Oriented to place   Memory Decreased short term memory;Decreased recall of recent events   Following Commands Follows one step commands without difficulty   Subjective   Subjective "I'm doing fine   People keep asking me about where I'm going but I don't know"   Transfers   Sit to Stand 5  Supervision   Stand to Sit 5  Supervision   Stand pivot 5  Supervision   Additional Comments rollator for amb   Ambulation/Elevation   Gait pattern Foward flexed;Decreased foot clearance   Gait Assistance 5  Supervision   Additional items Assist x 1;Verbal cues   Distance 160ft x 2, 60ft x 1,   Stair Management Assistance   (CGA)   Additional items Assist x 1;Verbal cues   Stair Management Technique Two rails   Number of Stairs 8  (2+2)   Balance   Static Sitting Good   Dynamic Sitting Good   Static Standing Fair +   Dynamic Standing Fair +   Ambulatory Fair +   Endurance Deficit   Endurance Deficit Yes   Activity Tolerance   Activity Tolerance Patient tolerated treatment well Medical Staff Made Aware RN   Nurse Made Aware yes   Exercises   Heelslides 20 reps; Sitting;Bilateral   Glute Sets 20 reps; Sitting;Bilateral   Hip Flexion 20 reps; Sitting;Bilateral   Hip Abduction 20 reps; Sitting;Bilateral   Hip Adduction 20 reps; Sitting;Bilateral   Knee AROM Long Arc Quad 20 reps; Sitting;Bilateral   Ankle Pumps 20 reps; Sitting;Bilateral   UE Exercise   (bicep curls RUE 3#)   Neuro re-ed assisted pt in standing toileting - pt maintained unsupported standing balance with CGA for clothing mgmt due to gown  Balance training  Seated Balloon Toss x 5 mins with rest breaks as needed   Assessment   Prognosis Good   Problem List Decreased strength;Decreased range of motion;Decreased endurance; Impaired balance;Decreased mobility; Impaired hearing; Impaired vision;Pain   Assessment Pt completed therapy with excellent peformance  Pt ascended/descended stairs, completed all transfers (S) and ambulated improved distance  Continue with current POC     Goals   STG Expiration Date 01/17/20   Short Term Goal #1 see grid   PT Treatment Day 8   Plan   Treatment/Interventions   (cont POC)   Progress Progressing toward goals   PT Frequency   (5-7x/wk)   Recommendation   Recommendation Home with family support   PT - OK to Discharge Yes   Additional Comments when medically stable     Goals STG achieved within  2 weeks Performance at Initial Evaluation (01/03/20) Current Performance (last date completed)   Supine to sit transitions to increase ease of transfer, allow pt to get out of bed, and decrease caregiver burden    MOD I     PROGRESSING Supervision   - HOB flat  - No rail 01/08/20   Sit to supine transitions to increase ease of transfer, allow pt to get back into bed, and decrease caregiver burden MOD I  PROGRESSING Supervision   - HOB flat  - No rail 01/8/20   Functional transfers to facilitate safe return to previous living environment     MOD I  PROGRESSING Sit <->stand tranfers: CG A     SPT with Rollator: RYAN RUBIN 01/11/20   Ambulation with rollator for 50 feet   ( for safe household distance ambulation)     MOD I  PROGRESSING Ambulate with rollator 17 feet with CG A 01/11/20   Ambulation with rollator for > or = 300 feet ( to initiate short community distance ambulation, Supervised by daughter)     Mod I  PROGRESSING Unable to ambulate > 17 feet with rollator ; due to very low blood pressure 01/11/20       Ascend/descend a curb step, using appropriate AD and method, no handrails, for safe access to previous living environment Mod I  PROGRESSING Deferred today -> due to very low BP 1/7/20      Ascend/descend 8+8 stairs to enter [facility] home upon moving there post d/c           Mod I   PROGRESSING                 N/A    1/11/20  MARIA GUADALUPE Olmos, PT

## 2020-01-11 NOTE — OCCUPATIONAL THERAPY NOTE
Occupational Therapy Treatment Note  [id number]  58 min       Rembertoreinier Shields    Patient Active Problem List   Diagnosis    Periorbital edema    Chronic systolic congestive heart failure (HCC)    Renal disorder    GERD (gastroesophageal reflux disease)    Paroxysmal atrial fibrillation (HCC)    HTN (hypertension)    Acute renal failure superimposed on stage 4 chronic kidney disease (HCC)    CKD (chronic [facility] disease), stage IV ([facility] )    Secondary hyperparathyroidism ([facility] )    Hypercalcemia    BPH (benign prostatic hyperplasia)    [facility]peripheral [facility]) [facility]    Hyperkalemia    Age-related cognitive decline    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)    Chronic kidney disease-mineral and bone disorder    Dementia with psychosis ([facility] )    Chronic atrial fibrillation    Macular atrophy, retinal    Chest pain    Generalized abdominal pain    MGUS (monoclonal gammopathy of unknown significance)    Pulmonary nodule    Ambulatory dysfunction    Coronary artery disease    Mixed hyperlipidemia    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 ([facility] )    Hypertensive heart disease with chronic combined systolic and diastolic congestive heart failure ([facility])       Past Medical History:   Diagnosis Date    A-fib ([place] [address] )     CHF (congestive heart failure) ([facility])     CKD (chronic kidney disease) stage 4, GFR 15-29 ml/min ([facility])     GERD (gastroesophageal reflux disease)     Glaucoma     History of transfusion     Hypertension     Renal disorder        TIME: 5497-7445 39 min   VITALS: none taken; stable   PAIN: groin, hip, leg, feet, L shoulder "hurts, that's all, it hurts"   COGNITION: impaired, decreased short term memory, decreased recall of safety precautions, requires increased time and repeat cues for cognitive processing  PRECAUTIONS: bed alarm, chair alarm, cognitive, fall risk     EXPIRATION DATE: 1/17/2020  TREATMENT DAY: 8  DISCHARGE RECOMMENDATION: Home OT with family support ** Plan is for d/c home -- family to make plans for transport to [place]  See SW note 1/10 with future home setup  HOME SETUP: Lives in a house with 1 SHERI (high) doorway for support  ? Tub/shower unit + GB, + Shower chair  ? Does NOT take rollator into bathroom  ? DME: Kaylyn Guerrero, GB  ? Lives with daughter  ? Daughter provides CS for community support   ? -   ? Sleeps in reg bed with LE elevated on pillows  ADDITIONAL COMMENTS:     SESSION DETAILS / ASSESSMENT:    Pt pleasant and cooperative during AM ADL with focus on bathing, UB/LB dressing, oral hygiene/grooming, don/doff footwear  Completed Independently post setup of basin/towels (no item retrieval)  Noted improvements with all aspects of ADL this AM    Pt demonstrates improvements towards POC goals in the following areas: overall seated activity tolerance, ADL performance  Pt limited by visual deficits,  pain, weakness and fatigue   Pt educated on safe functional transfer techniques with appropriate body mechanics, activity modification techniques for energy conservation and safe discharge planning/ safety at home   Patient to benefit from continued Occupational Therapy treatment while in the hospital to address deficits as defined above and maximize level of functional independence with ADLs and functional mobility  Pt seated in recliner with call bell in reach to conclude session          Goals STG achieved within 2 weeks Performance at Initial Evaluation 1/3/2020  Current Performance (last date completed)   Grooming while standing at sink S  MET S seated 1/11 MI   1/9 MI oral care and hand washing ([employer] provided patient with DS for overall safety as he reported fatigued with no LOB however did sit on rollator post completion of task to rest (supervision needed for transfer)     ADL transfers  MI  MET S 1/11 MI   1/10 MI from rollator, low soft armchair, recliner S with SPT when seated on rollator     Bathroom mobility - reports bathroom not accessible with rollator  S   CGA 1/9 Supervision + Rollator use  1/8  CS + rollator   1/4 CGA with Rollator   UB ADL  MI  (1/9, 1/11 Min A 1/11  MI   LB ADL, AE PRN S  1/11   Mod A 1/11 MI underwear, socks   1/10 shoes only MI   1/9 MI-- Set up assist along with increased time for completion (limts by pain)   1/4 Mod A   Toileting/clothing management and hygiene S  MET Min A 1/9 MI-- DS safety without issue   1/3 Min A- A with pants from floor   Dynamic standing balance for increased safety when completing purposeful tasks F+  MET P+ 1/11 Fair+  1/8 Fair/Fair- static and light dynamic challenge  1/4 P+   Increase standing tolerance for inc'd safety with standing purposeful tasks 4-7 min  MET continue  2 min 1/10 multiple trials 3 min ambulation -- able to self initiate seated rest breaks   1/9 sinkside and mobility with ADL 4 5 minutes (fatigued post and needed supervision to sit safely)  1/8 3 min average before needing to rest 2* fatigue and right LE leg pain  1/6 2 min average-- easily fatigued  1/4 2 min    Pt will demonstrate appropriate visual compensatory strategies with ADL tasks/therapuetic activities  Ind / 0 VC's   1/10 1/11   1/11 (+) visual scanning with ADL   1/10 with ambulation- able to utilize appropriate visual scanning techs   1/8 at this time patient with increased swelling to eyes limiting tolerance to activity = itching   Patient provided with cool washcloth for comfort     1/6 reports use of sunglasses post recommendations of use at home  Participate in therex 1-3x/week for inc'd overall stamina/activity tolerance for purposeful tasks To be completed   MET- continue   1/9  right UE 2# hand weight shoulder flex, chest press, biceps and horizontal abduction bilaterally 2x10, PROM left shoulder as tolerated with ROM 65*     1/6 2# hand weight completing 2x5 chest press, x10 side/side, x10 circles, x10 biceps  1/3 L shoulder 3-/5    Participate in further cognitive testing to assist with safe d/c planning To be completed  MET    1/10 MOCA Blind d/t significant visual impairments 18/22 1/6  A&Ox4   Pleasant and cooperative   Able to state how he manages tub at home which appears to be accurate     1/3 A&O x4 -- increased time required for processing    Tub/shower (+) GBs and shower chair  S   Unable to assess 1/10 simulated as described this date and documented 1/6; CGA with txfr -- dtr provides A at home   1/6 Overall min assist provided for overall safety as patient very fatigued this date along with reports of eyes hurting   Daughter always assists  Kat Avery states set up as follows-- sits on toilet and gets left leg into tub, stands with daughter assist PRN and reaches for grab bars to lift right leg over 16" tub wall and sits on shower chair   Patient then reverses to get out of tub      Kitchen mobility/light meal prep for inc'd independence and safety negotiating kitchen environment  S  (1/8) Unable to assess *1/8 Supervision beverage only -- see assessment above for details and recommendations    1/6 CS + Rollator simulated environment   Bed mobility with HOB flat  MI  1/10 Min A 1/10 Sit<>sup MI   1/9 Min A sit<>sup, LE management          Lauro Zamora, MS, OTR/L

## 2020-01-11 NOTE — PLAN OF CARE
Problem: PHYSICAL THERAPY ADULT  Goal: Performs mobility at highest level of function for planned discharge setting  See evaluation for individualized goals  Description  Treatment/Interventions: ADL retraining, Functional transfer training, LE strengthening/ROM, Elevations, Therapeutic exercise, Endurance training, Cognitive reorientation, Patient/family training, Equipment eval/education, Bed mobility, Gait training, Compensatory technique education, Continued evaluation, Spoke to MD, Spoke to nursing, Spoke to case management, Spoke to advanced practitioner, OT, Family  Equipment Recommended: (TBD)       See flowsheet documentation for full assessment, interventions and recommendations  Outcome: Progressing  Note:   Prognosis: Good  Problem List: Decreased strength, Decreased range of motion, Decreased endurance, Impaired balance, Decreased mobility, Impaired hearing, Impaired vision, Pain  Assessment: Pt seen for PT treatment session today, initiated stair training as he will be moving to [facility] with 8+8 SHERI soon after d/c  He was able to complete 8 steps sequentially on practive stairs, will attempt FF next session  He is making progress in all areas of treatment able to progress ambulation distance today, pt ambulates significantly better with shoes on, shoes are in closet in room  He is progressing all LE exercises in resistance and reps  Will continue to focus on ambulation tolerance and LE strengthening to improve tolerance for stair training  At end of session pt left supine in bed as he requested 2* to increased fatigue after todays session, required min A for LE management into bed, left with all needs in reach and all questions answered  Barriers to Discharge: Inaccessible home environment, Decreased caregiver support     Recommendation: Home with family support, Home PT     PT - OK to Discharge: Yes    See flowsheet documentation for full assessment

## 2020-01-11 NOTE — PROGRESS NOTES
ccConsultation - Nephrology   Sosa Gutierrez [de-identified] y o  male MRN: [id number]  Unit/Bed#: -69 Encounter: [id number]  ASSESSMENT and PLAN:  Sosa Gutierrez reason 79-year-old male with past medical history of Chronic Kidney Disease IV, previous temporary dialysis in 2016, hypertension, CRS, DVT status post IVC filter, CVA, BPH, CAD status post CABG in 2016 who was recently admitted with complaints of chest pain and abdominal pain  Nephrology was consulted for CARTER WILLIS, Chronic Kidney Disease and now following at rehab       ASSESSMENT/PLAN:  1  Acute kidney injury POA):  Suspect prerenal in the setting of diuretics along with progression of renal disease  -peak creatinine at 4 2 and current creatinine 3 21-baseline, however has increased to 3 56 yesterday and now 3 58  -hopefully plateaued  -diuretics currently on hold  -fluid restrictions increased to 1 8 L yesterday  -avoid nephrotoxins  -avoid hypotension  -previous urinalysis was essentially bland  -previous CT scan was negative for hydronephrosis  -will repeat bladder scan with PVR  -continue to trend I/O, lab values in volume status      2  Chronic kidney disease IV:  Suspect secondary to hypertensive nephrosclerosis, arterial nephrosclerosis and component of cardiorenal syndrome along with age-related nephron loss in the setting of decreased EF  -baseline creatinine 2 8-3 2  -previously seen as a hospital follow-up for CARTER WILLIS-has appointment to follow Dr Silveira in the \[facility]\"" office on 02/06/2019 for ongoing Chronic Kidney Disease management  -previously followed nephrologist in [facility], was early     3   Chronic systolic CHF/ischemic cardiomyopathy:  Status post ICD placement for EF of 29%  -cardiology following  -restarted on Lasix40  mg daily (previous home dose Lasix 40 mg p o  B i d )-placed on hold yesterday secondary to slight worsening of renal function  -continue with fluid restriction of 1800 mL per day  -continue to trend I/O, lab values in volume status  -current weight 71 3 kg  -I&O not well documented     4  Hypertension:  BP acceptable  -currently on Toprol XL 25 mg daily and Imdur 20 mg 2 times daily with hold parameters of SBP less than 120  -avoid episodes of hypotension  -as above diuretics currently on hold     5  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:   -will continue to trend PTH and phosphorus as outpatient  -as above has follow-up with Dr iSlveira on 02/06/2019     6  Anemia of Chronic Kidney Disease:   -hemoglobin 10 3 appears stable  -last iron stores on 07/18/2019 revealed iron 69, iron saturation 35, TIBC 198, folate 9 7  -will continue to trend       Disposition:  Per case management note from 1/10/47763 once patient discharged will be moving to [place] with the assistance of his daughter  SUBJECTIVE:  Patient seen and examined  Denies chest pain or shortness of breath    Reported     OBJECTIVE:  Current Weight: Weight - Scale: 71 3 kg (157 lb 3 oz)  Vitals:    01/11/20 0600 01/11/20 0620 01/11/20 1000 01/11/20 1634   BP:  114/58 122/64 103/57   BP Location:  Right arm  Right arm   Pulse:  61  65   Resp:  21  20   Temp:  98 8 °F (37 1 °C)  98 4 °F (36 9 °C)   TempSrc:  Temporal  Temporal   SpO2:  98%  95%   Weight: 71 3 kg (157 lb 3 oz)      Height:           Intake/Output Summary (Last 24 hours) at 1/11/2020 1636  Last data filed at 1/11/2020 1300  Gross per 24 hour   Intake 840 ml   Output 700 ml   Net 140 ml     General:  No acute distress, out of bed, cooperative,   Skin:  Warm and dry without rash  ENMT:  Mucous membranes moist, sclera anicteric, tongue is midline  Neck:  Supple without JVD  Respiratory:  Essentially clear on auscultation without crackles, rhonchi, wheezes  Cardiac:  Regular rate and rhythm without rub, or murmur  GI:  Soft, nontender, no distention, active bowel sounds  Extremities:  No significant edema noted bilaterally  Neuro:  Alert oriented and awake, no gross deficits  Psych:  Appropriate affect    Medications:    Current Facility-Administered Medications:     acetaminophen (TYLENOL) tablet 650 mg, 650 mg, Oral, Q6H PRN, AUDREY Castañeda, 650 mg at 01/11/20 0458    atorvastatin (LIPITOR) tablet 40 mg, 40 mg, Oral, After Jose Ward MD, 40 mg at 01/10/20 1704    diphenhydrAMINE (BENADRYL) tablet 25 mg, 25 mg, Oral, Q6H PRN, Rudy Medrano MD, 25 mg at 01/11/20 1244    dorzolamide-timolol (COSOPT) 22 3-6 8 MG/ML ophthalmic solution 1 drop, 1 drop, Both Eyes, BID, Tc Ji MD, 1 drop at 01/11/20 0959    gabapentin (NEURONTIN) capsule 300 mg, 300 mg, Oral, Daily, AUDREY Castañeda, 300 mg at 01/11/20 0959    hydrocortisone 1 % cream, , Topical, 4x Daily PRN, Rudy Medrano MD    isosorbide dinitrate (ISORDIL) tablet 20 mg, 20 mg, Oral, BID after meals, Tc Ji MD, 20 mg at 01/11/20 0959    metoprolol succinate (TOPROL-XL) 24 hr tablet 25 mg, 25 mg, Oral, Daily, Najma Peace DO, 25 mg at 01/11/20 1000    pantoprazole (PROTONIX) EC tablet 40 mg, 40 mg, Oral, Daily, AUDREY Castañeda, 40 mg at 01/11/20 0601    polyethylene glycol (MIRALAX) packet 17 g, 17 g, Oral, Daily PRN, Tc Ji MD    polyvinyl alcohol (LIQUIFILM TEARS) 1 4 % ophthalmic solution 1 drop, 1 drop, Both Eyes, Q3H PRN, Tc Ji MD    senna-docusate sodium (SENOKOT S) 8 6-50 mg per tablet 1 tablet, 1 tablet, Oral, BID PRN, Tc Ji MD, 1 tablet at 01/07/20 2004    sodium bicarbonate tablet 650 mg, 650 mg, Oral, BID after meals, Tc Ji MD, 650 mg at 01/11/20 0959    tamsulosin (FLOMAX) capsule 0 8 mg, 0 8 mg, Oral, Daily With Dinner, Krista Souza, KEONNP, 0 8 mg at 01/10/20 1638    tuberculin injection 5 Units, 5 Units, Intradermal, PRN, Davis Coughlin MD, 5 Units at 01/03/20 1702    warfarin (COUMADIN) tablet 3 mg, 3 mg, Oral, Daily (warfarin), AUDREY Castañeda, 3 mg at 01/10/20 1705    Laboratory Results:  Results from last 7 days   Lab Units 01/11/20  0455 01/10/20  0512 01/06/20  0428   WBC Thousand/uL  --   --  4 10*   HEMOGLOBIN g/dL  --   --  10 3*   HEMATOCRIT %  --   --  31 0*   PLATELETS Thousands/uL  --   --  191   SODIUM mmol/L 139 141 137   POTASSIUM mmol/L 3 9 4 3 4 0   CHLORIDE mmol/L 107 107 106   CO2 mmol/L 22 23 18*   BUN mg/dL 70* 70* 65*   CREATININE mg/dL 3 58* 3 56* 3 39*   CALCIUM mg/dL 9 5 9 7 9 6   PHOSPHORUS mg/dL  --  4 1  --

## 2020-01-11 NOTE — PHYSICAL THERAPY NOTE
Physical Therapy Daily Treatment and Progress Note    Patient's Name: Virgie Kussmaul    Admitting Diagnosis  Renal failure [N19]    Problem List  Patient Active Problem List   Diagnosis    Periorbital edema    Chronic systolic congestive heart failure (HCC)    Renal disorder    GERD (gastroesophageal reflux disease)    Paroxysmal atrial fibrillation (HCC)    HTN (hypertension)    Acute renal failure superimposed on stage 4 chronic kidney disease (HCC)    [facility]    Secondary hyperparathyroidism ([facility]    Hypercalcemia    BPH (benign prostatic hyperplasia)    [facility]    Hyperkalemia    Age-related cognitive decline    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)    Chronic kidney disease-mineral and bone disorder    Dementia with psychosis ([facility]    Chronic atrial fibrillation    Macular atrophy, retinal    Chest pain    Generalized abdominal pain    MGUS (monoclonal gammopathy of unknown significance)    Pulmonary nodule    Ambulatory dysfunction    Coronary artery disease    Mixed hyperlipidemia    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    Hypertensive heart disease with chronic combined systolic and diastolic congestive heart failure (HCC)       Past Medical History  Past Medical History:   Diagnosis Date    [facility]fib [facility]     CHF (congestive heart failure) (HCC)     CKD (chronic kidney disease) stage 4, GFR 15-29 ml/min (HCC)     GERD (gastroesophageal reflux disease)     Glaucoma     History of transfusion     Hypertension     Renal disorder        Past Surgical History  Past Surgical History:   Procedure Laterality Date    ABDOMINAL SURGERY      CARDIAC PACEMAKER PLACEMENT      EYE SURGERY      HERNIA REPAIR      Doesn't recall, pt states he may have      VASCULAR SURGERY         Vitals:    01/10/20 0656 01/10/20 1510 01/11/20 0600 01/11/20 0620   BP: 149/74 120/66  114/58   BP Location: Right arm Left arm  Right arm   Pulse: 63 62  61   Resp: 19 19  21   Temp: 97 9 °F (36 6 °C) 98 °F (36 7 °C)  98 8 °F (37 1 °C)   TempSrc: Temporal Temporal  Temporal   SpO2:  95%  98%   Weight:   71 3 kg (157 lb 3 oz)    Height:         PT Treatmetnt Time: 55 minutes       01/10/20 1330   Pain Assessment   Pain Assessment 0-10   Pain Score 4   Restrictions/Precautions   Weight Bearing Precautions Per Order No   Other Precautions Hard of hearing;Visual impairment;Pain; Fall Risk   General   Chart Reviewed Yes   Response to Previous Treatment Patient with no complaints from previous session  Family/Caregiver Present No   Cognition   Overall Cognitive Status Impaired   Arousal/Participation Alert; Cooperative   Following Commands Follows one step commands without difficulty   Subjective   Subjective Pt wishing he could get more reliable info from his dtrs   Bed Mobility   Sit to Supine 4  Minimal assistance   Additional items Assist x 1; Increased time required;Verbal cues;LE management   Transfers   Sit to Stand 5  Supervision   Additional items Increased time required;Armrests   Stand to Sit 5  Supervision   Additional items Armrests; Increased time required   Additional Comments Rollator used for all   Ambulation/Elevation   Gait pattern Excessively slow; Step to;Short stride;Decreased foot clearance;Decreased R stance; Steppage   Gait Assistance 5  Supervision   Additional items Assist x 1;Verbal cues   Assistive Device   (Rollator)   Distance 150' x3 100', 50'   Stair Management Assistance 4  Minimal assist   Additional items Assist x 1;Verbal cues; Tactile cues   Stair Management Technique Two rails; Step to pattern; Foreward   Number of Stairs 8  (2+2+2+2)   Balance   Static Sitting Fair +   Dynamic Sitting Fair   Static Standing Fair   Dynamic Standing Fair -   Ambulatory Fair -   Endurance Deficit   Endurance Deficit Yes   Endurance Deficit Description fatigues with ambulation - improving   Activity Tolerance   Activity Tolerance Patient tolerated treatment well   Exercises   Heelslides Sitting;10 reps  (2 sets blue tb)   Hip Flexion Sitting;10 reps  (3lbs 2 sets)   Hip Abduction Sitting;10 reps  (blue tb 2 sets)   Hip Adduction Sitting;10 reps  (manual resistance 2 sets)   Knee AROM Long Arc Quad Sitting;10 reps  (3lbs 2 sets)   Ankle Pumps Sitting;15 reps  (AAROM R; AROM L )   Assessment   Prognosis Good   Problem List Decreased strength;Decreased range of motion;Decreased endurance; Impaired balance;Decreased mobility; Impaired hearing; Impaired vision;Pain   Assessment Pt seen for PT treatment session today, initiated stair training as he will be moving [facility] with 8+8 SHERI soon after d/c  He was able to complete 8 steps sequentially on practive stairs, will attempt FF next session  He is making progress in all areas of treatment able to progress ambulation distance today, pt ambulates significantly better with shoes on, shoes are in closet in room  He is progressing all LE exercises in resistance and reps  Will continue to focus on ambulation tolerance and LE strengthening to improve tolerance for stair training  At end of session pt left supine in bed as he requested 2* to increased fatigue after todays session, required min A for LE management into bed, left with all needs in reach and all questions answered  Goals   Patient Goals To get stronger   STG Expiration Date 01/17/20   Short Term Goal #1 grid- see new stair goal   PT Treatment Day 7   Plan   Treatment/Interventions Functional transfer training;LE strengthening/ROM; Therapeutic exercise; Endurance training;Elevations; Patient/family training;Equipment eval/education; Bed mobility;Gait training; Compensatory technique education;Spoke to nursing;Spoke to case management   Progress Progressing toward goals   PT Frequency   (5-7x/wk)   Recommendation   Recommendation Home with family support;Home PT   Equipment Recommended   (Rollator)     Progress  Pt has made good progress in therapy since inititial evaluation on 1/3/20, he is nearing completion of all goals  He has improved ambulation distance significantly and is now able to tolerate household distances, ambulation goal distance increased  Ambulation goal also changed to mod I as pt will likely need to move ind when d/c  Stair goal was added as pt will be moving to dt home after d/c and will need to enter home with 8+8 SHERI  He is demonstrating good motivation consistently and has been able to steadily increase resistance and reps of LE strengthening making gains evidenced by ability to tolerate stair training and increased activity  Pt will continue to benefit from PT to strengthen LE, improve endurance and to maximize functional mobility to facilitate safe d/c home      Goals STG achieved within  2 weeks Performance at Initial Evaluation (01/03/20) Current Performance (last date completed)   Supine to sit transitions to increase ease of transfer, allow pt to get out of bed, and decrease caregiver burden    MOD I    PROGRESSING Supervision   - HOB flat  - No rail 01/08/20   Sit to supine transitions to increase ease of transfer, allow pt to get back into bed, and decrease caregiver burden MOD I  PROGRESSING Supervision   - HOB flat  - No rail 01/8/20   Functional transfers to facilitate safe return to previous living environment     MOD I  PROGRESSING Sit <->stand tranfers: CG A     SPT with Rollator: CG A 01/09/20   Ambulation with rollator for 50 feet   ( for safe household distance ambulation)     MOD I  PROGRESSING Ambulate with rollator 17 feet with CG A 01/09/20   Ambulation with rollator for > or = 300 feet ( to initiate short community distance ambulation, Supervised by daughter)     Mod I  PROGRESSING Unable to ambulate > 17 feet with rollator ; due to very low blood pressure 01/9/20       Ascend/descend a curb step, using appropriate AD and method, no handrails, for safe access to previous living environment Mod I  PROGRESSING Deferred today -> due to very low BP 1/7/20     Ascend/descend 8+8 stairs to enter dtrs home upon moving there post d/c         Mod I   PROGRESSING               N/A   1/10/20  Min SCOTTIE Blake, PT

## 2020-01-11 NOTE — PLAN OF CARE
Problem: PHYSICAL THERAPY ADULT  Goal: Performs mobility at highest level of function for planned discharge setting  See evaluation for individualized goals  Description  Treatment/Interventions: ADL retraining, Functional transfer training, LE strengthening/ROM, Elevations, Therapeutic exercise, Endurance training, Cognitive reorientation, Patient/family training, Equipment eval/education, Bed mobility, Gait training, Compensatory technique education, Continued evaluation, Spoke to MD, Spoke to nursing, Spoke to case management, Spoke to advanced practitioner, OT, Family  Equipment Recommended: (TBD)       See flowsheet documentation for full assessment, interventions and recommendations  Outcome: Progressing  Note:   Prognosis: Good  Problem List: Decreased strength, Decreased range of motion, Decreased endurance, Impaired balance, Decreased mobility, Impaired hearing, Impaired vision, Pain  Assessment: Pt completed therapy with excellent peformance  Pt ascended/descended stairs, completed all transfers (S) and ambulated improved distance  Continue with current POC  Barriers to Discharge: Inaccessible home environment, Decreased caregiver support     Recommendation: Home with family support     PT - OK to Discharge: Yes    See flowsheet documentation for full assessment

## 2020-01-11 NOTE — PLAN OF CARE
Problem: OCCUPATIONAL THERAPY ADULT  Goal: Performs self-care activities at highest level of function for planned discharge setting  See evaluation for individualized goals  Description  Treatment Interventions: ADL retraining, Visual perceptual retraining, Functional transfer training, UE strengthening/ROM, Endurance training, Cognitive reorientation, Patient/family training, Equipment evaluation/education, Neuromuscular reeducation, Fine motor coordination activities, Compensatory technique education, Continued evaluation, Energy conservation, Activityengagement          See flowsheet documentation for full assessment, interventions and recommendations  Outcome: Progressing  Note:   Limitation: Decreased ADL status, Decreased UE ROM, Decreased UE strength, Decreased Safe judgement during ADL, Decreased cognition, Decreased endurance, Decreased sensation, Visual deficit, Decreased fine motor control, Decreased self-care trans, Decreased high-level ADLs  Prognosis: Good, Fair  Assessment: Pt pleasant and cooperative during AM ADL with focus on bathing, UB/LB dressing, oral hygiene/grooming, don/doff footwear  Completed Independently post setup of basin/towels (no item retrieval)  Noted improvements with all aspects of ADL this AM    Pt demonstrates improvements towards POC goals in the following areas: overall seated activity tolerance, ADL performance  Pt limited by visual deficits,  pain, weakness and fatigue   Pt educated on safe functional transfer techniques with appropriate body mechanics, activity modification techniques for energy conservation and safe discharge planning/ safety at home   Patient to benefit from continued Occupational Therapy treatment while in the hospital to address deficits as defined above and maximize level of functional independence with ADLs and functional mobility  Pt seated in recliner with call bell in reach to conclude session        OT Discharge Recommendation: Home OT(with additional family support )  OT - OK to Discharge: No

## 2020-01-12 LAB
ANION GAP SERPL CALCULATED.3IONS-SCNC: 10 MMOL/L (ref 5–14)
BUN SERPL-MCNC: 70 MG/DL (ref 5–25)
CALCIUM SERPL-MCNC: 9.2 MG/DL (ref 8.4–10.2)
CHLORIDE SERPL-SCNC: 107 MMOL/L (ref 97–108)
CO2 SERPL-SCNC: 20 MMOL/L (ref 22–30)
CREAT SERPL-MCNC: 3.26 MG/DL (ref 0.7–1.5)
GFR SERPL CREATININE-BSD FRML MDRD: 17 ML/MIN/1.73SQ M
GLUCOSE P FAST SERPL-MCNC: 91 MG/DL (ref 70–99)
GLUCOSE SERPL-MCNC: 91 MG/DL (ref 70–99)
POTASSIUM SERPL-SCNC: 4.9 MMOL/L (ref 3.6–5)
SODIUM SERPL-SCNC: 137 MMOL/L (ref 137–147)

## 2020-01-12 PROCEDURE — 80048 BASIC METABOLIC PNL TOTAL CA: CPT | Performed by: NURSE PRACTITIONER

## 2020-01-12 PROCEDURE — 99232 SBSQ HOSP IP/OBS MODERATE 35: CPT | Performed by: INTERNAL MEDICINE

## 2020-01-12 RX ADMIN — DORZOLAMIDE HYDROCHLORIDE AND TIMOLOL MALEATE 1 DROP: 20; 5 SOLUTION/ DROPS OPHTHALMIC at 10:12

## 2020-01-12 RX ADMIN — ATORVASTATIN CALCIUM 40 MG: 80 TABLET, FILM COATED ORAL at 17:30

## 2020-01-12 RX ADMIN — WARFARIN SODIUM 3 MG: 3 TABLET ORAL at 17:31

## 2020-01-12 RX ADMIN — METOPROLOL SUCCINATE 25 MG: 25 TABLET, EXTENDED RELEASE ORAL at 09:56

## 2020-01-12 RX ADMIN — SODIUM BICARBONATE 650 MG: 650 TABLET ORAL at 09:59

## 2020-01-12 RX ADMIN — PANTOPRAZOLE SODIUM 40 MG: 40 TABLET, DELAYED RELEASE ORAL at 06:10

## 2020-01-12 RX ADMIN — DORZOLAMIDE HYDROCHLORIDE AND TIMOLOL MALEATE 1 DROP: 20; 5 SOLUTION/ DROPS OPHTHALMIC at 17:31

## 2020-01-12 RX ADMIN — SODIUM BICARBONATE 650 MG: 650 TABLET ORAL at 16:59

## 2020-01-12 RX ADMIN — TAMSULOSIN HYDROCHLORIDE 0.8 MG: 0.4 CAPSULE ORAL at 16:59

## 2020-01-12 RX ADMIN — GABAPENTIN 300 MG: 300 CAPSULE ORAL at 09:56

## 2020-01-12 RX ADMIN — ISOSORBIDE DINITRATE 20 MG: 10 TABLET ORAL at 09:56

## 2020-01-12 NOTE — PROGRESS NOTES
ccConsultation - Nephrology   Cristofer Wiggins [de-identified] y o  male MRN: [id number]  Unit/Bed#: -02 Encounter: [id number]  ASSESSMENT and PLAN:  1  Acute kidney injury POA):  Suspect prerenal in the setting of diuretics along with progression of renal disease  -peak creatinine at 4 2 and current creatinine 3 21-baseline, however has increased to 3 56 yesterday and now 3 58  -hopefully plateaued  -diuretics currently on hold  -fluid restrictions increased to 1 8 L yesterday  -avoid nephrotoxins  -avoid hypotension  -previous urinalysis was essentially bland  -previous CT scan was negative for hydronephrosis  -will repeat bladder scan with PVR  -continue to trend I/O, lab values in volume status   -patient for a m  Lab work     2  Chronic kidney disease IV:  Suspect secondary to hypertensive nephrosclerosis, arterial nephrosclerosis and component of cardiorenal syndrome along with age-related nephron loss in the setting of decreased EF  -baseline creatinine 2 8-3 2  -previously seen as a hospital follow-up for AK I-has appointment to follow Werner Briseno the [facility] [place] office on 02/06/2019 for ongoing Chronic Kidney Disease management  -previously followed nephrologist in [place]     3  Chronic systolic CHF/ischemic cardiomyopathy:  Status post ICD placement for EF of 29%  -cardiology following  -Lasix placed on hold placed on hold Friday secondary to slight worsening of renal function  -will evaluate patient in a m  for possible decreased dose of Lasix to be restarted  -now with trace lower extremity edema ? 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:   -will continue to trend PTH and phosphorus as outpatient  -as above has follow-up with Dr Silveira on 02/06/2019     6  Anemia of Chronic Kidney Disease:   -hemoglobin 10 3 appears stable  -last iron stores on 07/18/2019 revealed iron 69, iron saturation 35, TIBC 198, folate 9 7  -will continue to trend        Disposition:  Per case management note from 1/10/22220 once patient discharged will be moving to [place] with the assistance of his daughter           SUBJECTIVE:  Patient seen and examined  Denies chest pain shortness of breath  Continues with pruritus  Reports constipation  No acute events overnight per RN    RN reports ongoing pruritus and which patient is on as needed Benadryl and hydrocortisone cream   Also on daily stool regiment    OBJECTIVE:  Current Weight: Weight - Scale: 71 4 kg (157 lb 6 5 oz)  Vitals:    01/11/20 0620 01/11/20 1000 01/11/20 1634 01/12/20 0600   BP: 114/58 122/64 103/57    BP Location: Right arm  Right arm    Pulse: 61  65    Resp: 21  20    Temp: 98 8 °F (37 1 °C)  98 4 °F (36 9 °C)    TempSrc: Temporal  Temporal    SpO2: 98%  95%    Weight:    71 4 kg (157 lb 6 5 oz)   Height:           Intake/Output Summary (Last 24 hours) at 1/12/2020 4430  Last data filed at 1/12/2020 6331  Gross per 24 hour   Intake 1080 ml   Output 1100 ml   Net -20 ml     General:  No acute distress, out of bed, cooperative,   Skin:  Warm and dry   ENMT:  Mucous membranes moist, sclera anicteric, tongue is midline  Neck:  Supple without JVD  Respiratory:  Essentially clear on auscultation without crackles, rhonchi, wheezes  Cardiac:  Regular rate and rhythm without rub, or murmur  GI:  Soft, nontender, no distention, active bowel sounds  Extremities:  Trace pedal edema noted bilaterally  Neuro:  Alert oriented and awake, no gross deficits  Psych:  Appropriate affect    Medications:    Current Facility-Administered Medications:   acetaminophen (TYLENOL) tablet 650 mg, 650 mg, Oral, Q6H PRN, KEON CastañedaNP, 650 mg at 01/11/20 0458    atorvastatin (LIPITOR) tablet 40 mg, 40 mg, Oral, After Laurey Anjelica Ward MD, 40 mg at 01/11/20 1721    diphenhydrAMINE (BENADRYL) tablet 25 mg, 25 mg, Oral, Q6H PRN, Luis Levy MD, 25 mg at 01/11/20 1244    dorzolamide-timolol (COSOPT) 22 3-6 8 MG/ML ophthalmic solution 1 drop, 1 drop, Both Eyes, BID, Jackeline Jaramillo MD, 1 drop at 01/11/20 1721    gabapentin (NEURONTIN) capsule 300 mg, 300 mg, Oral, Daily, AUDREY Castañeda, 300 mg at 01/11/20 0959    hydrocortisone 1 % cream, , Topical, 4x Daily PRN, Luis Levy MD    isosorbide dinitrate (ISORDIL) tablet 20 mg, 20 mg, Oral, BID after meals, Jackeline Jaramillo MD, 20 mg at 01/11/20 1721    metoprolol succinate (TOPROL-XL) 24 hr tablet 25 mg, 25 mg, Oral, Daily, Najma Peace DO, 25 mg at 01/11/20 1000    pantoprazole (PROTONIX) EC tablet 40 mg, 40 mg, Oral, Daily, AUDREY Castañeda, 40 mg at 01/12/20 0610    polyethylene glycol (MIRALAX) packet 17 g, 17 g, Oral, Daily PRN, Jackeline Jaramillo MD    polyvinyl alcohol (LIQUIFILM TEARS) 1 4 % ophthalmic solution 1 drop, 1 drop, Both Eyes, Q3H PRN, Jackeline Jaramillo MD    senna-docusate sodium (SENOKOT S) 8 6-50 mg per tablet 1 tablet, 1 tablet, Oral, BID PRN, Jackeline Jaramillo MD, 1 tablet at 01/07/20 2004    sodium bicarbonate tablet 650 mg, 650 mg, Oral, BID after meals, Jackeline Jaramillo MD, 650 mg at 01/11/20 1721    tamsulosin (FLOMAX) capsule 0 8 mg, 0 8 mg, Oral, Daily With Dinner, Krista Souza, KEONNP, 0 8 mg at 01/11/20 1721    tuberculin injection 5 Units, 5 Units, Intradermal, PRN, Simon Noel MD, 5 Units at 01/03/20 1702    warfarin (COUMADIN) tablet 3 mg, 3 mg, Oral, Daily (warfarin), Krista Souza, CRNP, 3 mg at 01/11/20 1721    Laboratory Results:  Results from last 7 days   Lab Units 01/12/20  0443 01/11/20  0455 01/10/20  0512 01/06/20  0428   WBC Thousand/uL  --   --   --  4 10*   HEMOGLOBIN g/dL  --   --   --  10 3*   HEMATOCRIT %  --   --   --  31 0*   PLATELETS Thousands/uL  --   --   --  191   SODIUM mmol/L 137 139 141 137   POTASSIUM mmol/L 4 9 3 9 4 3 4 0   CHLORIDE mmol/L 107 107 107 106   CO2 mmol/L 20* 22 23 18*   BUN mg/dL 70* 70* 70* 65*   CREATININE mg/dL 3 26* 3 58* 3 56* 3 39*   CALCIUM mg/dL 9 2 9 5 9 7 9 6   PHOSPHORUS mg/dL  --   --  4 1  --

## 2020-01-13 LAB
ANION GAP SERPL CALCULATED.3IONS-SCNC: 13 MMOL/L (ref 5–14)
BUN SERPL-MCNC: 65 MG/DL (ref 5–25)
CALCIUM SERPL-MCNC: 9.5 MG/DL (ref 8.4–10.2)
CHLORIDE SERPL-SCNC: 110 MMOL/L (ref 97–108)
CO2 SERPL-SCNC: 17 MMOL/L (ref 22–30)
CREAT SERPL-MCNC: 3.27 MG/DL (ref 0.7–1.5)
ERYTHROCYTE [DISTWIDTH] IN BLOOD BY AUTOMATED COUNT: 15.1 %
GFR SERPL CREATININE-BSD FRML MDRD: 17 ML/MIN/1.73SQ M
GLUCOSE P FAST SERPL-MCNC: 85 MG/DL (ref 70–99)
GLUCOSE SERPL-MCNC: 85 MG/DL (ref 70–99)
HCT VFR BLD AUTO: 32.8 % (ref 41–53)
HGB BLD-MCNC: 10.4 G/DL (ref 13.5–17.5)
INR PPP: 2.69 (ref 0.91–1.09)
MCH RBC QN AUTO: 31.9 PG (ref 26–34)
MCHC RBC AUTO-ENTMCNC: 31.6 G/DL (ref 31–36)
MCV RBC AUTO: 101 FL (ref 80–100)
PLATELET # BLD AUTO: 202 THOUSANDS/UL (ref 150–450)
PMV BLD AUTO: 8.2 FL (ref 8.9–12.7)
POTASSIUM SERPL-SCNC: 4.5 MMOL/L (ref 3.6–5)
PROTHROMBIN TIME: 29 SECONDS (ref 9.8–12)
RBC # BLD AUTO: 3.25 MILLION/UL (ref 4.5–5.9)
SODIUM SERPL-SCNC: 140 MMOL/L (ref 137–147)
WBC # BLD AUTO: 4.1 THOUSAND/UL (ref 4.5–11)

## 2020-01-13 PROCEDURE — 97535 SELF CARE MNGMENT TRAINING: CPT

## 2020-01-13 PROCEDURE — 97530 THERAPEUTIC ACTIVITIES: CPT

## 2020-01-13 PROCEDURE — 80048 BASIC METABOLIC PNL TOTAL CA: CPT | Performed by: FAMILY MEDICINE

## 2020-01-13 PROCEDURE — 97110 THERAPEUTIC EXERCISES: CPT

## 2020-01-13 PROCEDURE — 97116 GAIT TRAINING THERAPY: CPT

## 2020-01-13 PROCEDURE — 85610 PROTHROMBIN TIME: CPT | Performed by: FAMILY MEDICINE

## 2020-01-13 PROCEDURE — 99309 SBSQ NF CARE MODERATE MDM 30: CPT | Performed by: FAMILY MEDICINE

## 2020-01-13 PROCEDURE — 85027 COMPLETE CBC AUTOMATED: CPT | Performed by: FAMILY MEDICINE

## 2020-01-13 PROCEDURE — 99232 SBSQ HOSP IP/OBS MODERATE 35: CPT | Performed by: INTERNAL MEDICINE

## 2020-01-13 RX ORDER — LACTULOSE 20 G/30ML
20 SOLUTION ORAL DAILY PRN
Status: DISCONTINUED | OUTPATIENT
Start: 2020-01-13 | End: 2020-01-17 | Stop reason: HOSPADM

## 2020-01-13 RX ORDER — SODIUM BICARBONATE 650 MG/1
1300 TABLET ORAL
Status: DISCONTINUED | OUTPATIENT
Start: 2020-01-13 | End: 2020-01-17 | Stop reason: HOSPADM

## 2020-01-13 RX ORDER — FUROSEMIDE 20 MG/1
20 TABLET ORAL DAILY
Status: DISCONTINUED | OUTPATIENT
Start: 2020-01-13 | End: 2020-01-16

## 2020-01-13 RX ORDER — BISACODYL 10 MG
10 SUPPOSITORY, RECTAL RECTAL DAILY PRN
Status: DISCONTINUED | OUTPATIENT
Start: 2020-01-13 | End: 2020-01-17 | Stop reason: HOSPADM

## 2020-01-13 RX ADMIN — ISOSORBIDE DINITRATE 20 MG: 10 TABLET ORAL at 08:57

## 2020-01-13 RX ADMIN — TAMSULOSIN HYDROCHLORIDE 0.8 MG: 0.4 CAPSULE ORAL at 17:01

## 2020-01-13 RX ADMIN — FUROSEMIDE 20 MG: 20 TABLET ORAL at 12:02

## 2020-01-13 RX ADMIN — DIPHENHYDRAMINE HCL 25 MG: 25 TABLET ORAL at 06:05

## 2020-01-13 RX ADMIN — DORZOLAMIDE HYDROCHLORIDE AND TIMOLOL MALEATE 1 DROP: 20; 5 SOLUTION/ DROPS OPHTHALMIC at 18:04

## 2020-01-13 RX ADMIN — ISOSORBIDE DINITRATE 20 MG: 10 TABLET ORAL at 17:01

## 2020-01-13 RX ADMIN — GABAPENTIN 300 MG: 300 CAPSULE ORAL at 08:57

## 2020-01-13 RX ADMIN — ATORVASTATIN CALCIUM 40 MG: 80 TABLET, FILM COATED ORAL at 17:01

## 2020-01-13 RX ADMIN — METOPROLOL SUCCINATE 25 MG: 25 TABLET, EXTENDED RELEASE ORAL at 08:57

## 2020-01-13 RX ADMIN — SODIUM BICARBONATE 1300 MG: 650 TABLET ORAL at 17:01

## 2020-01-13 RX ADMIN — WARFARIN SODIUM 3 MG: 3 TABLET ORAL at 17:01

## 2020-01-13 RX ADMIN — PANTOPRAZOLE SODIUM 40 MG: 40 TABLET, DELAYED RELEASE ORAL at 06:04

## 2020-01-13 RX ADMIN — SODIUM BICARBONATE 650 MG: 650 TABLET ORAL at 08:57

## 2020-01-13 NOTE — PLAN OF CARE
Problem: OCCUPATIONAL THERAPY ADULT  Goal: Performs self-care activities at highest level of function for planned discharge setting  See evaluation for individualized goals  Description  Treatment Interventions: ADL retraining, Visual perceptual retraining, Functional transfer training, UE strengthening/ROM, Endurance training, Cognitive reorientation, Patient/family training, Equipment evaluation/education, Neuromuscular reeducation, Fine motor coordination activities, Compensatory technique education, Continued evaluation, Energy conservation, Activityengagement          See flowsheet documentation for full assessment, interventions and recommendations  Outcome: Progressing  Note:   Limitation: Decreased ADL status, Decreased UE ROM, Decreased UE strength, Decreased Safe judgement during ADL, Decreased cognition, Decreased endurance, Decreased sensation, Visual deficit, Decreased fine motor control, Decreased self-care trans, Decreased high-level ADLs  Prognosis: Good, Fair  Nursing obtaining blood at initiation of session  Pt willing to participate in AM session with focus on UB dressing MI, LB dressing MI , bathing MI, don/doff footwear MI, functional mobility and functional transfers   Participated in therapeutic activities to improve activity tolerance, visual scanning/compensatory strategies, standing tolerance, endurance and dynamic/static sitting/standing balance  Pt requires 1 VC and increased time to find 2/5 colored cones within 30 ft-- reports no difficulties at home with navigation  Pt demonstrates improvements towards POC goals in the following areas: self initiating rest breaks, ADL performance  Pt limited by pain, ROM, weakness, fatigue and endurance   Pt educated on safe functional transfer techniques with appropriate body mechanics, fall prevention, activity modification techniques for energy conservation and safe discharge planning/ safety at home    Patient to benefit from continued Occupational Therapy treatment while in the hospital to address deficits as defined above and maximize level of functional independence with ADLs and functional mobility  Pt supine with HOB elevated with call bell in reach to conclude session       OT Discharge Recommendation: Home OT(with additional family support )  OT - OK to Discharge: No

## 2020-01-13 NOTE — PROGRESS NOTES
Progress Note Sharla Menchaca 1940, [de-identified] y o  male MRN: [id number]    Unit/Bed#: -02 Encounter: [id number]    Primary Care Provider: Ananth Ortiz PA-C   Date and time admitted to hospital: 1/2/2020  8:20 PM        * Acute renal failure superimposed on stage 4 chronic kidney disease [facility])  Assessment & Plan  Patient was recently admitted to [facility] secondary to acute kidney injury on CKD stage 4  Baseline creatinine is 2 4-3 2  Peak creatinine on prior admission was 4 2  Continue Lasix 20 mg daily and monitor renal function closely   Avoid nephrotoxin agents  Avoid hypotension  No hydronephrosis noted on imaging and normal postvoid residual volumes noted  Appreciate Nephrology input    Patient is very weak and deconditioned requires physical therapy and occupation therapy for evaluation and to work with him to help improve his ambulatory status  Endurance, stamina and performance of ADL and IADL      PAD (peripheral artery disease) ([facility] [address] )  Assessment & Plan  Patient has known peripheral arterial disease  Continue Coumadin for now  Continue adequate blood pressure control and statin therapy   right lower extremity stent previously placed  Patient has chronic neuropathic pain and continue Neurontin  Also needs physical therapy occupational therapy to help with improving his stamina and endurance and performance of ADL and IADL  Also has history of DVT status post IVC filter    Chronic systolic congestive heart failure [facility])  Assessment & Plan  Wt Readings from Last 3 Encounters:   01/13/20 72 6 kg (160 lb 0 9 oz)   01/02/20 73 6 kg (162 lb 4 1 oz)   09/23/19 77 1 kg (170 lb)     Is currently euvolemic  He has known history of chronic systolic CHF with EF of 82% on last 2D echo done 02/19  Patient's baseline weight is 162 lb -166 lb  Currently he is on Lasix 20 mg daily    His weight is down to 160 lb which is below his baseline weight and his renal function is acceptable at this time   When we increased his Lasix dose to 40 mg daily his renal function got worsened      Hypertensive heart disease with chronic combined systolic and diastolic congestive heart failure [facility])  Assessment & Plan  Wt Readings from Last 3 Encounters:   01/13/20 72 6 kg (160 lb 0 9 oz)   01/02/20 73 6 kg (162 lb 4 1 oz)   09/23/19 77 1 kg (170 lb)     Patient's blood pressures have been low normal range  Secondary to recent elevation creatinine will try to be a little bit more liberal with blood pressure control  Isordil was recently decreased and hydralazine was discontinued  Try to keep systolic blood pressure more than 130  Blood pressure control has improved        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 [facility])  Assessment & Plan  Patient has known dementia with behavioral disturbance  He is currently at his baseline  Needs fall precautions and monitor his behaviors during the evenings for signs of sundowning    Mixed hyperlipidemia  Assessment & Plan  Continue Lipitor    Anemia due to stage 4 chronic kidney disease ([facility] [address] )  Assessment & Plan  Patient's hemoglobin is 10 4  Patient has anemia of chronic disease and renal dysfunction   Stable at this time    BPH (benign prostatic hyperplasia)  Assessment & Plan  Stable on Flomax 0 8 mg daily  Voiding appears to be normal at this time    Paroxysmal atrial fibrillation [facility])  Assessment & Plan  EKG currently shows atrial paced rhythm  Continue metoprolol for rate control and Coumadin for anticoagulation  INR is therapeutic    GERD (gastroesophageal reflux disease)  Assessment & Plan  Stable on Protonix    Slow transit constipation:  Increased bowel regimen to help improve his constipation  Patient is medically cleared to travel to [place] and also cleared to have cataract surgery    Okay to hold Coumadin for up to 1 week for surgery if needed  VTE Pharmacologic Prophylaxis:   Pharmacologic: Warfarin (Coumadin)  Mechanical VTE Prophylaxis in Place: No    Patient Centered Rounds: I have performed bedside rounds with nursing staff today  Discussions with Specialists or Other Care Team Provider:  None    Education and Discussions with Family / Patient:  Discussed with patient at bedside    Time Spent for Care: 30 minutes  More than 50% of total time spent on counseling and coordination of care as described above  Current Length of Stay: 11 day(s)    Current Patient Status: SNF Short Term Inpatient     Discharge Plan:  Pending progress    Code Status: Level 1 - Full Code      Subjective:   Patient denies any chest pain or shortness of breath or abdominal pain  Objective:     Vitals:   Temp (24hrs), Av 8 °F (36 6 °C), Min:97 5 °F (36 4 °C), Max:98 °F (36 7 °C)    Temp:  [97 5 °F (36 4 °C)-98 °F (36 7 °C)] 98 °F (36 7 °C)  HR:  [60-75] 60  Resp:  [18-20] 20  BP: (125-158)/(60-89) 125/60  SpO2:  [91 %-97 %] 95 %  Body mass index is 25 83 kg/m²  Input and Output Summary (last 24 hours): Intake/Output Summary (Last 24 hours) at 2020 1617  Last data filed at 2020 1353  Gross per 24 hour   Intake 1240 ml   Output 450 ml   Net 790 ml       Physical Exam:     Physical Exam   Constitutional: He is oriented to person, place, and time  He appears well-developed and well-nourished  HENT:   Head: Normocephalic and atraumatic  Right Ear: External ear normal    Left Ear: External ear normal    Mouth/Throat: Oropharynx is clear and moist    Neck: Normal range of motion  Neck supple  Cardiovascular: Normal rate, regular rhythm and normal heart sounds  Pulmonary/Chest: Effort normal and breath sounds normal    Abdominal: Soft  Bowel sounds are normal  He exhibits no mass  There is no tenderness  There is no rebound and no guarding  Genitourinary:   Genitourinary Comments: deferred   Musculoskeletal: Normal range of motion  He exhibits no edema  Neurological: He is alert and oriented to person, place, and time   He has normal reflexes  Cranial nerves 2-12 are normal   Normal neurological exam   Skin: Skin is warm and dry  No rash noted  Psychiatric: He has a normal mood and affect  Nursing note and vitals reviewed  Additional Data:     Labs:    Results from last 7 days   Lab Units 01/13/20  0600   WBC Thousand/uL 4 10*   HEMOGLOBIN g/dL 10 4*   HEMATOCRIT % 32 8*   PLATELETS Thousands/uL 202     Results from last 7 days   Lab Units 01/13/20  0600   SODIUM mmol/L 140   POTASSIUM mmol/L 4 5   CHLORIDE mmol/L 110*   CO2 mmol/L 17*   BUN mg/dL 65*   CREATININE mg/dL 3 27*   ANION GAP mmol/L 13   CALCIUM mg/dL 9 5   GLUCOSE RANDOM mg/dL 85     Results from last 7 days   Lab Units 01/13/20  0915   INR  2 69*                       * I Have Reviewed All Lab Data Listed Above  * Additional Pertinent Lab Tests Reviewed:  Adam 65 Admission Reviewed    Imaging:    Imaging Reports Reviewed Today Include:  None  Imaging Personally Reviewed by Myself Includes:  None    Recent Cultures (last 7 days):           Last 24 Hours Medication List:     Current Facility-Administered Medications:  acetaminophen 650 mg Oral Q6H PRN AUDREY Castañeda   atorvastatin 40 mg Oral After Dinner Viridiana Garcia MD   bisacodyl 10 mg Rectal Daily PRN Viridiana Garcia MD   diphenhydrAMINE 25 mg Oral Q6H PRN Michael Angel MD   dorzolamide-timolol 1 drop Both Eyes BID Viridiana Garcia MD   furosemide 20 mg Oral Daily Mely Brice MD   gabapentin 300 mg Oral Daily AUDREY Castañeda   hydrocortisone  Topical 4x Daily PRN Michael Angel MD   isosorbide dinitrate 20 mg Oral BID after meals Viridiana Garcia MD   lactulose 20 g Oral Daily PRN Viridiana Garcia MD   metoprolol succinate 25 mg Oral Daily Najma K Nata, DO   pantoprazole 40 mg Oral Daily AUDREY Castañeda   polyethylene glycol 17 g Oral Daily PRN Viridiana Garcia MD   polyvinyl alcohol 1 drop Both Eyes Q3H PRN Viridiana Garcia MD   senna-docusate sodium 1 tablet Oral BID PRN Jackeline Jaramillo MD   sodium bicarbonate 1,300 mg Oral BID after meals Evelyn Peguero MD   tamsulosin 0 8 mg Oral Daily With Dinner AUDREY Chaudhry   tuberculin 5 Units Intradermal PRN Simon Noel MD   warfarin 3 mg Oral Daily (warfarin) AUDREY Castañeda        Today, Patient Was Seen By: Jackeline Jaramillo MD    ** Please Note: Dictation voice to text software may have been used in the creation of this document   **

## 2020-01-13 NOTE — SOCIAL WORK
[facility] held today with pt, dtr, Chiquita Apo (via phone), and care team in attendance  Therapy reports that pt continues to make progress  Pt has no issues ambulating household distances with Rollator  They continue to trial stairs  At this time pt can do up to 8 steps  Endurance continues to improve  Medication list reviewed with pt and family  Nursing advised nephrology has been closely following and pt's kidney functioning has been improving  Pt remains on fluid restriction at 1800cc/day  Pt noted to be upset that due to restriction, nursing were having a harder time drawing blood  Nursing advised that due to coumadin pt will need close monitoring and INR checks  Family are unable to give specific date for when pt will be moving to [place]  They state sometime next week  Lucille Terrell has been in contact with PCP Dr Roger Terrell to bring pt to PCP for one last check up before getting on the plane  Lucille Terrell also mentioned that pt will also get a eye exam  Family concerned with medical clearance to fly on plane  Lucille Terrell also mentioned that they are in the process of moving to another home  Plan is for pt to discharge to [address] [place] [address]  Pt will be seen by PCP for medical clearance and then will fly to live with Binta Arroyo in [place]  Binta Arroyo will bring pt to get established with PCP and order homecare services if needed  Therapy discussed shipping DME equipment to [place] such as a shower chair  Binta Arroyo confirmed she will f/u with [place] PCP regarding  Plan at this time is for discharge on Friday 1/17  Lucille Terrell to provide transportation at   Medications to be sent to [facility] [address] [place] [address]

## 2020-01-13 NOTE — OCCUPATIONAL THERAPY NOTE
Occupational Therapy Treatment Note  0923-9094 70 min       Leticia Baldwin    Patient Active Problem List   Diagnosis    Periorbital edema    Chronic systolic congestive heart failure (HCC)    Renal disorder    GERD (gastroesophageal reflux disease)    Paroxysmal atrial fibrillation (HCC)    HTN (hypertension)    Acute renal failure superimposed on stage 4 chronic kidney disease (HCC)    CKD (chronic kidney disease), stage IV ([facility] )    Secondary hyperparathyroidism ([facility] )    Hypercalcemia    BPH (benign prostatic hyperplasia)    [facility]peripheral [facility]) ([facility])    Hyperkalemia    Age-related cognitive decline    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)    Chronic kidney disease-mineral and bone disorder    [facility]    Chronic atrial fibrillation    Macular atrophy, retinal    Chest pain    Generalized abdominal pain    MGUS (monoclonal gammopathy of unknown significance)    Pulmonary nodule    Ambulatory dysfunction    Coronary artery disease    Mixed hyperlipidemia    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 ([facility] )    Hypertensive heart disease with chronic combined systolic and diastolic congestive heart failure ([facility])       Past Medical History:   Diagnosis Date    A-fib ([place] [id number] )     CHF (congestive heart failure) (HCC)     CKD (chronic kidney disease) stage 4, GFR 15-29 ml/min (HCC)     GERD (gastroesophageal reflux disease)     Glaucoma     History of transfusion     Hypertension     Renal disorder        TIME: 8895-6690 70 min    VITALS: none taken; stable   PAIN: generalized; R LE L shoulder   COGNITION: impaired, decreased short term memory, decreased recall of safety precautions, requires increased time and repeat cues for cognitive processing  PRECAUTIONS: bed alarm, chair alarm, cognitive, fall risk      EXPIRATION DATE: 1/17/2020  TREATMENT DAY: 9  DISCHARGE RECOMMENDATION: Home OT with family support ** Plan is for d/c home -- family to make plans for transport to [place]  See SW note 1/10 with future home setup  HOME SETUP: Lives in a house with 1 SHERI (high) doorway for support  ? Tub/shower unit + GB, + Shower chair  ? Does NOT take rollator into bathroom  ? DME: [place], GB  ? Lives with daughter  ? Daughter provides CS for community support   ? -   ? Sleeps in reg bed with LE elevated on pillows  ADDITIONAL COMMENTS:     SESSION DETAILS / ASSESSMENT:    Nursing obtaining blood at initiation of session  Pt willing to participate in AM session with focus on UB dressing MI, LB dressing MI , toileting MI--urinal, bathing MI, don/doff footwear MI, functional mobility and functional transfers   Participated in therapeutic activities to improve activity tolerance, visual scanning/compensatory strategies, standing tolerance, endurance and dynamic/static sitting/standing balance  Pt requires 1 VC and increased time to find 2/5 colored cones within 30 ft-- reports no difficulties at home with navigation  Pt demonstrates improvements towards POC goals in the following areas: self initiating rest breaks, ADL performance  Pt limited by pain, ROM, weakness, fatigue and endurance   Pt educated on safe functional transfer techniques with appropriate body mechanics, fall prevention, activity modification techniques for energy conservation and safe discharge planning/ safety at home   Patient to benefit from continued Occupational Therapy treatment while in the hospital to address deficits as defined above and maximize level of functional independence with ADLs and functional mobility  Pt supine with HOB elevated with call bell in reach to conclude session   Damp washcloth provided for comfort       Goals STG achieved within 2 weeks Performance at Initial Evaluation 1/3/2020  Current Performance (last date completed)   Grooming while standing at sink S  MET S seated 1/13 MI   1/9 MI oral care and hand washing ([employer] provided patient with DS for overall safety as he reported fatigued with no LOB however did sit on rollator post completion of task to rest (supervision needed for transfer)      ADL transfers  MI  MET S 1/13 MI   1/10 MI from rollator, low soft armchair, recliner S with SPT when seated on rollator     Bathroom mobility - reports bathroom not accessible with rollator  S  1/9 1/13    CGA 1/3 Supervision + Rollator use  1/8  CS + rollator   1/4 CGA with Rollator   UB ADL  MI  (1/9, 1/11 Min A 1/13  MI   LB ADL, AE PRN S  1/11    Mod A 1/13 MI   1/10 shoes only MI   1/9 MI-- Set up assist along with increased time for completion (limts by pain)   1/4 Mod A   Toileting/clothing management and hygiene S  MET Min A 1/13 urinal in stance S  1/9 MI-- DS safety without issue   1/3 Min A- A with pants from floor   Dynamic standing balance for increased safety when completing purposeful tasks F+  MET P+ 1/13 Fair+  1/8 Fair/Fair- static and light dynamic challenge  1/4 P+   Increase standing tolerance for inc'd safety with standing purposeful tasks 4-7 min  MET continue  2 min 1/13 multiple trials 3 min ambulation -- able to self initiate seated rest breaks   1/9 sinkside and mobility with ADL 4 5 minutes (fatigued post and needed supervision to sit safely)  1/8 3 min average before needing to rest 2* fatigue and right LE leg pain  1/6 2 min average-- easily fatigued  1/4 2 min    Pt will demonstrate appropriate visual compensatory strategies with ADL tasks/therapuetic activities  Ind / 0 VC's   1/10 1/11 1/13   1/13 visual scanning for multi colored cones seated/with ambulation increased time 1 VC  1/11 (+) visual scanning with ADL   1/10 with ambulation- able to utilize appropriate visual scanning techs   1/8 at this time patient with increased swelling to eyes limiting tolerance to activity = itching   Patient provided with cool washcloth for comfort     1/6 reports use of sunglasses post recommendations of use at home     Participate in therex 1-3x/week for inc'd overall stamina/activity tolerance for purposeful tasks To be completed   MET- continue   1/13 with adl   1/9  right UE 2# hand weight shoulder flex, chest press, biceps and horizontal abduction bilaterally 2x10, PROM left shoulder as tolerated with ROM 65*     1/6 2# hand weight completing 2x5 chest press, x10 side/side, x10 circles, x10 biceps  1/3 L shoulder 3-/5    Participate in further cognitive testing to assist with safe d/c planning To be completed  MET    1/10 MOCA Blind d/t significant visual impairments 18/22 1/6  A&Ox4   Pleasant and cooperative   Able to state how he manages tub at home which appears to be accurate     1/3 A&O x4 -- increased time required for processing    Tub/shower (+) GBs and shower chair  S    Unable to assess 1/10 simulated as described this date and documented 1/6; CGA with txfr -- dtr provides A at home   1/6 Overall min assist provided for overall safety as patient very fatigued this date along with reports of eyes hurting   Daughter always assists  Leidy Medina states set up as follows-- sits on toilet and gets left leg into tub, stands with daughter assist PRN and reaches for grab bars to lift right leg over 16" tub wall and sits on shower chair   Patient then reverses to get out of tub      Kitchen mobility/light meal prep for inc'd independence and safety negotiating kitchen environment  S  (1/8) Unable to assess *1/8 Super  vision beverage only -- see assessment above for details and recommendations    1/6 CS + Rollator simulated environment   Bed mobility with HOB flat  MI  1/10 1/13 Min A 1/13 Sit<>sup MI   1/9 Min A sit<>sup, LE management          Lauro Zamora, MS, OTR/L

## 2020-01-13 NOTE — PROGRESS NOTES
NEPHROLOGY PROGRESS NOTE   Tong Telles [de-identified] y o  male MRN: [id number]  Unit/Bed#: -02 Encounter: [id number]  Reason for Consult: SHAHIDA/CKD    ASSESSMENT AND PLAN:  SHAHIDA on CKD stage 4, baseline creatinine 2 8 to 3 2, follows with Dr Martin Rascon  -creatinine has overall improved and seems to have plateaued at 3 2 today  -SHAHIDA suspected to be secondary to cardiorenal/diuretic use also concern for some progression of CKD as well  -CKD suspected to be secondary to underlying hypertension/cardiorenal syndrome/age-related nephron loss  -currently diuretics remain on hold  -urine output not very accurate as also some incontinence issues/unmeasured samples  -avoid hypotension, nephrotoxins or NSAIDs  -BMP in a m   -recent CT scan shows no hydronephrosis    CKD anemia, hemoglobin overall stable, continue to monitor    Low bicarb, presumed metabolic acidosis in the setting of advanced renal failure  -increase sodium bicarb to two tablet p o  B i d   -continue to monitor    Chronic systolic CHF/cardiomyopathy, EF 29%, status post ICD placed  -remains on Lasix 40 mg b i d  At home as outpatient  His diuresis are currently on hold  Was recently tried lower dose 40 mg daily with increase in creatinine   -currently not in any respiratory distress although weight seems to be slowly increasing   -may have to accept higher creatinine to keep him euvolemic  -will restart lower dose Lasix 20 mg daily and daily reassess in adjustment for diuretics  Daily standing weight   -patient will need maintenance diuretics    Discussed above plan with primary team     SUBJECTIVE:  Patient seen and examined at bedside  No chest pain, shortness of breath, nausea, vomiting, abdominal pain  Denies urinary complaint      OBJECTIVE:  Current Weight: Weight - Scale: 72 6 kg (160 lb 0 9 oz)  Vitals:    01/13/20 0636   BP: 158/89   Pulse: 75   Resp: 18   Temp: 97 5 °F (36 4 °C)   SpO2: 91%       Intake/Output Summary (Last 24 hours) at 1/13/2020 5401  Last data filed at 1/13/2020 0718  Gross per 24 hour   Intake 640 ml   Output 650 ml   Net -10 ml     Wt Readings from Last 3 Encounters:   01/13/20 72 6 kg (160 lb 0 9 oz)   01/02/20 73 6 kg (162 lb 4 1 oz)   09/23/19 77 1 kg (170 lb)     Temp Readings from Last 3 Encounters:   01/13/20 97 5 °F (36 4 °C) (Temporal)   01/02/20 (!) 96 3 °F (35 7 °C) (Tympanic)   07/21/19 (!) 97 °F (36 1 °C) (Tympanic)     BP Readings from Last 3 Encounters:   01/13/20 158/89   01/02/20 152/93   09/23/19 97/52     Pulse Readings from Last 3 Encounters:   01/13/20 75   01/02/20 67   09/23/19 77        Physical Examination:  General:  Sitting in chair, no acute distress   Eyes:  Mild conjunctival pallor present, sclerae anicteric  ENT:  External examination of ears and nose unremarkable  Neck:  No obvious lymphadenopathy appreciated  Respiratory:  Bilateral air entry present  CVS:  S1, S2 present  GI:  Soft, nontender, nondistended  CNS:  Active alert oriented x3  Extremities:  No significant edema in legs  Skin:  No new rash in legs    Medications:    Current Facility-Administered Medications:     acetaminophen (TYLENOL) tablet 650 mg, 650 mg, Oral, Q6H PRN, KEON CastañedaNP, 650 mg at 01/11/20 0458    atorvastatin (LIPITOR) tablet 40 mg, 40 mg, Oral, After Judah Ward MD, 40 mg at 01/12/20 1730    diphenhydrAMINE (BENADRYL) tablet 25 mg, 25 mg, Oral, Q6H PRN, Navi Lagunas MD, 25 mg at 01/13/20 0605    dorzolamide-timolol (COSOPT) 22 3-6 8 MG/ML ophthalmic solution 1 drop, 1 drop, Both Eyes, BID, Doreen Webster MD, 1 drop at 01/12/20 1731    gabapentin (NEURONTIN) capsule 300 mg, 300 mg, Oral, Daily, AUDREY Castañeda, 300 mg at 01/13/20 0857    hydrocortisone 1 % cream, , Topical, 4x Daily PRN, Navi Lagunas MD    isosorbide dinitrate (ISORDIL) tablet 20 mg, 20 mg, Oral, BID after meals, Doreen Webster MD, 20 mg at 01/13/20 0857    metoprolol succinate (TOPROL-XL) 24 hr tablet 25 mg, 25 mg, Oral, Daily, Najma Peace DO, 25 mg at 01/13/20 0857    pantoprazole (PROTONIX) EC tablet 40 mg, 40 mg, Oral, Daily, AUDREY Castañeda, 40 mg at 01/13/20 0604    polyethylene glycol (MIRALAX) packet 17 g, 17 g, Oral, Daily PRN, Shanita Keita MD    polyvinyl alcohol (LIQUIFILM TEARS) 1 4 % ophthalmic solution 1 drop, 1 drop, Both Eyes, Q3H PRN, Shanita Keita MD    senna-docusate sodium (SENOKOT S) 8 6-50 mg per tablet 1 tablet, 1 tablet, Oral, BID PRN, Shanita Keita MD, 1 tablet at 01/07/20 2004    sodium bicarbonate tablet 650 mg, 650 mg, Oral, BID after meals, Shanita Keita MD, 650 mg at 01/13/20 0857    tamsulosin (FLOMAX) capsule 0 8 mg, 0 8 mg, Oral, Daily With Dinner, AUDREY Castañeda, 0 8 mg at 01/12/20 1659    tuberculin injection 5 Units, 5 Units, Intradermal, PRN, Parris Walsh MD, 5 Units at 01/03/20 1702    warfarin (COUMADIN) tablet 3 mg, 3 mg, Oral, Daily (warfarin), AUDREY Castañeda, 3 mg at 01/12/20 1731    Laboratory Results:  Results from last 7 days   Lab Units 01/13/20  0600 01/12/20  0443 01/11/20  0455 01/10/20  0512   WBC Thousand/uL 4 10*  --   --   --    HEMOGLOBIN g/dL 10 4*  --   --   --    HEMATOCRIT % 32 8*  --   --   --    PLATELETS Thousands/uL 202  --   --   --    SODIUM mmol/L 140 137 139 141   POTASSIUM mmol/L 4 5 4 9 3 9 4 3   CHLORIDE mmol/L 110* 107 107 107   CO2 mmol/L 17* 20* 22 23   BUN mg/dL 65* 70* 70* 70*   CREATININE mg/dL 3 27* 3 26* 3 58* 3 56*   CALCIUM mg/dL 9 5 9 2 9 5 9 7   PHOSPHORUS mg/dL  --   --   --  4 1       No orders to display       Portions of the record may have been created with voice recognition software  Occasional wrong word or "sound a like" substitutions may have occurred due to the inherent limitations of voice recognition software  Read the chart carefully and recognize, using context, where substitutions have occurred

## 2020-01-14 LAB
ANION GAP SERPL CALCULATED.3IONS-SCNC: 11 MMOL/L (ref 5–14)
BUN SERPL-MCNC: 59 MG/DL (ref 5–25)
CALCIUM SERPL-MCNC: 9.4 MG/DL (ref 8.4–10.2)
CHLORIDE SERPL-SCNC: 107 MMOL/L (ref 97–108)
CO2 SERPL-SCNC: 23 MMOL/L (ref 22–30)
CREAT SERPL-MCNC: 3.04 MG/DL (ref 0.7–1.5)
GFR SERPL CREATININE-BSD FRML MDRD: 18 ML/MIN/1.73SQ M
GLUCOSE SERPL-MCNC: 104 MG/DL (ref 70–99)
POTASSIUM SERPL-SCNC: 4.5 MMOL/L (ref 3.6–5)
SODIUM SERPL-SCNC: 141 MMOL/L (ref 137–147)

## 2020-01-14 PROCEDURE — 97110 THERAPEUTIC EXERCISES: CPT

## 2020-01-14 PROCEDURE — 80048 BASIC METABOLIC PNL TOTAL CA: CPT | Performed by: INTERNAL MEDICINE

## 2020-01-14 PROCEDURE — 97530 THERAPEUTIC ACTIVITIES: CPT

## 2020-01-14 PROCEDURE — 97535 SELF CARE MNGMENT TRAINING: CPT

## 2020-01-14 PROCEDURE — 99232 SBSQ HOSP IP/OBS MODERATE 35: CPT | Performed by: INTERNAL MEDICINE

## 2020-01-14 PROCEDURE — 97116 GAIT TRAINING THERAPY: CPT

## 2020-01-14 RX ADMIN — FUROSEMIDE 20 MG: 20 TABLET ORAL at 08:45

## 2020-01-14 RX ADMIN — TAMSULOSIN HYDROCHLORIDE 0.8 MG: 0.4 CAPSULE ORAL at 18:18

## 2020-01-14 RX ADMIN — METOPROLOL SUCCINATE 25 MG: 25 TABLET, EXTENDED RELEASE ORAL at 08:45

## 2020-01-14 RX ADMIN — ATORVASTATIN CALCIUM 40 MG: 80 TABLET, FILM COATED ORAL at 18:15

## 2020-01-14 RX ADMIN — SODIUM BICARBONATE 1300 MG: 650 TABLET ORAL at 08:45

## 2020-01-14 RX ADMIN — SODIUM BICARBONATE 1300 MG: 650 TABLET ORAL at 18:18

## 2020-01-14 RX ADMIN — DORZOLAMIDE HYDROCHLORIDE AND TIMOLOL MALEATE 1 DROP: 20; 5 SOLUTION/ DROPS OPHTHALMIC at 08:45

## 2020-01-14 RX ADMIN — ISOSORBIDE DINITRATE 20 MG: 10 TABLET ORAL at 08:45

## 2020-01-14 RX ADMIN — DIPHENHYDRAMINE HCL 25 MG: 25 TABLET ORAL at 22:41

## 2020-01-14 RX ADMIN — PANTOPRAZOLE SODIUM 40 MG: 40 TABLET, DELAYED RELEASE ORAL at 06:03

## 2020-01-14 RX ADMIN — GABAPENTIN 300 MG: 300 CAPSULE ORAL at 08:45

## 2020-01-14 RX ADMIN — ISOSORBIDE DINITRATE 20 MG: 10 TABLET ORAL at 18:16

## 2020-01-14 RX ADMIN — DORZOLAMIDE HYDROCHLORIDE AND TIMOLOL MALEATE 1 DROP: 20; 5 SOLUTION/ DROPS OPHTHALMIC at 18:16

## 2020-01-14 RX ADMIN — WARFARIN SODIUM 3 MG: 3 TABLET ORAL at 18:19

## 2020-01-14 NOTE — PLAN OF CARE
Problem: OCCUPATIONAL THERAPY ADULT  Goal: Performs self-care activities at highest level of function for planned discharge setting  See evaluation for individualized goals  Description  Treatment Interventions: ADL retraining, Visual perceptual retraining, Functional transfer training, UE strengthening/ROM, Endurance training, Cognitive reorientation, Patient/family training, Equipment evaluation/education, Neuromuscular reeducation, Fine motor coordination activities, Compensatory technique education, Continued evaluation, Energy conservation, Activityengagement          See flowsheet documentation for full assessment, interventions and recommendations  Outcome: Progressing  Note:   Limitation: Decreased ADL status, Decreased UE ROM, Decreased UE strength, Decreased Safe judgement during ADL, Decreased cognition, Decreased endurance, Decreased sensation, Visual deficit, Decreased fine motor control, Decreased self-care trans, Decreased high-level ADLs  Prognosis: Good, Fair  Assessment: Pt reports increased fatigue, stating "I just want to get into bed"   OT provided education on importance of participation to achieve POC goals, willing to participate with focus on don/doff footwear , bed mobility, functional mobility, functional transfers and tub transfer   Discussed at length home setup related to tub transfer  Pt able to demonstrate prior technique and continues to report A from dtr  CGA required for balance  Participated in therapeutic activities to utilize appropriate visual scanning techniques and improve activity tolerance, standing tolerance, endurance and dynamic/static sitting/standing balance    Pt demonstrates improvements towards POC goals in the following areas: visual scanning techs with functional mobility  Pt limited by pain, ROM, weakness, fatigue, endurance and static/dynamic standing balance     Pt educated on safe functional transfer techniques with appropriate body mechanics, fall prevention,, the appropriate use of AE/DME to improve functional performance and activity modification techniques for energy conservation  Patient to benefit from continued Occupational Therapy treatment while in the hospital to address deficits as defined above and maximize level of functional independence with ADLs and functional mobility  Pt supine with HOB elevated call bell in reach to conclude session  Cold wash cloth applied to eyes for comfort        OT Discharge Recommendation: Home OT(with additional family support )  OT - OK to Discharge: No

## 2020-01-14 NOTE — PROGRESS NOTES
NEPHROLOGY PROGRESS NOTE   Savage Lai [de-identified] y o  male MRN: [id number]  Unit/Bed#: -02 Encounter: [id number]  Reason for Consult: SHAHIDA/CKD    ASSESSMENT AND PLAN:  SHAHIDA on CKD stage 4, baseline creatinine 2 8 to 3 2, follows with Dr Abhijit Villegas  -creatinine has overall improved and seems to have plateaued at 3 2 yesterday  No labs available today due to hard stick  -SHAHIDA suspected to be secondary to cardiorenal/diuretic use also concern for some progression of CKD as well  -CKD suspected to be secondary to underlying hypertension/cardiorenal syndrome/age-related nephron loss  -diuretics as below  -urine output not very accurate as also some incontinence issues/unmeasured samples  -avoid hypotension, nephrotoxins or NSAIDs  -BMP in a m   -recent CT scan shows no hydronephrosis     CKD anemia, hemoglobin overall stable, continue to monitor     Low bicarb, presumed metabolic acidosis in the setting of advanced renal failure  -increased sodium bicarb to two tablet p o  B i d , BMP results to follow tomorrow   -continue to monitor     Chronic systolic CHF/cardiomyopathy, EF 29%, status post ICD placed  -remains on Lasix 40 mg b i d  At home as outpatient  -currently not in any respiratory distress although weight seems to be slowly increasing   -may have to accept higher creatinine to keep him euvolemic  -restarted lower dose Lasix 20 mg daily and daily reassess in adjustment for diuretics  Daily standing weight   -patient will need maintenance diuretics     Discussed above plan with primary team     SUBJECTIVE:  Patient seen and examined at bedside  No chest pain, shortness of breath, nausea, vomiting, abdominal pain or diarrhea  No urinary complaints       OBJECTIVE:  Current Weight: Weight - Scale: 73 kg (160 lb 15 oz)  Vitals:    01/14/20 0711   BP: 131/65   Pulse: 60   Resp: 22   Temp: 98 7 °F (37 1 °C)   SpO2: 98%       Intake/Output Summary (Last 24 hours) at 1/14/2020 1056  Last data filed at 1/14/2020 6901  Gross per 24 hour   Intake 720 ml   Output 425 ml   Net 295 ml     Wt Readings from Last 3 Encounters:   01/14/20 73 kg (160 lb 15 oz)   01/02/20 73 6 kg (162 lb 4 1 oz)   09/23/19 77 1 kg (170 lb)     Temp Readings from Last 3 Encounters:   01/14/20 98 7 °F (37 1 °C) (Temporal)   01/02/20 (!) 96 3 °F (35 7 °C) (Tympanic)   07/21/19 (!) 97 °F (36 1 °C) (Tympanic)     BP Readings from Last 3 Encounters:   01/14/20 131/65   01/02/20 152/93   09/23/19 97/52     Pulse Readings from Last 3 Encounters:   01/14/20 60   01/02/20 67   09/23/19 77        Physical Examination:  General:  Sitting in chair, no acute distress   Eyes:  Mild conjunctival pallor present, sclerae anicteric  ENT:  External examination of ears and nose unremarkable  Neck:  No obvious lymphadenopathy appreciated  Respiratory:  Bilateral air entry present  CVS:  S1, S2 present  GI: , nontender, nondistended  CNS:  Active alert oriented x3  Extremities:  No significant edema in legs  Skin:  No new rash in legs    Medications:    Current Facility-Administered Medications:     acetaminophen (TYLENOL) tablet 650 mg, 650 mg, Oral, Q6H PRN, AUDREY Castañeda, 650 mg at 01/11/20 0458    atorvastatin (LIPITOR) tablet 40 mg, 40 mg, Oral, After Lukasz Ward MD, 40 mg at 01/13/20 1701    bisacodyl (DULCOLAX) rectal suppository 10 mg, 10 mg, Rectal, Daily PRN, Antonino Fontenot MD    diphenhydrAMINE (BENADRYL) tablet 25 mg, 25 mg, Oral, Q6H PRN, Jerri Manuel MD, 25 mg at 01/13/20 0605    dorzolamide-timolol (COSOPT) 22 3-6 8 MG/ML ophthalmic solution 1 drop, 1 drop, Both Eyes, BID, Antonino Fontenot MD, 1 drop at 01/14/20 0845    furosemide (LASIX) tablet 20 mg, 20 mg, Oral, Daily, Delta Colon MD, 20 mg at 01/14/20 0845    gabapentin (NEURONTIN) capsule 300 mg, 300 mg, Oral, Daily, AUDREY Castañeda, 300 mg at 01/14/20 0845    hydrocortisone 1 % cream, , Topical, 4x Daily PRN, Jerri Manuel MD    isosorbide dinitrate (ISORDIL) tablet 20 mg, 20 mg, Oral, BID after meals, Raghu Olea MD, 20 mg at 01/14/20 0845    lactulose 20 g/30 mL oral solution 20 g, 20 g, Oral, Daily PRN, Raghu Olea MD    metoprolol succinate (TOPROL-XL) 24 hr tablet 25 mg, 25 mg, Oral, Daily, Najma Peace, DO, 25 mg at 01/14/20 0845    pantoprazole (PROTONIX) EC tablet 40 mg, 40 mg, Oral, Daily, AUDREY Castañeda, 40 mg at 01/14/20 0603    polyethylene glycol (MIRALAX) packet 17 g, 17 g, Oral, Daily PRN, Raghu Olea MD    polyvinyl alcohol (LIQUIFILM TEARS) 1 4 % ophthalmic solution 1 drop, 1 drop, Both Eyes, Q3H PRN, Raghu Olea MD    senna-docusate sodium (SENOKOT S) 8 6-50 mg per tablet 1 tablet, 1 tablet, Oral, BID PRN, Raghu Olea MD, 1 tablet at 01/07/20 2004    sodium bicarbonate tablet 1,300 mg, 1,300 mg, Oral, BID after meals, Latha Santoyo MD, 1,300 mg at 01/14/20 0845    tamsulosin (FLOMAX) capsule 0 8 mg, 0 8 mg, Oral, Daily With Dinner, AUDREY Castañeda, 0 8 mg at 01/13/20 1701    tuberculin injection 5 Units, 5 Units, Intradermal, PRN, Dominique Rubin MD, 5 Units at 01/03/20 1702    warfarin (COUMADIN) tablet 3 mg, 3 mg, Oral, Daily (warfarin), KEON CastañedaNP, 3 mg at 01/13/20 1701    Laboratory Results:  Results from last 7 days   Lab Units 01/13/20  0600 01/12/20  0443 01/11/20  0455 01/10/20  0512   WBC Thousand/uL 4 10*  --   --   --    HEMOGLOBIN g/dL 10 4*  --   --   --    HEMATOCRIT % 32 8*  --   --   --    PLATELETS Thousands/uL 202  --   --   --    SODIUM mmol/L 140 137 139 141   POTASSIUM mmol/L 4 5 4 9 3 9 4 3   CHLORIDE mmol/L 110* 107 107 107   CO2 mmol/L 17* 20* 22 23   BUN mg/dL 65* 70* 70* 70*   CREATININE mg/dL 3 27* 3 26* 3 58* 3 56*   CALCIUM mg/dL 9 5 9 2 9 5 9 7   PHOSPHORUS mg/dL  --   --   --  4 1       No orders to display       Portions of the record may have been created with voice recognition software   Occasional wrong word or "sound a like" substitutions may have occurred due to the inherent limitations of voice recognition software  Read the chart carefully and recognize, using context, where substitutions have occurred

## 2020-01-14 NOTE — OCCUPATIONAL THERAPY NOTE
Occupational Therapy Treatment Note  [id number] 98 min      Leticia Baldwin    Patient Active Problem List   Diagnosis    Periorbital edema    Chronic systolic congestive heart failure (HCC)    Renal disorder    GERD (gastroesophageal reflux disease)    Paroxysmal atrial fibrillation (HCC)    HTN (hypertension)    Acute renal failure superimposed on stage 4 chronic kidney disease (HCC)    CKD (chronic kidney disease), stage IV ([facility] )    Secondary [facility]parathyroidism ([facility] )    Hypercalcemia    BPH (benign prostatic hyperplasia)    [facility]ipheral [facility]) [facility]    Hyperkalemia    Age-related cognitive decline    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)    Chronic kidney disease-mineral and bone disorder    [facility]    Chronic atrial fibrillation    Macular atrophy, retinal    Chest pain    Generalized abdominal pain    MGUS (monoclonal gammopathy of unknown significance)    Pulmonary nodule    Ambulatory dysfunction    Coronary artery disease    Mixed hyperlipidemia    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 [facility]    Hypertensive heart disease with chronic combined systolic and diastolic congestive heart failure ([facility])       Past Medical History:   Diagnosis Date    A-fib ([place] [address] )     CHF (congestive heart failure) (HCC)     CKD (chronic kidney disease) stage 4, GFR 15-29 ml/min (HCC)     GERD (gastroesophageal reflux disease)     Glaucoma     History of transfusion     Hypertension     Renal disorder        TIME: 2945-7148 41 min   VITALS: 93% RA; 142/68   PAIN: generalized; R LE L shoulder  COGNITION: impaired, decreased short term memory, decreased recall of safety precautions, requires increased time and repeat cues for cognitive processing  PRECAUTIONS: bed alarm, chair alarm, cognitive, fall risk    EXPIRATION DATE: 1/17/2020  TREATMENT DAY: 10  DISCHARGE RECOMMENDATION: Home OT with family support ** Plan is for d/c home -- family to make plans for transport to [place]  See SW note 1/10 with future home setup  HOME SETUP: Lives in a house with 1 SHERI (high) doorway for support  ? Tub/shower unit + GB, + Shower chair  ? Does NOT take rollator into bathroom  ? DME: Shivani Vasquez, GB  ? Lives with daughter  ? Daughter provides CS for community support   ? -   ? Sleeps in reg bed with LE elevated on pillows  ADDITIONAL COMMENTS:     SESSION DETAILS / ASSESSMENT:    Pt reports increased fatigue, stating "I just want to get into bed"   OT provided education on importance of participation to achieve POC goals, willing to participate with focus on don/doff footwear , bed mobility, functional mobility, functional transfers and tub transfer   Discussed at length home setup related to tub transfer  Pt able to demonstrate prior technique and continues to report A from dtr  CGA required for balance  Participated in therapeutic activities to utilize appropriate visual scanning techniques and improve activity tolerance, standing tolerance, endurance and dynamic/static sitting/standing balance    Pt demonstrates improvements towards POC goals in the following areas: visual scanning techs with functional mobility  Pt limited by pain, ROM, weakness, fatigue, endurance and static/dynamic standing balance    Pt educated on safe functional transfer techniques with appropriate body mechanics, fall prevention,, the appropriate use of AE/DME to improve functional performance and activity modification techniques for energy conservation  Patient to benefit from continued Occupational Therapy treatment while in the hospital to address deficits as defined above and maximize level of functional independence with ADLs and functional mobility  Pt supine with HOB elevated call bell in reach to conclude session  Cold wash cloth applied to eyes for comfort         Goals STG achieved within 2 weeks Performance at Initial Evaluation 1/3/2020  Current Performance (last date completed)   Grooming while standing at sink S  MET S seated 1/13 MI   1/9 MI oral care and hand washing ([employer] provided patient with DS for overall safety as he reported fatigued with no LOB however did sit on rollator post completion of task to rest (supervision needed for transfer)   ADL transfers  MI  MET S 1/14 MI   1/10 MI from rollator, low soft armchair, recliner S with SPT when seated on rollator     Bathroom mobility - reports bathroom not accessible with rollator  S  1/9 1/13 1/14    CGA 1/14 Supervision + Rollator use  1/8  CS + rollator   1/4 CGA with Rollator   UB ADL  MI  (1/9, 1/11 Min A 1/13  MI   LB ADL, AE PRN S  1/11    Mod A 1/13 MI   1/10 shoes only MI   1/9 MI-- Set up assist along with increased time for completion (limts by pain)   1/4 Mod A   Toileting/clothing management and hygiene S  MET Min A 1/14 MI in stance with urinal only   1/13 urinal in stance S  1/9 MI-- DS safety without issue   1/3 Min A- A with pants from floor   Dynamic standing balance for increased safety when completing purposeful tasks F+  MET P+ 1/14 Fair+  1/8 Fair/Fair- static and light dynamic challenge  1/4 P+   Increase standing tolerance for inc'd safety with standing purposeful tasks 4-7 min  MET continue  2 min 1/14 multiple trials 3 min ambulation -- able to self initiate seated rest breaks   1/9 sinkside and mobility with ADL 4 5 minutes (fatigued post and needed supervision to sit safely)  1/8 3 min average before needing to rest 2* fatigue and right LE leg pain  1/6 2 min average-- easily fatigued  1/4 2 min    Pt will demonstrate appropriate visual compensatory strategies with ADL tasks/therapuetic activities    Ind / 0 VC's   1/10 1/11 1/13 1/14   1/14 visual scanning 0 VC's   1/13 visual scanning for multi colored cones seated/with ambulation increased time 1 VC  1/11 (+) visual scanning with ADL   1/10 with ambulation- able to utilize appropriate visual scanning techs   1/8 at this time patient with increased swelling to eyes limiting tolerance to activity = itching   Patient provided with cool washcloth for comfort     1/6 reports use of sunglasses post recommendations of use at home  Participate in therex 1-3x/week for inc'd overall stamina/activity tolerance for purposeful tasks To be completed   MET- continue   1/13 with adl   1/9  right UE 2# hand weight shoulder flex, chest press, biceps and horizontal abduction bilaterally 2x10, PROM left shoulder as tolerated with ROM 65*     1/6 2# hand weight completing 2x5 chest press, x10 side/side, x10 circles, x10 biceps  1/3 L shoulder 3-/5    Participate in further cognitive testing to assist with safe d/c planning To be completed  MET    1/10 MOCA Blind d/t significant visual impairments 18/22 1/6  A&Ox4   Pleasant and cooperative   Able to state how he manages tub at home which appears to be accurate     1/3 A&O x4 -- increased time required for processing    Tub/shower (+) GBs and shower chair  S    Unable to assess 1/14 c/S with VC's for safety   1/10 simulated as described this date and documented 1/6; CGA with txfr -- dtr provides A at home   1/6 Overall min assist provided for overall safety as patient very fatigued this date along with reports of eyes hurting   Daughter always assists  Brien Null states set up as follows-- sits on toilet and gets left leg into tub, stands with daughter assist PRN and reaches for grab bars to lift right leg over 16" tub wall and sits on shower chair   Patient then reverses to get out of tub      Kitchen mobility/light meal prep for inc'd independence and safety negotiating kitchen environment  S  (1/8) Unable to assess *1/8 Supervision beverage only -- see assessment above for details and recommendations    1/6 CS + Rollator simulated environment   Bed mobility with HOB flat  MI  1/10 1/13 1/14 Min A 1/14 Sit<>sup MI   1/9 Min A sit<>sup, LE management        Lauro Zamora, MS, OTR/L

## 2020-01-14 NOTE — PLAN OF CARE
Problem: PHYSICAL THERAPY ADULT  Goal: Performs mobility at highest level of function for planned discharge setting  See evaluation for individualized goals  Description  Treatment/Interventions: ADL retraining, Functional transfer training, LE strengthening/ROM, Elevations, Therapeutic exercise, Endurance training, Cognitive reorientation, Patient/family training, Equipment eval/education, Bed mobility, Gait training, Compensatory technique education, Continued evaluation, Spoke to MD, Spoke to nursing, Spoke to case management, Spoke to advanced practitioner, OT, Family  Equipment Recommended: (TBD)       See flowsheet documentation for full assessment, interventions and recommendations  Outcome: Progressing  Note:   Prognosis: Good  Problem List: Decreased strength, Decreased endurance, Decreased range of motion, Impaired balance, Decreased mobility, Decreased coordination, Pain, Impaired hearing, Impaired vision  Assessment: Pt seen today for PT treatment session consisting of LE exercise gait training and patient care conference  Pts dtr present for conference and other dtr called in, they have arranged for pt to return to local [facility] home for short time at D/C  He will see his PCP to be cleared to fly so he can transition to his [facility] home who lives in [facility], this home has 8+8 SHERI  If he is going to be staying for extended time he will need home therapies  During todays session pt was reporting increased fatigue found supine in bed asleep at start of session, he reported sleeping poorly and needing to have multiple attempts at drawing blood this morning  He did participate well dispite this completing all LE strengthening exercises  His dtr also provided pts AFO which will be used from this time forth for gait and other mobility training  At end of session pt left seated in bedside recliner with all needs in reach and all questions answered, dtr at bedside    Barriers to Discharge: Inaccessible home environment, Decreased caregiver support     Recommendation: Home with family support, Home PT     PT - OK to Discharge: Yes    See flowsheet documentation for full assessment

## 2020-01-14 NOTE — PLAN OF CARE
Problem: PHYSICAL THERAPY ADULT  Goal: Performs mobility at highest level of function for planned discharge setting  See evaluation for individualized goals  Description  Treatment/Interventions: ADL retraining, Functional transfer training, LE strengthening/ROM, Elevations, Therapeutic exercise, Endurance training, Cognitive reorientation, Patient/family training, Equipment eval/education, Bed mobility, Gait training, Compensatory technique education, Continued evaluation, Spoke to MD, Spoke to nursing, Spoke to case management, Spoke to advanced practitioner, OT, Family  Equipment Recommended: (TBD)       See flowsheet documentation for full assessment, interventions and recommendations  1/14/2020 0956 by Honey Montoya, PT  Outcome: Progressing  Note:   Prognosis: Good  Problem List: Decreased strength, Decreased range of motion, Decreased endurance, Impaired balance, Decreased mobility, Pain, Impaired vision, Impaired hearing, Impaired sensation  Assessment: Pt seen today for a PT treatment session, found seated in recliner alert and agreeable to therapy  He continues to progress well in therapy tolerating all previous LE strengthening plus squats today  PT also able to tolerate increased ambulation distance today with improved gait pattern 2* to increasing endurance and use of AFO this session  Recommend use of shoes and AFO for mobility  He performed well on stair trial today completing a FF with min A to CGA no LOB, he did report fatigue after trial, seated rest break at the top of stairs before descending  At end of session he was left seated in bedside recliner with all needs in reach and all questions answered  Barriers to Discharge: Inaccessible home environment, Decreased caregiver support     Recommendation: Home with family support, Home PT     PT - OK to Discharge: Yes    See flowsheet documentation for full assessment       1/14/2020 0751 by Honey Montoya, PT  Outcome: Progressing  Note: Prognosis: Good  Problem List: Decreased strength, Decreased endurance, Decreased range of motion, Impaired balance, Decreased mobility, Decreased coordination, Pain, Impaired hearing, Impaired vision  Assessment: Pt seen today for PT treatment session consisting of LE exercise gait training and patient care conference  Pts dtr present for conference and other dtr called in, they have arranged for pt to return to local [facility] home for short time at D/C  He will see his PCP to be cleared to fly so he can transition to his [facility] home who lives in [facility], this home has 8+8 SHERI  If he is going to be staying for extended time he will need home therapies  During todays session pt was reporting increased fatigue found supine in bed asleep at start of session, he reported sleeping poorly and needing to have multiple attempts at drawing blood this morning  He did participate well dispite this completing all LE strengthening exercises  His dtr also provided pts AFO which will be used from this time forth for gait and other mobility training  At end of session pt left seated in bedside recliner with all needs in reach and all questions answered, dtr at bedside  Barriers to Discharge: Inaccessible home environment, Decreased caregiver support     Recommendation: Home with family support, Home PT     PT - OK to Discharge: Yes    See flowsheet documentation for full assessment

## 2020-01-14 NOTE — PHYSICAL THERAPY NOTE
Physical Therapy Daily Treatment Note    Patient's Name: Savage Lai    Admitting Diagnosis  Renal failure [N19]    Problem List  Patient Active Problem List   Diagnosis    Periorbital edema    Chronic systolic congestive heart failure (HCC)    Renal disorder    GERD (gastroesophageal reflux disease)    Paroxysmal atrial fibrillation (HCC)    HTN (hypertension)    Acute renal failure superimposed on stage 4 chronic kidney disease (HCC)    CKD [facility] IV [facility]    Secondary hyperparathyroidism ([facility] )    Hypercalcemia    BPH (benign prostatic hyperplasia)    [facility]) [facility]    Hyperkalemia    Age-related cognitive decline    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)    Chronic kidney disease-mineral and bone disorder    [facility]    Chronic atrial fibrillation    Macular atrophy, retinal    Chest pain    Generalized abdominal pain    MGUS (monoclonal gammopathy of unknown significance)    Pulmonary nodule    Ambulatory dysfunction    Coronary artery disease    Mixed hyperlipidemia    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 ([facility]    Hypertensive heart disease with chronic combined systolic and diastolic congestive heart failure (HCC)       Past Medical History  Past Medical History:   Diagnosis Date    [facility]fib ([facility]     CHF (congestive heart failure) (HCC)     CKD (chronic kidney disease) stage 4, GFR 15-29 ml/min (HCC)     GERD (gastroesophageal reflux disease)     Glaucoma     History of transfusion     Hypertension     Renal disorder        Past Surgical History  Past Surgical History:   Procedure Laterality Date    ABDOMINAL SURGERY      CARDIAC PACEMAKER PLACEMENT      EYE SURGERY      HERNIA REPAIR      Doesn't recall, pt states he may have      VASCULAR SURGERY         Vitals:    01/13/20 1534 01/14/20 0200 01/14/20 0600 01/14/20 0711   BP: 125/60   131/65   BP Location: Left arm Right arm   Pulse: 60   60   Resp: 20   22   Temp: 98 °F (36 7 °C)   98 7 °F (37 1 °C)   TempSrc: Temporal   Temporal   SpO2: 95% 95%  98%   Weight:   73 kg (160 lb 15 oz)    Height:         Total PT Treatment Time: 55 minutes     01/14/20 0849   Pain Assessment   Pain Assessment 0-10   Pain Score 4   Pain Type Chronic pain   Pain Location Leg; Foot   Pain Orientation Right   Restrictions/Precautions   Weight Bearing Precautions Per Order No   Other Precautions Hard of hearing;Visual impairment;Pain; Fall Risk   General   Chart Reviewed Yes   Response to Previous Treatment Patient with no complaints from previous session  Family/Caregiver Present No   Cognition   Arousal/Participation Alert; Cooperative   Attention Within functional limits   Memory Decreased recall of recent events;Decreased recall of precautions   Following Commands Follows one step commands without difficulty   Subjective   Subjective Reporting fatigue after longer walks and stair trials    Transfers   Sit to Stand 5  Supervision   Additional items Increased time required;Armrests   Stand to Sit 5  Supervision   Additional items Increased time required;Armrests   Additional Comments Rollator used for transfers   Ambulation/Elevation   Gait pattern Decreased foot clearance; Excessively slow;Narrow ERICA   Gait Assistance 5  Supervision   Additional items Verbal cues   Assistive Device 4-wheeled walker   Stair Management Assistance 4  Minimal assist   Additional items Assist x 1;Verbal cues; Tactile cues; Increased time required   Stair Management Technique Two rails; Step to pattern; Foreward  (lead with L)   Number of Stairs 12   Balance   Static Sitting Good   Dynamic Sitting Good   Static Standing Fair +   Dynamic Standing Fair +   Ambulatory Fair +   Endurance Deficit   Endurance Deficit Yes   Endurance Deficit Description fatigues with longer distance ambulation   Activity Tolerance   Activity Tolerance Patient tolerated treatment well   Exercises Heelslides Sitting;15 reps;AROM; Bilateral  (Blue tb x2)   Hip Flexion Sitting;15 reps;AROM  (4lbs)   Hip Abduction Sitting;15 reps;AROM  (blue tb x2)   Hip Adduction Sitting;15 reps  (manual resistance x2)   Knee AROM Long Arc Quad Sitting;15 reps;AROM  (4lbs )   Squat Standing;10 reps;AROM  (CGA)   Assessment   Prognosis Good   Problem List Decreased strength;Decreased range of motion;Decreased endurance; Impaired balance;Decreased mobility;Pain; Impaired vision; Impaired hearing; Impaired sensation   Assessment Pt seen today for a PT treatment session, found seated in recliner alert and agreeable to therapy  He continues to progress well in therapy tolerating all previous LE strengthening plus squats today  PT also able to tolerate increased ambulation distance today with improved gait pattern 2* to increasing endurance and use of AFO this session  Recommend use of shoes and AFO for mobility  He performed well on stair trial today completing a FF with min A to CGA no LOB, he did report fatigue after trial, seated rest break at the top of stairs before descending  At end of session he was left seated in bedside recliner with all needs in reach and all questions answered  Goals   Patient Goals complete FF stairs   STG Expiration Date 01/17/19   Short Term Goal #1 grid   PT Treatment Day 10   Plan   Treatment/Interventions Functional transfer training;LE strengthening/ROM; Elevations; Therapeutic exercise; Endurance training;Patient/family training;Equipment eval/education; Bed mobility;Gait training   Progress Progressing toward goals   PT Frequency   (5-7x/wk)   Recommendation   Recommendation Home with family support;Home PT   Equipment Recommended   (Rollator)        Goals STG achieved within  2 weeks Performance at Initial Evaluation (01/03/20) Current Performance (last date completed)   Supine to sit transitions to increase ease of transfer, allow pt to get out of bed, and decrease caregiver burden    MOD I     PROGRESSING Supervision   - HOB flat  - No rail 01/08/20   Sit to supine transitions to increase ease of transfer, allow pt to get back into bed, and decrease caregiver burden MOD I  PROGRESSING Supervision   - HOB flat  - No rail 01/8/20   Functional transfers to facilitate safe return to previous living environment     MOD I  PROGRESSING Sit <->stand tranfers: CG A     SPT with Rollator: CG A 01/11/20   Ambulation with rollator for 50 feet   ( for safe household distance ambulation)     MOD I  PROGRESSING Ambulate with rollator 17 feet with CG A 01/11/20   Ambulation with rollator for > or = 300 feet ( to initiate short community distance ambulation, Supervised by daughter)     Mod I  PROGRESSING Unable to ambulate > 17 feet with rollator ; due to very low blood pressure 01/11/20       Ascend/descend a curb step, using appropriate AD and method, no handrails, for safe access to previous living environment Mod I  PROGRESSING Deferred today -> due to very low BP 1/7/20      Ascend/descend 8+8 stairs to enter [facility] home upon moving there post d/c           Supervision          Shane Dawn, PT

## 2020-01-14 NOTE — PHYSICAL THERAPY NOTE
Physical Therapy Daily Treatment Note    Patient's Name: Virgie Kussmaul    Admitting Diagnosis  Renal failure [N19]    Problem List  Patient Active Problem List   Diagnosis    Periorbital edema    Chronic systolic congestive heart failure (HCC)    Renal disorder    GERD (gastroesophageal reflux disease)    Paroxysmal atrial fibrillation (HCC)    HTN (hypertension)    Acute renal failure superimposed on stage 4 chronic kidney disease (HCC)    CKD (chronic kidney disease), stage IV ([facility]    Secondary hyperparathyroidism ([facility]    Hypercalcemia    BPH (benign prostatic hyperplasia)    [facility]peripheral artery [facility]) [facility]    Hyperkalemia    Age-related cognitive decline    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)    Chronic kidney disease-mineral and bone disorder    Dementia with psychosis ([facility]    Chronic atrial fibrillation    Macular atrophy, retinal    Chest pain    Generalized abdominal pain    MGUS (monoclonal gammopathy of unknown significance)    Pulmonary nodule    Ambulatory dysfunction    Coronary artery disease    Mixed hyperlipidemia    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    Hypertensive heart disease with chronic combined systolic and diastolic congestive heart failure (HCC)       Past Medical History  Past Medical History:   Diagnosis Date    A-fib [facility]     CHF (congestive heart failure) (HCC)     CKD (chronic kidney disease) stage 4, GFR 15-29 ml/min (HCC)     GERD (gastroesophageal reflux disease)     Glaucoma     History of transfusion     Hypertension     Renal disorder        Past Surgical History  Past Surgical History:   Procedure Laterality Date    ABDOMINAL SURGERY      CARDIAC PACEMAKER PLACEMENT      EYE SURGERY      HERNIA REPAIR      Doesn't recall, pt states he may have      VASCULAR SURGERY         Vitals:    01/13/20 1534 01/14/20 0200 01/14/20 0600 01/14/20 0711   BP: 125/60   131/65   BP Location: Left arm Right arm   Pulse: 60   60   Resp: 20   22   Temp: 98 °F (36 7 °C)   98 7 °F (37 1 °C)   TempSrc: Temporal   Temporal   SpO2: 95% 95%  98%   Weight:   73 kg (160 lb 15 oz)    Height:         Total PT Treatment Time: 75 minutes     01/13/20 1145   Pain Assessment   Pain Assessment 0-10   Pain Score No Pain   Restrictions/Precautions   Weight Bearing Precautions Per Order No   Other Precautions Hard of hearing;Visual impairment;Pain; Fall Risk   General   Chart Reviewed Yes   Response to Previous Treatment Patient with no complaints from previous session  Family/Caregiver Present No   Cognition   Overall Cognitive Status Impaired   Arousal/Participation Alert; Cooperative   Attention Within functional limits   Memory Decreased short term memory;Decreased recall of precautions   Following Commands Follows one step commands without difficulty   Subjective   Subjective Im feeling very tired today   Bed Mobility   Supine to Sit 6  Modified independent   Additional items Bedrails; Increased time required;LE management;HOB elevated   Transfers   Sit to Stand 5  Supervision   Additional items Increased time required;Armrests   Stand to Sit 5  Supervision   Additional items Increased time required;Armrests   Additional Comments Rollator for transfers   Ambulation/Elevation   Gait pattern Decreased foot clearance; Short stride; Excessively slow; Steppage  (steppage on R)   Gait Assistance 5  Supervision   Additional items Verbal cues   Assistive Device   (Rollator)   Distance 65ft x2   Stair Management Assistance Not tested   Balance   Static Sitting Good   Dynamic Sitting Good   Static Standing Fair +   Dynamic Standing Fair +   Ambulatory Fair +   Endurance Deficit   Endurance Deficit Yes   Endurance Deficit Description fatigues LE during activity   Activity Tolerance   Activity Tolerance Patient tolerated treatment well   Medical Staff Made Aware CM   Exercises   Heelslides Sitting;Bilateral;15 reps  (x2)   Hip Flexion Sitting;Bilateral;15 reps  (x2)   Hip Abduction Sitting;AROM;15 reps  (x2)   Hip Adduction Sitting;AROM;15 reps  (x2)   Knee AROM Long Arc Quad Sitting;15 reps;AROM  (x2)   Ankle Pumps Sitting;20 reps;AROM  (x2)   Assessment   Prognosis Good   Problem List Decreased strength;Decreased endurance;Decreased range of motion; Impaired balance;Decreased mobility; Decreased coordination;Pain; Impaired hearing; Impaired vision   Assessment Pt seen today for PT treatment session consisting of LE exercise gait training and patient care conference  Pts dtr present for conference and other dtr called in, they have arranged for pt to return to local [facility] home for short time at D/C  He will see his PCP to be cleared to fly so he can transition to his [facility] home who lives in [place], this home has 8+8 SHERI  If he is going to be staying for extended time he will need home therapies  During todays session pt was reporting increased fatigue found supine in bed asleep at start of session, he reported sleeping poorly and needing to have multiple attempts at drawing blood this morning  He did participate well dispite this completing all LE strengthening exercises  His dtr also provided pts AFO which will be used from this time forth for gait and other mobility training  At end of session pt left seated in bedside recliner with all needs in reach and all questions answered, dtr at bedside  Goals   Patient Goals be able to fly to [facility] in [place]    STG Expiration Date 01/17/20   Short Term Goal #1 grid   PT Treatment Day 9   Plan   Treatment/Interventions Functional transfer training;LE strengthening/ROM; Elevations; Endurance training; Therapeutic exercise;Patient/family training;Equipment eval/education; Bed mobility;Gait training;Spoke to nursing;Spoke to case management;OT   Progress Progressing toward goals   PT Frequency   (5-7x/wk)   Recommendation   Recommendation Home with family support;Home PT   Equipment Recommended   (Rollator)    Goals STG achieved within  2 weeks Performance at Initial Evaluation (01/03/20) Current Performance (last date completed)   Supine to sit transitions to increase ease of transfer, allow pt to get out of bed, and decrease caregiver burden    MOD I     PROGRESSING Supervision   - HOB flat  - No rail 01/08/20   Sit to supine transitions to increase ease of transfer, allow pt to get back into bed, and decrease caregiver burden MOD I  PROGRESSING Supervision   - HOB flat  - No rail 01/8/20   Functional transfers to facilitate safe return to previous living environment     MOD I  PROGRESSING Sit <->stand tranfers: CG A     SPT with Rollator: CG A 01/11/20   Ambulation with rollator for 50 feet   ( for safe household distance ambulation)     MOD I  PROGRESSING Ambulate with rollator 17 feet with CG A 01/11/20   Ambulation with rollator for > or = 300 feet ( to initiate short community distance ambulation, Supervised by daughter)     Mod I  PROGRESSING Unable to ambulate > 17 feet with rollator ; due to very low blood pressure 01/11/20       Ascend/descend a curb step, using appropriate AD and method, no handrails, for safe access to previous living environment Mod I  PROGRESSING Deferred today -> due to very low BP 1/7/20      Ascend/descend 8+8 stairs to enter dtrs home upon moving there post d/c         Supervision         Yordy Vaughan, PT

## 2020-01-15 LAB
ANION GAP SERPL CALCULATED.3IONS-SCNC: 13 MMOL/L (ref 5–14)
BUN SERPL-MCNC: 55 MG/DL (ref 5–25)
CALCIUM SERPL-MCNC: 9.2 MG/DL (ref 8.4–10.2)
CHLORIDE SERPL-SCNC: 107 MMOL/L (ref 97–108)
CO2 SERPL-SCNC: 19 MMOL/L (ref 22–30)
CREAT SERPL-MCNC: 2.86 MG/DL (ref 0.7–1.5)
GFR SERPL CREATININE-BSD FRML MDRD: 20 ML/MIN/1.73SQ M
GLUCOSE SERPL-MCNC: 110 MG/DL (ref 70–99)
POTASSIUM SERPL-SCNC: 4.3 MMOL/L (ref 3.6–5)
SODIUM SERPL-SCNC: 139 MMOL/L (ref 137–147)

## 2020-01-15 PROCEDURE — 97110 THERAPEUTIC EXERCISES: CPT

## 2020-01-15 PROCEDURE — 99232 SBSQ HOSP IP/OBS MODERATE 35: CPT | Performed by: INTERNAL MEDICINE

## 2020-01-15 PROCEDURE — 97535 SELF CARE MNGMENT TRAINING: CPT

## 2020-01-15 PROCEDURE — 80048 BASIC METABOLIC PNL TOTAL CA: CPT | Performed by: INTERNAL MEDICINE

## 2020-01-15 PROCEDURE — 97116 GAIT TRAINING THERAPY: CPT

## 2020-01-15 PROCEDURE — 97530 THERAPEUTIC ACTIVITIES: CPT

## 2020-01-15 RX ADMIN — ISOSORBIDE DINITRATE 20 MG: 10 TABLET ORAL at 16:49

## 2020-01-15 RX ADMIN — FUROSEMIDE 20 MG: 20 TABLET ORAL at 08:29

## 2020-01-15 RX ADMIN — DORZOLAMIDE HYDROCHLORIDE AND TIMOLOL MALEATE 1 DROP: 20; 5 SOLUTION/ DROPS OPHTHALMIC at 17:58

## 2020-01-15 RX ADMIN — ATORVASTATIN CALCIUM 40 MG: 80 TABLET, FILM COATED ORAL at 17:58

## 2020-01-15 RX ADMIN — GABAPENTIN 300 MG: 300 CAPSULE ORAL at 08:29

## 2020-01-15 RX ADMIN — ISOSORBIDE DINITRATE 20 MG: 10 TABLET ORAL at 08:29

## 2020-01-15 RX ADMIN — WARFARIN SODIUM 3 MG: 3 TABLET ORAL at 17:58

## 2020-01-15 RX ADMIN — SODIUM BICARBONATE 1300 MG: 650 TABLET ORAL at 08:29

## 2020-01-15 RX ADMIN — TAMSULOSIN HYDROCHLORIDE 0.8 MG: 0.4 CAPSULE ORAL at 16:49

## 2020-01-15 RX ADMIN — PANTOPRAZOLE SODIUM 40 MG: 40 TABLET, DELAYED RELEASE ORAL at 06:03

## 2020-01-15 RX ADMIN — METOPROLOL SUCCINATE 25 MG: 25 TABLET, EXTENDED RELEASE ORAL at 08:29

## 2020-01-15 RX ADMIN — DORZOLAMIDE HYDROCHLORIDE AND TIMOLOL MALEATE 1 DROP: 20; 5 SOLUTION/ DROPS OPHTHALMIC at 08:29

## 2020-01-15 RX ADMIN — POLYETHYLENE GLYCOL 3350 17 G: 17 POWDER, FOR SOLUTION ORAL at 12:39

## 2020-01-15 RX ADMIN — SODIUM BICARBONATE 1300 MG: 650 TABLET ORAL at 16:49

## 2020-01-15 NOTE — PLAN OF CARE
Problem: Nutrition/Hydration-ADULT  Goal: Nutrient/Hydration intake appropriate for improving, restoring or maintaining nutritional needs  Description  Monitor and assess patient's nutrition/hydration status for malnutrition  Collaborate with interdisciplinary team and initiate plan and interventions as ordered  Monitor patient's weight and dietary intake as ordered or per policy  Utilize nutrition screening tool and intervene as necessary  Determine patient's food preferences and provide high-protein, high-caloric foods as appropriate  INTERVENTIONS:  - Monitor oral intake, urinary output, labs, and treatment plans  - Assess nutrition and hydration status and recommend course of action  - Evaluate amount of meals eaten  - Assist patient with eating if necessary   - Allow adequate time for meals  - Recommend/ encourage appropriate diets, oral nutritional supplements, and vitamin/mineral supplements  - Order, calculate, and assess calorie counts as needed  - Recommend, monitor, and adjust tube feedings and TPN/PPN based on assessed needs  - Assess need for intravenous fluids  - Provide specific nutrition/hydration education as appropriate  - Include patient/family/caregiver in decisions related to nutrition  Outcome: Progressing     Problem: Potential for Falls  Goal: Patient will remain free of falls  Description  INTERVENTIONS:  - Assess patient frequently for physical needs  -  Identify cognitive and physical deficits and behaviors that affect risk of falls    -  [place] fall precautions as indicated by assessment   - Educate patient/family on patient safety including physical limitations  - Instruct patient to call for assistance with activity based on assessment  - Modify environment to reduce risk of injury  - Consider OT/PT consult to assist with strengthening/mobility  Outcome: Progressing     Problem: PAIN - ADULT  Goal: Verbalizes/displays adequate comfort level or baseline comfort level  Description  Interventions:  - Encourage patient to monitor pain and request assistance  - Assess pain using appropriate pain scale  - Administer analgesics based on type and severity of pain and evaluate response  - Implement non-pharmacological measures as appropriate and evaluate response  - Consider cultural and social influences on pain and pain management  - Notify physician/advanced practitioner if interventions unsuccessful or patient reports new pain  Outcome: Progressing     Problem: INFECTION - ADULT  Goal: Absence or prevention of progression during hospitalization  Description  INTERVENTIONS:  - Assess and monitor for signs and symptoms of infection  - Monitor lab/diagnostic results  - Monitor all insertion sites, i e  indwelling lines, tubes, and drains  - Monitor endotracheal if appropriate and nasal secretions for changes in amount and color  - [place] appropriate cooling/warming therapies per order  - Administer medications as ordered  - Instruct and encourage patient and family to use good hand hygiene technique  - Identify and instruct in appropriate isolation precautions for identified infection/condition  Outcome: Progressing  Goal: Absence of fever/infection during neutropenic period  Description  INTERVENTIONS:  - Monitor WBC    Outcome: Progressing     Problem: SAFETY ADULT  Goal: Maintain or return to baseline ADL function  Description  INTERVENTIONS:  -  Assess patient's ability to carry out ADLs; assess patient's baseline for ADL function and identify physical deficits which impact ability to perform ADLs (bathing, care of mouth/teeth, toileting, grooming, dressing, etc )  - Assess/evaluate cause of self-care deficits   - Assess range of motion  - Assess patient's mobility; develop plan if impaired  - Assess patient's need for assistive devices and provide as appropriate  - Encourage maximum independence but intervene and supervise when necessary  - Involve family in performance of ADLs  - Assess for home care needs following discharge   - Consider OT consult to assist with ADL evaluation and planning for discharge  - Provide patient education as appropriate  Outcome: Progressing  Goal: Maintain or return mobility status to optimal level  Description  INTERVENTIONS:  - Assess patient's baseline mobility status (ambulation, transfers, stairs, etc )    - Identify cognitive and physical deficits and behaviors that affect mobility  - Identify mobility aids required to assist with transfers and/or ambulation (gait belt, sit-to-stand, lift, walker, cane, etc )  - [place] fall precautions as indicated by assessment  - Record patient progress and toleration of activity level on Mobility SBAR; progress patient to next Phase/Stage  - Instruct patient to call for assistance with activity based on assessment  - Consider rehabilitation consult to assist with strengthening/weightbearing, etc   Outcome: Progressing     Problem: DISCHARGE PLANNING  Goal: Discharge to home or other facility with appropriate resources  Description  INTERVENTIONS:  - Identify barriers to discharge w/patient and caregiver  - Arrange for needed discharge resources and transportation as appropriate  - Identify discharge learning needs (meds, wound care, etc )  - Arrange for interpretive services to assist at discharge as needed  - Refer to Case Management Department for coordinating discharge planning if the patient needs post-hospital services based on physician/advanced practitioner order or complex needs related to functional status, cognitive ability, or social support system  Outcome: Progressing     Problem: Prexisting or High Potential for Compromised Skin Integrity  Goal: Skin integrity is maintained or improved  Description  INTERVENTIONS:  - Identify patients at risk for skin breakdown  - Assess and monitor skin integrity  - Assess and monitor nutrition and hydration status  - Monitor labs   - Assess for incontinence - Turn and reposition patient  - Assist with mobility/ambulation  - Relieve pressure over bony prominences  - Avoid friction and shearing  - Provide appropriate hygiene as needed including keeping skin clean and dry  - Evaluate need for skin moisturizer/barrier cream  - Collaborate with interdisciplinary team   - Patient/family teaching  - Consider wound care consult   Outcome: Progressing

## 2020-01-15 NOTE — PHYSICAL THERAPY NOTE
Physical Therapy Treatment Note     01/15/20 1150   Pain Assessment   Pain Assessment 0-10   Pain Score 7   Pain Type Chronic pain   Pain Location Leg; Shoulder   Pain Orientation Bilateral;Left   Restrictions/Precautions   Weight Bearing Precautions Per Order No   Other Precautions Hard of hearing;Visual impairment; Fall Risk;Pain  (R last paresis)   General   Chart Reviewed Yes   Family/Caregiver Present No   Cognition   Overall Cognitive Status WFL   Subjective   Subjective Pt  seated on recliner upon entry  Agreeable to PT  Transfers   Sit to Stand 5  Supervision   Additional items Armrests; Increased time required   Stand to Sit 5  Supervision   Additional items Armrests; Increased time required   Stand pivot 5  Supervision   Additional items Increased time required   Ambulation/Elevation   Gait pattern Decreased foot clearance; Improper Weight shift;R Hemiparesis; Excessively slow; Inconsistent clyde   Gait Assistance 5  Supervision   Additional items Verbal cues   Assistive Device 4-wheeled walker   Distance 150ft x 2   Stair Management Assistance 4  Minimal assist   Additional items Assist x 1;Verbal cues; Increased time required   Stair Management Technique Two rails; Step to pattern; Foreward;Nonreciprocal   Number of Stairs 16   Balance   Static Sitting Good   Ambulatory Fair +   Endurance Deficit   Endurance Deficit Yes   Endurance Deficit Description Fatigue and gross deconditioing   Activity Tolerance   Activity Tolerance Patient tolerated treatment well;Patient limited by fatigue   Nurse Made Aware yes   Exercises   Hip Flexion Standing;AROM; Bilateral  (x30 with 2 lbs)   Knee AROM Long Arc Quad Sitting;Bilateral;AROM  (with 2lbs x 30)   TKR Bilateral;AROM;Standing  (x30 with 2 lbs)   Neuro re-ed SCI fot for 10 min with BLEs and RUE   Assessment   Prognosis Good   Problem List Decreased strength;Decreased endurance; Impaired balance;Decreased mobility; Decreased coordination;Pain; Impaired tone   Assessment Pt  reported pain and noted to fatigue with exertion  pt  noted with no LOB t/o session  Unsafe practices occ during transfers as patient reaches back only with 1UE  Pt  fatigued with stair negotiation  Sangeeta Ok continue per POC with focus on improving strength, balance and endurance  Pt  needed assit in donning shoes   Barriers to Discharge Decreased caregiver support; Inaccessible home environment   Goals   Patient Goals None reported   STG Expiration Date 01/17/20   PT Treatment Day 11   Plan   Treatment/Interventions Functional transfer training;LE strengthening/ROM; Elevations; Therapeutic exercise;Patient/family training;Equipment eval/education;Gait training;Spoke to nursing   Progress Progressing toward goals   PT Frequency Other (Comment)  (5-7x/week)   Recommendation   Recommendation Home with family support;Home PT   Equipment Recommended Other (Comment)  (rollator)   PT - OK to Discharge Yes     Goals STG achieved within  2 weeks Performance at Initial Evaluation (01/03/20) Current Performance (last date completed)   Supine to sit transitions to increase ease of transfer, allow pt to get out of bed, and decrease caregiver burden    MOD I     PROGRESSING Supervision   - HOB flat  - No rail 01/08/20   Sit to supine transitions to increase ease of transfer, allow pt to get back into bed, and decrease caregiver burden MOD I  PROGRESSING Supervision   - HOB flat  - No rail 01/8/20   Functional transfers to facilitate safe return to previous living environment     MOD I  PROGRESSING Sit <->stand tranfers: CG A     SPT with Rollator: CG A 01/15/20   Ambulation with rollator for 50 feet   ( for safe household distance ambulation)     MOD I  PROGRESSING Ambulate with rollator 17 feet with CG A 01/15/20   Ambulation with rollator for > or = 300 feet ( to initiate short community distance ambulation, Supervised by daughter)     Mod I  PROGRESSING Unable to ambulate > 17 feet with rollator ; due to very low blood pressure 01/15/20       Ascend/descend a curb step, using appropriate AD and method, no handrails, for safe access to previous living environment Mod I  PROGRESSING Deferred today -> due to very low BP 1/7/20      Ascend/descend 8+8 stairs to enter dtrs home upon moving there post d/c           Supervision    01/15/20              Soniya Diggs, PTA

## 2020-01-15 NOTE — OCCUPATIONAL THERAPY NOTE
OCCUPATIONAL THERAPY TREATMENT AND DISCHARGE SUMMARY    TIME: 9533-4115 55 min   VITALS: none taken; stable   PAIN: Generalized   COGNITION: impaired, decreased short term memory, decreased recall of safety precautions, requires increased time and repeat cues for cognitive processing  PRECAUTIONS: bed alarm, chair alarm, cognitive, fall risk    EXPIRATION DATE:  1/17/2020  TREATMENT DAY: 11    ASSESSMENT:    Pt pleasant and cooperative during AM ADL with focus on  eating (Ind ), oral hygiene (Ind ), toileting (Refused) , bathing (Setup), UB dressing (Ind ), LB dressing (Ind ), and don/doff footwear ( socks only declined sneakers) (Ind ) -- increased time required for bathing and LB dressing  At length, discussed progression in OT while on TCF and achievement of POC goals  Pt limited by visual deficits, pain, ROM, weakness, fatigue and activity tolerance    Pt educated on  safe functional transfer techniques with appropriate body mechanics, fall prevention, and safe discharge planning/ safety at home   Pt ready for d/c when medically able  Pt seated in recliner with call bell in reach to conclude session  DISPOSITION: Home OT with family support     AE/DME: [employer] (owns)       DISCHARGE SUMMARY: Pt discharged from OT caseload on 1/15/2020 with anticipated d/c home with family support 1/17/2020  Pt made significant gains in OT since initial evaluation  Participated in a total of 11/11 OT sessions  Pt achieved 13/14 goals as per POC  Required CGA<>c/S with tub txfr -- reports dtr previously, and will continue to provide A with txfr  Hindered by visual deficits, pain, ROM, weakness, fatigue and activity tolerance   Pt will have family support  Recommend home therapy focus on the above noted deficits and further achieve independence in ADL related tasks in home environment  Pt is safe to return home with family support  No further concerns noted       Goals STG achieved within 2 weeks Performance at Initial Evaluation 1/3/2020  Current Performance (last date completed)   Grooming while standing at sink S  MET S seated 1/15 MI   1/9 MI oral care and hand washing ([employer] provided patient with DS for overall safety as he reported fatigued with no LOB however did sit on rollator post completion of task to rest (supervision needed for transfer)   ADL transfers  MI  MET S 1/15 MI   1/10 MI from rollator, low soft armchair, recliner S with SPT when seated on rollator     Bathroom mobility - reports bathroom not accessible with rollator  S  MET    CGA 1/15 Supervision + Rollator use  1/8  CS + rollator   1/4 CGA with Rollator   UB ADL  MI  MET Min A 1/15  MI   LB ADL, AE PRN S  MET    Mod A 1/15 MI increased time   1/10 shoes only MI   1/9 MI-- Set up assist along with increased time for completion (limts by pain)   1/4 Mod A   Toileting/clothing management and hygiene S  MET Min A 1/14 MI in stance with urinal only   1/13 urinal in stance S  1/9 MI-- DS safety without issue   1/3 Min A- A with pants from floor   Dynamic standing balance for increased safety when completing purposeful tasks F+  MET P+ 1/15 Fair+  1/8 Fair/Fair- static and light dynamic challenge  1/4 P+   Increase standing tolerance for inc'd safety with standing purposeful tasks 4-7 min  MET  2 min 1/15 4 min standing with ADL   1/14 multiple trials 3 min ambulation -- able to self initiate seated rest breaks   1/9 sinkside and mobility with ADL 4 5 minutes (fatigued post and needed supervision to sit safely)  1/8 3 min average before needing to rest 2* fatigue and right LE leg pain  1/6 2 min average-- easily fatigued  1/4 2 min    Pt will demonstrate appropriate visual compensatory strategies with ADL tasks/therapuetic activities    Ind / 0 VC's   MET   1/14 visual scanning 0 VC's   1/13 visual scanning for multi colored cones seated/with ambulation increased time 1 VC  1/11 (+) visual scanning with ADL   1/10 with ambulation- able to utilize appropriate visual scanning techs   1/8 at this time patient with increased swelling to eyes limiting tolerance to activity = itching   Patient provided with cool washcloth for comfort     1/6 reports use of sunglasses post recommendations of use at home  Participate in therex 1-3x/week for inc'd overall stamina/activity tolerance for purposeful tasks To be completed   MET- continue   1/13 with adl   1/9  right UE 2# hand weight shoulder flex, chest press, biceps and horizontal abduction bilaterally 2x10, PROM left shoulder as tolerated with ROM 65*     1/6 2# hand weight completing 2x5 chest press, x10 side/side, x10 circles, x10 biceps  1/3 L shoulder 3-/5    Participate in further cognitive testing to assist with safe d/c planning To be completed  MET    1/10 MOCA Blind d/t significant visual impairments 18/22 1/6  A&Ox4   Pleasant and cooperative   Able to state how he manages tub at home which appears to be accurate     1/3 A&O x4 -- increased time required for processing    Tub/shower (+) GBs and shower chair  S  NOT MET    Unable to assess 1/14 c/S with VC's for safety   1/10 simulated as described this date and documented 1/6; CGA with txfr -- dtr provides A at home   1/6 Overall min assist provided for overall safety as patient very fatigued this date along with reports of eyes hurting   Daughter always assists  Anton Clemens states set up as follows-- sits on toilet and gets left leg into tub, stands with daughter assist PRN and reaches for grab bars to lift right leg over 16" tub wall and sits on shower chair   Patient then reverses to get out of tub      Kitchen mobility/light meal prep for inc'd independence and safety negotiating kitchen environment  S  MET Unable to assess *1/8 Supervision beverage only -- see assessment above for details and recommendations    1/6 CS + Rollator simulated environment   Bed mobility with HOB flat  MI  MET Min A 1/14 Sit<>sup MI   1/9 Min A sit<>sup, 2011 [facility] Chavez, OTR/L

## 2020-01-15 NOTE — SOCIAL WORK
SW left VM for both daughters to discuss home VNA pending travel date to [place] and [facility]  SW will continue to follow-up  Transfer letter prepared in discharge folder, copy placed in scan bin

## 2020-01-15 NOTE — PLAN OF CARE
Problem: PHYSICAL THERAPY ADULT  Goal: Performs mobility at highest level of function for planned discharge setting  See evaluation for individualized goals  Description  Treatment/Interventions: ADL retraining, Functional transfer training, LE strengthening/ROM, Elevations, Therapeutic exercise, Endurance training, Cognitive reorientation, Patient/family training, Equipment eval/education, Bed mobility, Gait training, Compensatory technique education, Continued evaluation, Spoke to MD, Spoke to nursing, Spoke to case management, Spoke to advanced practitioner, OT, Family  Equipment Recommended: (TBD)       See flowsheet documentation for full assessment, interventions and recommendations  Outcome: Progressing  Note:   Prognosis: Good  Problem List: Decreased strength, Decreased endurance, Impaired balance, Decreased mobility, Decreased coordination, Pain, Impaired tone  Assessment: Pt  reported pain and noted to fatigue with exertion  pt  noted with no LOB t/o session  Unsafe practices occ during transfers as patient reaches back only with 1UE  Pt  fatigued with stair negotiation  Ana Crews continue per POC with focus on improving strength, balance and endurance  Pt  needed assit in donning shoes  Barriers to Discharge: Decreased caregiver support, Inaccessible home environment     Recommendation: Home with family support, Home PT     PT - OK to Discharge: Yes    See flowsheet documentation for full assessment

## 2020-01-15 NOTE — PROGRESS NOTES
NEPHROLOGY PROGRESS NOTE   Allen Sotelo [de-identified] y o  male MRN: [id number]  Unit/Bed#: -02 Encounter: [id number]  Reason for Consult: Bladimir/CKd    ASSESSMENT AND PLAN:  BLADIMIR on CKD stage 4, baseline creatinine 2 8 to 3 2, follows with Dr Maggie Mckeon  -creatinine has overall improved and 3 0 yesterday  BMP results to follow today  -BLADIMIR suspected to be secondary to cardiorenal/diuretic use also concern for some progression of CKD as well  -CKD suspected to be secondary to underlying hypertension/cardiorenal syndrome/age-related nephron loss  -diuretics as below  -urine output overall decent  -avoid hypotension, nephrotoxins or NSAIDs  -BMP in a m   -recent CT scan shows no hydronephrosis     CKD anemia, hemoglobin overall stable, continue to monitor     Low bicarb, presumed metabolic acidosis in the setting of advanced renal failure  -on sodium bicarb to two tablet p o  B i d   -continue to monitor     Chronic systolic CHF/cardiomyopathy, EF 29%, status post ICD placed  -remains on Lasix 40 mg b i d  At home as outpatient  -currently not in any respiratory distress although weight seems to be slowly increasing   -may have to accept higher creatinine to keep him euvolemic  -restarted lower dose Lasix 20 mg daily and daily reassess in adjustment for diuretics   Daily standing weight  If weight continued to increase further, may consider increasing Lasix to 40 mg daily  -patient will need maintenance diuretics     Discussed above plan with primary team     SUBJECTIVE:  Patient seen and examined at bedside  No chest pain, shortness of breath, nausea, vomiting, abdominal pain or diarrhea  No urinary complaints       OBJECTIVE:  Current Weight: Weight - Scale: 73 9 kg (162 lb 14 7 oz)  Vitals:    01/15/20 0710   BP: 141/70   Pulse: 65   Resp: 18   Temp: 99 2 °F (37 3 °C)   SpO2: 95%       Intake/Output Summary (Last 24 hours) at 1/15/2020 0918  Last data filed at 1/15/2020 0710  Gross per 24 hour   Intake 1520 ml   Output 1375 ml   Net 145 ml     Wt Readings from Last 3 Encounters:   01/15/20 73 9 kg (162 lb 14 7 oz)   01/02/20 73 6 kg (162 lb 4 1 oz)   09/23/19 77 1 kg (170 lb)     Temp Readings from Last 3 Encounters:   01/15/20 99 2 °F (37 3 °C) (Temporal)   01/02/20 (!) 96 3 °F (35 7 °C) (Tympanic)   07/21/19 (!) 97 °F (36 1 °C) (Tympanic)     BP Readings from Last 3 Encounters:   01/15/20 141/70   01/02/20 152/93   09/23/19 97/52     Pulse Readings from Last 3 Encounters:   01/15/20 65   01/02/20 67   09/23/19 77        Physical Examination:  General:  Sitting in chair, no acute distress   Eyes:  Mild conjunctival pallor present  ENT:  External examination of ears and nose unremarkable  Neck:  No obvious lymphadenopathy appreciated  Respiratory:  Bilateral air entry present  CVS:  S1, S2 present  GI:  Soft, nontender, nondistended  CNS:  Active alert oriented x3  Extremities:  No significant edema in legs  Skin:  No new rash in legs    Medications:    Current Facility-Administered Medications:     acetaminophen (TYLENOL) tablet 650 mg, 650 mg, Oral, Q6H PRN, AUDREY Castañeda, 650 mg at 01/11/20 0458    atorvastatin (LIPITOR) tablet 40 mg, 40 mg, Oral, After Dayan Hutchinson MD, 40 mg at 01/14/20 1815    bisacodyl (DULCOLAX) rectal suppository 10 mg, 10 mg, Rectal, Daily PRN, Alejo Hernández MD    diphenhydrAMINE (BENADRYL) tablet 25 mg, 25 mg, Oral, Q6H PRN, Tomasz Oliveira MD, 25 mg at 01/14/20 2241    dorzolamide-timolol (COSOPT) 22 3-6 8 MG/ML ophthalmic solution 1 drop, 1 drop, Both Eyes, BID, Alejo Hernández MD, 1 drop at 01/15/20 0829    furosemide (LASIX) tablet 20 mg, 20 mg, Oral, Daily, Delta Colon MD, 20 mg at 01/15/20 0829    gabapentin (NEURONTIN) capsule 300 mg, 300 mg, Oral, Daily, AUDREY Castañeda, 300 mg at 01/15/20 1013    hydrocortisone 1 % cream, , Topical, 4x Daily PRN, Tomasz Oliveira MD    isosorbide dinitrate (ISORDIL) tablet 20 mg, 20 mg, Oral, BID after meals, Alejo Hernández MD, 20 mg at 01/15/20 0829    lactulose 20 g/30 mL oral solution 20 g, 20 g, Oral, Daily PRN, Jazmín Griggs MD    metoprolol succinate (TOPROL-XL) 24 hr tablet 25 mg, 25 mg, Oral, Daily, Najma Peace, DO, 25 mg at 01/15/20 0829    pantoprazole (PROTONIX) EC tablet 40 mg, 40 mg, Oral, Daily, AUDREY Castañeda, 40 mg at 01/15/20 0603    polyethylene glycol (MIRALAX) packet 17 g, 17 g, Oral, Daily PRN, Jazmín Griggs MD    polyvinyl alcohol (LIQUIFILM TEARS) 1 4 % ophthalmic solution 1 drop, 1 drop, Both Eyes, Q3H PRN, Jazmín Griggs MD    senna-docusate sodium (SENOKOT S) 8 6-50 mg per tablet 1 tablet, 1 tablet, Oral, BID PRN, Jazmín Griggs MD, 1 tablet at 01/07/20 2004    sodium bicarbonate tablet 1,300 mg, 1,300 mg, Oral, BID after meals, Morna Councilman, MD, 1,300 mg at 01/15/20 0829    tamsulosin (FLOMAX) capsule 0 8 mg, 0 8 mg, Oral, Daily With Dinner, AUDREY Castañeda, 0 8 mg at 01/14/20 1818    tuberculin injection 5 Units, 5 Units, Intradermal, PRN, Sydnie Bartholomew MD, 5 Units at 01/03/20 1702    warfarin (COUMADIN) tablet 3 mg, 3 mg, Oral, Daily (warfarin), AUDREY Castañeda, 3 mg at 01/14/20 1819    Laboratory Results:  Results from last 7 days   Lab Units 01/14/20  1029 01/13/20  0600 01/12/20  0443 01/11/20  0455 01/10/20  0512   WBC Thousand/uL  --  4 10*  --   --   --    HEMOGLOBIN g/dL  --  10 4*  --   --   --    HEMATOCRIT %  --  32 8*  --   --   --    PLATELETS Thousands/uL  --  202  --   --   --    SODIUM mmol/L 141 140 137 139 141   POTASSIUM mmol/L 4 5 4 5 4 9 3 9 4 3   CHLORIDE mmol/L 107 110* 107 107 107   CO2 mmol/L 23 17* 20* 22 23   BUN mg/dL 59* 65* 70* 70* 70*   CREATININE mg/dL 3 04* 3 27* 3 26* 3 58* 3 56*   CALCIUM mg/dL 9 4 9 5 9 2 9 5 9 7   PHOSPHORUS mg/dL  --   --   --   --  4 1       No orders to display       Portions of the record may have been created with voice recognition software   Occasional wrong word or "sound a like" substitutions may have occurred due to the inherent limitations of voice recognition software  Read the chart carefully and recognize, using context, where substitutions have occurred

## 2020-01-15 NOTE — PLAN OF CARE
Problem: OCCUPATIONAL THERAPY ADULT  Goal: Performs self-care activities at highest level of function for planned discharge setting  See evaluation for individualized goals  Description  Treatment Interventions: ADL retraining, Visual perceptual retraining, Functional transfer training, UE strengthening/ROM, Endurance training, Cognitive reorientation, Patient/family training, Equipment evaluation/education, Neuromuscular reeducation, Fine motor coordination activities, Compensatory technique education, Continued evaluation, Energy conservation, Activityengagement          See flowsheet documentation for full assessment, interventions and recommendations  Outcome: Adequate for Discharge  Note:   Limitation: Decreased ADL status, Decreased UE ROM, Decreased UE strength, Decreased Safe judgement during ADL, Decreased cognition, Decreased endurance, Decreased sensation, Visual deficit, Decreased fine motor control, Decreased self-care trans, Decreased high-level ADLs  Prognosis: Good, Fair  Assessment: Pt pleasant and cooperative during AM ADL with focus on  eating (Ind ), oral hygiene (Ind ), toileting (Refused) , bathing (Setup), UB dressing (Ind ), LB dressing (Ind ), and don/doff footwear ( socks only declined sneakers) (Ind ) -- increased time required for bathing and LB dressing  At length, discussed progression in OT while on TCF and achievement of POC goals  Pt limited by visual deficits, pain, ROM, weakness, fatigue and activity tolerance    Pt educated on  safe functional transfer techniques with appropriate body mechanics, fall prevention, and safe discharge planning/ safety at home   Pt ready for d/c when medically able  Pt seated in recliner with call bell in reach to conclude session     OT Discharge Recommendation: Home OT(with additional family support )  OT - OK to Discharge: No

## 2020-01-15 NOTE — SOCIAL WORK
Arash 30 discussed with patient's daughter by phone  SW signed and copy placed in scan bin  Daughter agreeable to [facility] if able to come out for a few visits before pt flies to [place] on 1/25  [facility] sent referral to [facility]

## 2020-01-15 NOTE — NURSING NOTE
Pt complaining of pruritis  Rash noted to right arm and neck  Pt medicated with  Benadryl and hydrocortisone cream   Pt c/o soreness to right side of scrotum and groin fold  No redness noted  Pt keeps wash cloth here to catch the urine if he dribbles  Explained to pt that the moisture from the cloth could be causing the soreness  Area was washed good and antifungal spray applied  Pt is noncompliant with fluid restriction  He is always asking for water and argues with staff when they try to limit him  It has been explained to him several times why he is on a restriction and why we have to measure his urine  He refuses to use collection hat in the toilet when he voids  Pt does use the urinal sometimes  So, it is difficult to keep accurate I&O

## 2020-01-16 PROCEDURE — 97110 THERAPEUTIC EXERCISES: CPT

## 2020-01-16 PROCEDURE — 97530 THERAPEUTIC ACTIVITIES: CPT

## 2020-01-16 PROCEDURE — 99232 SBSQ HOSP IP/OBS MODERATE 35: CPT | Performed by: INTERNAL MEDICINE

## 2020-01-16 PROCEDURE — 97116 GAIT TRAINING THERAPY: CPT

## 2020-01-16 RX ORDER — FUROSEMIDE 40 MG/1
40 TABLET ORAL DAILY
Status: DISCONTINUED | OUTPATIENT
Start: 2020-01-17 | End: 2020-01-17 | Stop reason: HOSPADM

## 2020-01-16 RX ADMIN — ATORVASTATIN CALCIUM 40 MG: 80 TABLET, FILM COATED ORAL at 17:27

## 2020-01-16 RX ADMIN — DORZOLAMIDE HYDROCHLORIDE AND TIMOLOL MALEATE 1 DROP: 20; 5 SOLUTION/ DROPS OPHTHALMIC at 08:30

## 2020-01-16 RX ADMIN — POLYETHYLENE GLYCOL 3350 17 G: 17 POWDER, FOR SOLUTION ORAL at 08:38

## 2020-01-16 RX ADMIN — FUROSEMIDE 20 MG: 20 TABLET ORAL at 08:29

## 2020-01-16 RX ADMIN — PANTOPRAZOLE SODIUM 40 MG: 40 TABLET, DELAYED RELEASE ORAL at 06:00

## 2020-01-16 RX ADMIN — LACTULOSE 20 G: 10 SOLUTION ORAL at 05:44

## 2020-01-16 RX ADMIN — BISACODYL 10 MG: 10 SUPPOSITORY RECTAL at 16:20

## 2020-01-16 RX ADMIN — METOPROLOL SUCCINATE 25 MG: 25 TABLET, EXTENDED RELEASE ORAL at 08:29

## 2020-01-16 RX ADMIN — TAMSULOSIN HYDROCHLORIDE 0.8 MG: 0.4 CAPSULE ORAL at 17:27

## 2020-01-16 RX ADMIN — ISOSORBIDE DINITRATE 20 MG: 10 TABLET ORAL at 17:27

## 2020-01-16 RX ADMIN — SODIUM BICARBONATE 1300 MG: 650 TABLET ORAL at 08:29

## 2020-01-16 RX ADMIN — DORZOLAMIDE HYDROCHLORIDE AND TIMOLOL MALEATE 1 DROP: 20; 5 SOLUTION/ DROPS OPHTHALMIC at 17:30

## 2020-01-16 RX ADMIN — GABAPENTIN 300 MG: 300 CAPSULE ORAL at 08:29

## 2020-01-16 RX ADMIN — SODIUM BICARBONATE 1300 MG: 650 TABLET ORAL at 17:27

## 2020-01-16 RX ADMIN — WARFARIN SODIUM 3 MG: 3 TABLET ORAL at 17:27

## 2020-01-16 RX ADMIN — ISOSORBIDE DINITRATE 20 MG: 10 TABLET ORAL at 08:29

## 2020-01-16 NOTE — PLAN OF CARE
Problem: PHYSICAL THERAPY ADULT  Goal: Performs mobility at highest level of function for planned discharge setting  See evaluation for individualized goals  Description  Treatment/Interventions: ADL retraining, Functional transfer training, LE strengthening/ROM, Elevations, Therapeutic exercise, Endurance training, Cognitive reorientation, Patient/family training, Equipment eval/education, Bed mobility, Gait training, Compensatory technique education, Continued evaluation, Spoke to MD, Spoke to nursing, Spoke to case management, Spoke to advanced practitioner, OT, Family  Equipment Recommended: (TBD)       See flowsheet documentation for full assessment, interventions and recommendations  Outcome: Completed  Note:   Prognosis: Good  Problem List: Decreased strength, Decreased endurance, Impaired balance, Decreased mobility, Decreased coordination, Pain  Assessment: Pt seen today for a PT treatment session  Pt is now completing all mobility at mod I level except for stairs negotiation which he is completing at a supervision level  He was reporting GI discomfort today and so was not able to tolerate normal therapy volume  Pt will be discharged today as he has met the majority of his goals and the unmet goals are adaquate for discharge  At end of session pt left seated on toilet as he was requesting to utilize bathroom, nsg made aware that pt was in restroom  Barriers to Discharge: Decreased caregiver support, Inaccessible home environment     Recommendation: Home with family support, Home PT     PT - OK to Discharge: Yes    See flowsheet documentation for full assessment

## 2020-01-16 NOTE — PHYSICAL THERAPY NOTE
Physical Therapy Daily Treatment Note and Discharge Summary    Patient's Name: Arely Cowan    Admitting Diagnosis  Renal failure [N19]    Problem List  Patient Active Problem List   Diagnosis    Periorbital edema    Chronic systolic congestive heart failure (HCC)    Renal disorder    GERD (gastroesophageal reflux disease)    Paroxysmal atrial fibrillation (HCC)    HTN (hypertension)    Acute renal failure superimposed on stage 4 chronic kidney disease (HCC)    CKD (chronic kidney disease), stage IV ([facility]    Secondary hyperparathyroidism ([facility]    Hypercalcemia    BPH (benign prostatic hyperplasia)    [facility]per[facility]) [facility]    Hyperkalemia    Age-related cognitive decline    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)    Chronic kidney disease-mineral and bone disorder    Dementia with psychosis ([facility]    Chronic atrial fibrillation    Macular atrophy, retinal    Chest pain    Generalized abdominal pain    MGUS (monoclonal gammopathy of unknown significance)    Pulmonary nodule    Ambulatory dysfunction    Coronary artery disease    Mixed hyperlipidemia    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    Hypertensive heart disease with chronic combined systolic and diastolic congestive heart failure (HCC)       Past Medical History  Past Medical History:   Diagnosis Date    A-fib ([facility]     CHF (congestive heart failure) (HCC)     CKD (chronic kidney disease) stage 4, GFR 15-29 ml/min (HCC)     GERD (gastroesophageal reflux disease)     Glaucoma     History of transfusion     Hypertension     Renal disorder        Past Surgical History  Past Surgical History:   Procedure Laterality Date    ABDOMINAL SURGERY      CARDIAC PACEMAKER PLACEMENT      EYE SURGERY      HERNIA REPAIR      Doesn't recall, pt states he may have      VASCULAR SURGERY         Vitals:    01/15/20 0710 01/15/20 1500 01/16/20 0600 01/16/20 0715   BP: 141/70 111/61  154/78 BP Location: Right arm Left arm  Left arm   Pulse: 65 62  65   Resp: 18 20  18   Temp: 99 2 °F (37 3 °C) 98 9 °F (37 2 °C)  98 7 °F (37 1 °C)   TempSrc: Temporal Temporal  Temporal   SpO2: 95% 96%  95%   Weight:   73 5 kg (162 lb 0 6 oz)    Height:         Total PT Treatment Time: 41 minutes     01/16/20 1030   Pain Assessment   Pain Assessment 0-10   Pain Score No Pain   Restrictions/Precautions   Weight Bearing Precautions Per Order No   Other Precautions Hard of hearing;Visual impairment; Fall Risk;Pain   General   Chart Reviewed Yes   Response to Previous Treatment Patient with no complaints from previous session  Family/Caregiver Present No   Cognition   Overall Cognitive Status WFL   Arousal/Participation Alert; Cooperative   Attention Within functional limits   Orientation Level Oriented X4   Memory Decreased recall of recent events;Decreased recall of precautions   Following Commands Follows one step commands without difficulty   Subjective   Subjective "Pt reporting needing to utilize bathroom frequently so would like to perform session in room"   Bed Mobility   Supine to Sit 6  Modified independent   Additional items Bedrails; Increased time required   Sit to Supine 6  Modified independent   Additional items Increased time required;Verbal cues   Transfers   Sit to Stand 6  Modified independent   Additional items Armrests; Increased time required   Stand to Sit 6  Modified independent   Additional items Armrests; Increased time required   Toilet transfer 6  Modified independent   Additional items Increased time required;Standard toilet;Armrests   Car transfer 6  Modified independent   Additional items Increased time required   Additional Comments Rollator used for all transfers   Ambulation/Elevation   Gait pattern Improper Weight shift;Decreased foot clearance; Excessively slow; Short stride;Narrow ERICA   Gait Assistance 6  Modified independent   Assistive Device 4-wheeled walker   Distance 200ft   Stair Management Assistance 5  Supervision   Additional items Increased time required;Verbal cues   Stair Management Technique Step to pattern; Two rails; Foreward   Number of Stairs 4   Balance   Static Sitting Good   Dynamic Sitting Good   Static Standing Fair +   Dynamic Standing Fair +   Ambulatory Fair +   Endurance Deficit   Endurance Deficit Yes   Endurance Deficit Description fatigue   Activity Tolerance   Activity Tolerance Patient tolerated treatment well   Nurse Made Aware yes   Exercises   Heelslides Sitting;10 reps  (x2 blue tb)   Hip Flexion Sitting;15 reps  (4lbs 2 sets)   Hip Abduction Sitting;15 reps  (manual resistance 2 sets)   Hip Adduction Sitting;15 reps  (manual resistance; 2 sets)   Knee AROM Long Arc Quad Sitting;15 reps  (4lbs; 2 sets)   Assessment   Prognosis Good   Problem List Decreased strength;Decreased endurance; Impaired balance;Decreased mobility; Decreased coordination;Pain   Assessment Pt seen today for a PT treatment session  Pt is now completing all mobility at mod I level except for stairs negotiation which he is completing at a supervision level  He was reporting GI discomfort today and so was not able to tolerate normal therapy volume  Pt will be discharged today as he has met the majority of his goals and the unmet goals are adaquate for discharge  At end of session pt left seated on toilet as he was requesting to utilize bathroom, nsg made aware that pt was in restroom  Goals   Patient Goals to make a BM   STG Expiration Date 01/17/20   Short Term Goal #1 grid   PT Treatment Day 12   Plan   Treatment/Interventions   (D/C)   Progress Progressing toward goals   PT Frequency   (5-7x/wk)   Recommendation   Recommendation Home with family support;Home PT   Equipment Recommended   (Rollator)   PT - OK to Discharge Yes     Physical Therapy Discharge Summary      Disposition: Patient will be discharged from skilled PT interventions effective 1/16/2020     Pt will be d/c home with home PT on 1/17/20 pending medical clearance  DME: Rollator    Discharge Summary: Pt participated in 15 PT sessions  Pt achieved 5/7 goals  Pt presently functioning at mod I for all mobility with exception of elevations  Deficits remain in:  LE strength/AROM; flexibility;  endurance; activity tolerance;  balance; home; fatigue  Pt and family deny concerns with d/c home  Pt will have VNA services, home OT/PT to ensure safety in home environment as well as functional progression  PT met with pt, pt denies questions/concerns for PT at this time  Recommendations: PT recommends pt return home with home PT  focusing on noted deficits in order to maximize safety and independence during functional activity; prevent falls and hospital readmission        GOALS:  5/7 Goals met  Goals STG achieved within  2 weeks Performance at Initial Evaluation (01/03/20) Current Performance (last date completed)   Supine to sit transitions to increase ease of transfer, allow pt to get out of bed, and decrease caregiver burden    MOD I     ACHIEVED Supervision   - HOB flat  - No rail 01/08/20   Sit to supine transitions to increase ease of transfer, allow pt to get back into bed, and decrease caregiver burden MOD I  ACHIEVED Supervision   - HOB flat  - No rail 01/8/20   Functional transfers to facilitate safe return to previous living environment     MOD I  ACHIEVED Sit <->stand tranfers: CG A     SPT with Rollator: CG A 01/15/20   Ambulation with rollator for 50 feet   ( for safe household distance ambulation)     MOD I  ACHIEVED Ambulate with rollator 17 feet with CG A 01/15/20   Ambulation with rollator for > or = 300 feet ( to initiate short community distance ambulation, Supervised by daughter)     Mod I  ACHIEVED Unable to ambulate > 17 feet with rollator ; due to very low blood pressure 01/15/20       Ascend/descend a curb step, using appropriate AD and method, no handrails, for safe access to previous living environment Mod I  NOT ACHIEVED  (supervision) Deferred today -> due to very low BP 1/7/20      Ascend/descend 8+8 stairs to enter dtrs home upon moving there post d/c           Supervision  NOT ACHIEVED  (sup for 4 steps, min A for 8+8)         Michael Foreman, PT

## 2020-01-16 NOTE — PROGRESS NOTES
NEPHROLOGY PROGRESS NOTE   Cristiano Marcos [de-identified] y o  male MRN: [id number]  Unit/Bed#: -02 Encounter: [id number]  Reason for Consult: SHAHIDA/CKD    ASSESSMENT AND PLAN:  Jazzmine Partida CKD stage 4, baseline creatinine 2 8 to 3 2, follows with Dr Kristan Wild  -Creatinine has overall improved and 2 8 today   -SHAHIDA suspected to be secondary to cardiorenal/diuretic use also concern for some progression of CKD as well  -CKD suspected to be secondary to underlying hypertension/cardiorenal syndrome/age-related nephron loss  -diuretics as below  -urine output overall decent  -avoid hypotension, nephrotoxins or NSAIDs  -BMP in a m   -recent CT scan shows no hydronephrosis     CKD anemia, hemoglobin overall stable, continue to monitor     Low bicarb, presumed metabolic acidosis in the setting of advanced renal failure  -on sodium bicarb to two tablet p o  B i d   -continue to monitor, bicarb level dropped today  Seems to be somewhat fluctuating      Chronic systolic CHF/cardiomyopathy, EF 29%, status post ICD placed  -remains on Lasix 40 mg b i d  At home as outpatient  -currently not in any respiratory distress although weight seems to be slowly increasing   -may have to accept higher creatinine to keep him euvolemic  -will increase Lasix to 40 mg daily  Discussed above plan with primary team     SUBJECTIVE:  Patient seen and examined at bedside  No chest pain, shortness of breath, nausea, vomiting, abdominal pain or diarrhea  No urinary complaints       OBJECTIVE:  Current Weight: Weight - Scale: 73 9 kg (162 lb 14 7 oz)  Vitals:    01/16/20 0715   BP: 154/78   Pulse: 65   Resp: 18   Temp: 98 7 °F (37 1 °C)   SpO2: 95%       Intake/Output Summary (Last 24 hours) at 1/16/2020 0838  Last data filed at 1/16/2020 0836  Gross per 24 hour   Intake 600 ml   Output 250 ml   Net 350 ml     Wt Readings from Last 3 Encounters:   01/15/20 73 9 kg (162 lb 14 7 oz)   01/02/20 73 6 kg (162 lb 4 1 oz)   09/23/19 77 1 kg (170 lb)     Temp Readings from Last 3 Encounters:   01/16/20 98 7 °F (37 1 °C) (Temporal)   01/02/20 (!) 96 3 °F (35 7 °C) (Tympanic)   07/21/19 (!) 97 °F (36 1 °C) (Tympanic)     BP Readings from Last 3 Encounters:   01/16/20 154/78   01/02/20 152/93   09/23/19 97/52     Pulse Readings from Last 3 Encounters:   01/16/20 65   01/02/20 67   09/23/19 77        Physical Examination:  General:  Sitting in chair, no acute distress   Eyes:  Mild conjunctival pallor present  ENT:  External examination of ears and nose unremarkable  Neck:  No obvious lymphadenopathy appreciated  Respiratory:  Bilateral air entry present  CVS:  S1, S2 present  GI:  Soft, nontender, nondistended  CNS:  Active alert oriented x3  Extremities:  No significant edema in legs  Skin:  No new rash in legs    Medications:    Current Facility-Administered Medications:     acetaminophen (TYLENOL) tablet 650 mg, 650 mg, Oral, Q6H PRN, KEON CastañedaNP, 650 mg at 01/11/20 0458    atorvastatin (LIPITOR) tablet 40 mg, 40 mg, Oral, After Martin Arroyo MD, 40 mg at 01/15/20 1758    bisacodyl (DULCOLAX) rectal suppository 10 mg, 10 mg, Rectal, Daily PRN, Tc Ji MD    diphenhydrAMINE (BENADRYL) tablet 25 mg, 25 mg, Oral, Q6H PRN, Rudy Medrano MD, 25 mg at 01/14/20 2241    dorzolamide-timolol (COSOPT) 22 3-6 8 MG/ML ophthalmic solution 1 drop, 1 drop, Both Eyes, BID, Tc Ji MD, 1 drop at 01/16/20 0830    furosemide (LASIX) tablet 20 mg, 20 mg, Oral, Daily, Delat Colon MD, 20 mg at 01/16/20 0829    gabapentin (NEURONTIN) capsule 300 mg, 300 mg, Oral, Daily, KEON CastañedaNP, 300 mg at 01/16/20 0829    hydrocortisone 1 % cream, , Topical, 4x Daily PRN, Rudy Medrano MD    isosorbide dinitrate (ISORDIL) tablet 20 mg, 20 mg, Oral, BID after meals, Tc Ji MD, 20 mg at 01/16/20 0829    lactulose 20 g/30 mL oral solution 20 g, 20 g, Oral, Daily PRN, Tc Ji MD, 20 g at 01/16/20 0544    metoprolol succinate (TOPROL-XL) 24 hr tablet 25 mg, 25 mg, Oral, Daily, Najma Peace, DO, 25 mg at 01/16/20 0829    pantoprazole (PROTONIX) EC tablet 40 mg, 40 mg, Oral, Daily, Krista Souza, KEONNP, 40 mg at 01/16/20 0600    polyethylene glycol (MIRALAX) packet 17 g, 17 g, Oral, Daily PRN, Liana Wick MD, 17 g at 01/15/20 1239    polyvinyl alcohol (LIQUIFILM TEARS) 1 4 % ophthalmic solution 1 drop, 1 drop, Both Eyes, Q3H PRN, Liana Wick MD    senna-docusate sodium (SENOKOT S) 8 6-50 mg per tablet 1 tablet, 1 tablet, Oral, BID PRN, Liana Wick MD, 1 tablet at 01/07/20 2004    sodium bicarbonate tablet 1,300 mg, 1,300 mg, Oral, BID after meals, Carmel Jordan MD, 1,300 mg at 01/16/20 0829    tamsulosin (FLOMAX) capsule 0 8 mg, 0 8 mg, Oral, Daily With Dinner, Krista Souza, CRNP, 0 8 mg at 01/15/20 1649    tuberculin injection 5 Units, 5 Units, Intradermal, PRN, Gamaliel Fields MD, 5 Units at 01/03/20 1702    warfarin (COUMADIN) tablet 3 mg, 3 mg, Oral, Daily (warfarin), Krista Souza, CRNP, 3 mg at 01/15/20 1758    Laboratory Results:  Results from last 7 days   Lab Units 01/15/20  0904 01/14/20  1029 01/13/20  0600 01/12/20  0443 01/11/20  0455 01/10/20  0512   WBC Thousand/uL  --   --  4 10*  --   --   --    HEMOGLOBIN g/dL  --   --  10 4*  --   --   --    HEMATOCRIT %  --   --  32 8*  --   --   --    PLATELETS Thousands/uL  --   --  202  --   --   --    SODIUM mmol/L 139 141 140 137 139 141   POTASSIUM mmol/L 4 3 4 5 4 5 4 9 3 9 4 3   CHLORIDE mmol/L 107 107 110* 107 107 107   CO2 mmol/L 19* 23 17* 20* 22 23   BUN mg/dL 55* 59* 65* 70* 70* 70*   CREATININE mg/dL 2 86* 3 04* 3 27* 3 26* 3 58* 3 56*   CALCIUM mg/dL 9 2 9 4 9 5 9 2 9 5 9 7   PHOSPHORUS mg/dL  --   --   --   --   --  4 1       No orders to display       Portions of the record may have been created with voice recognition software  Occasional wrong word or "sound a like" substitutions may have occurred due to the inherent limitations of voice recognition software  Read the chart carefully and recognize, using context, where substitutions have occurred

## 2020-01-17 ENCOUNTER — TELEPHONE (OUTPATIENT)
Dept: NEPHROLOGY | Facility: CLINIC | Age: 80
End: 2020-01-17

## 2020-01-17 ENCOUNTER — PATIENT OUTREACH (OUTPATIENT)
Dept: CASE MANAGEMENT | Facility: HOSPITAL | Age: 80
End: 2020-01-17

## 2020-01-17 VITALS
BODY MASS INDEX: 26.08 KG/M2 | WEIGHT: 162.26 LBS | TEMPERATURE: 98.4 F | RESPIRATION RATE: 18 BRPM | HEIGHT: 66 IN | OXYGEN SATURATION: 96 % | SYSTOLIC BLOOD PRESSURE: 150 MMHG | DIASTOLIC BLOOD PRESSURE: 65 MMHG | HEART RATE: 84 BPM

## 2020-01-17 LAB
ANION GAP SERPL CALCULATED.3IONS-SCNC: 10 MMOL/L (ref 5–14)
BUN SERPL-MCNC: 49 MG/DL (ref 5–25)
CALCIUM SERPL-MCNC: 9.8 MG/DL (ref 8.4–10.2)
CHLORIDE SERPL-SCNC: 106 MMOL/L (ref 97–108)
CO2 SERPL-SCNC: 23 MMOL/L (ref 22–30)
CREAT SERPL-MCNC: 2.81 MG/DL (ref 0.7–1.5)
GFR SERPL CREATININE-BSD FRML MDRD: 20 ML/MIN/1.73SQ M
GLUCOSE SERPL-MCNC: 87 MG/DL (ref 70–99)
POTASSIUM SERPL-SCNC: 5.1 MMOL/L (ref 3.6–5)
SODIUM SERPL-SCNC: 139 MMOL/L (ref 137–147)

## 2020-01-17 PROCEDURE — 80048 BASIC METABOLIC PNL TOTAL CA: CPT | Performed by: INTERNAL MEDICINE

## 2020-01-17 PROCEDURE — NC001 PR NO CHARGE: Performed by: NURSE PRACTITIONER

## 2020-01-17 PROCEDURE — 99316 NF DSCHRG MGMT 30 MIN+: CPT | Performed by: INTERNAL MEDICINE

## 2020-01-17 RX ADMIN — DORZOLAMIDE HYDROCHLORIDE AND TIMOLOL MALEATE 1 DROP: 20; 5 SOLUTION/ DROPS OPHTHALMIC at 09:07

## 2020-01-17 RX ADMIN — METOPROLOL SUCCINATE 25 MG: 25 TABLET, EXTENDED RELEASE ORAL at 09:02

## 2020-01-17 RX ADMIN — DIPHENHYDRAMINE HCL 25 MG: 25 TABLET ORAL at 14:27

## 2020-01-17 RX ADMIN — FUROSEMIDE 40 MG: 40 TABLET ORAL at 09:03

## 2020-01-17 RX ADMIN — ISOSORBIDE DINITRATE 20 MG: 10 TABLET ORAL at 09:03

## 2020-01-17 RX ADMIN — SODIUM BICARBONATE 1300 MG: 650 TABLET ORAL at 09:02

## 2020-01-17 RX ADMIN — GABAPENTIN 300 MG: 300 CAPSULE ORAL at 09:03

## 2020-01-17 RX ADMIN — PANTOPRAZOLE SODIUM 40 MG: 40 TABLET, DELAYED RELEASE ORAL at 05:21

## 2020-01-17 NOTE — TELEPHONE ENCOUNTER
----- Message from Maco Gao sent at 1/17/2020  3:03 PM EST -----  SHAHIDA follow up with repeat BMP prior to appointment   Thanks

## 2020-01-17 NOTE — SOCIAL WORK
[facility] cannot start with PT/OT for 5-7 days after discharge  SW sent referral to [facility] who is able to have a few visits before patient goes to [place]  Contact information for their agency provided on AVS      SW now informed with Camella Krabbe from [facility] they can do [facility] for Saturday or Sunday  SW notified [address] [place] [address] and will continue to use [facility] as this was family's preference  Pt accepted for RN/PT/OT services

## 2020-01-17 NOTE — PROGRESS NOTES
Patient discharged  Discussed with Dr Arik Maldonado  Patient discharged on Lasix 40 mg PO daily  He was instructed to follow with Nephrology at discharge but states that he is leaving to [place] at the end of the month  We discussed following with Nephrology in [place] or if he returns local to the area to continue to follow with Dr Mixon  I instructed him to obtain BMP in one week       Thanks

## 2020-01-17 NOTE — DISCHARGE SUMMARY
Discharge- Maryse Kierra 1940, [de-identified] y o  male MRN: [id number]    Unit/Bed#: -02 Encounter: [id number]    Primary Care Provider: Dariel Valdez PA-C   Date and time admitted to hospital: 1/2/2020  8:20 PM        Hypertensive heart disease with chronic combined systolic and diastolic congestive heart failure [facility])  Assessment & Plan  Wt Readings from Last 3 Encounters:   01/17/20 73 6 kg (162 lb 4 1 oz)   01/02/20 73 6 kg (162 lb 4 1 oz)   09/23/19 77 1 kg (170 lb)     Patient's blood pressures have been low normal range  Secondary to recent elevation creatinine will try to be a little bit more liberal with blood pressure control  Isordil was recently decreased and hydralazine was discontinued  Try to keep systolic blood pressure more than 130  Blood pressure control has improved        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 [facility])  Assessment & Plan  Patient has known dementia with behavioral disturbance  He is currently at his baseline  Needs fall precautions and monitor his behaviors during the evenings for signs of sundowning  Patient at present time cooperative    Mixed hyperlipidemia  Assessment & Plan  Continue Lipitor    Anemia due to stage 4 chronic kidney disease ([facility] [address] )  Assessment & Plan  Patient's hemoglobin is 10 4  Patient has anemia of chronic disease and renal dysfunction   Stable at this time  Kidney function remained stable avoid nephrotoxin medication    PAD (peripheral artery disease) [facility])  Assessment & Plan  Patient has known peripheral arterial disease  Continue Coumadin for now  Continue adequate blood pressure control and statin therapy   right lower extremity stent previously placed  Patient has chronic neuropathic pain and continue Neurontin  Also needs physical therapy occupational therapy to help with improving his stamina and endurance and performance of ADL and IADL      Also has history of DVT status post IVC filter    BPH (benign prostatic hyperplasia)  Assessment & Plan  Stable on Flomax 0 8 mg daily  Voiding appears to be normal at this time    Paroxysmal atrial fibrillation [facility])  Assessment & Plan  EKG currently shows atrial paced rhythm  Continue metoprolol for rate control and Coumadin for anticoagulation  INR is therapeutic  Continue Coumadin 3 mg INR is therapeutic    GERD (gastroesophageal reflux disease)  Assessment & Plan  Stable on Protonix    Chronic systolic congestive heart failure [facility])  Assessment & Plan  Wt Readings from Last 3 Encounters:   01/17/20 73 6 kg (162 lb 4 1 oz)   01/02/20 73 6 kg (162 lb 4 1 oz)   09/23/19 77 1 kg (170 lb)     Is currently euvolemic  He has known history of chronic systolic CHF with EF of 19% on last 2D echo done 02/19  Patient's baseline weight is 162 lb -166 lb  Currently he is on Lasix 20 mg daily  His weight is down to 160 lb which is below his baseline weight and his renal function is acceptable at this time  When we increased his Lasix dose to 40 mg daily his renal function got worsened  No peripheral edema      * Acute renal failure superimposed on stage 4 chronic kidney disease [facility])  Assessment & Plan  Patient was recently admitted to [facility] secondary to acute kidney injury on CKD stage 4  Baseline creatinine is 2 4-3 2  Peak creatinine on prior admission was 4 2  Continue Lasix 20 mg daily and monitor renal function closely   Avoid nephrotoxin agents  Avoid hypotension  No hydronephrosis noted on imaging and normal postvoid residual volumes noted  Appreciate Nephrology input    Patient is very weak and deconditioned requires physical therapy and occupation therapy for evaluation and to work with him to help improve his ambulatory status    Endurance, stamina and performance of ADL and IADL  BUN creatinine is stable outpatient needs to watch closely                          Discharging Physician / Practitioner: Maria E Avila MD  PCP: Kristy Vazquez PA-C  Admission Date:   Admission Orders (From admission, onward)     Ordered        01/03/20 1350  Admit Patient to  Once                   Discharge Date: 01/17/20    Resolved Problems  Date Reviewed: 1/17/2020    None          Consultations During Hospital Stay:  · PT OT    Procedures Performed:   · None    Significant Findings / Test Results:   · None    Incidental Findings:   · None     Test Results Pending at Discharge (will require follow up): · None     Outpatient Tests Requested:  · None    Complications:  None    Reason for Admission:  patient admitted with chest pain generalized weakness needed inpatient rehab    Hospital Course:     Remberto Shields is a [de-identified] y o  male patient who originally presented to the hospital on 1/2/2020 due to patient was admitted at [facility] with chest pain found to be generalized weak and unsteady gait also worsening of kidney function needed inpatient rehab admitted at [facility] at rehab [facility] patient receive med and maximum benefit physiotherapy and occupational therapy receive massive murmur benefit due to be discharged home seen and follow by nephrologist        Please see above list of diagnoses and related plan for additional information  Condition at Discharge: stable     Discharge Day Visit / Exam:     Subjective:  No complain  Vitals: Blood Pressure: 150/65 (01/17/20 0700)  Pulse: 84 (01/17/20 0700)  Temperature: 98 4 °F (36 9 °C) (01/17/20 0700)  Temp Source: Temporal (01/17/20 0700)  Respirations: 18 (01/17/20 0700)  Height: 5' 6" (167 6 cm) (01/02/20 2001)  Weight - Scale: 73 6 kg (162 lb 4 1 oz) (01/17/20 0600)  SpO2: 96 % (01/17/20 0700)  Exam:   Physical Exam   Constitutional: He is oriented to person, place, and time  He appears well-developed and well-nourished  HENT:   Head: Normocephalic and atraumatic     Right Ear: External ear normal    Left Ear: External ear normal    Mouth/Throat: Oropharynx is clear and moist    Eyes: Pupils are equal, round, and reactive to light  Conjunctivae and EOM are normal    Neck: Normal range of motion  Neck supple  Cardiovascular: Normal heart sounds and intact distal pulses  Irregularly irregular   Pulmonary/Chest: Effort normal and breath sounds normal    Abdominal: Soft  Bowel sounds are normal  He exhibits no mass  There is no tenderness  There is no rebound and no guarding  Genitourinary:   Genitourinary Comments: deferred   Musculoskeletal: Normal range of motion  Neurological: He is alert and oriented to person, place, and time  He has normal reflexes  Cranial nerves 2-12 are normal   Normal neurological exam could  Confused   Skin: Skin is warm and dry  No rash noted  Psychiatric: He has a normal mood and affect  Pleasant and cooperative   Nursing note and vitals reviewed  Discussion with Family:  Patient    Discharge instructions/Information to patient and family:   See after visit summary for information provided to patient and family  Provisions for Follow-Up Care:  See after visit summary for information related to follow-up care and any pertinent home health orders  Disposition:     Home with VNA Services (Reminder: Complete face to face encounter)    For Discharges to [facility]:   · Not Applicable to this Patient - Not Applicable to this Patient    Planned Readmission:  None     Discharge Statement:  I spent 45 minutes minutes discharging the patient  This time was spent on the day of discharge  I had direct contact with the patient on the day of discharge  Greater than 50% of the total time was spent examining patient, answering all patient questions, arranging and discussing plan of care with patient as well as directly providing post-discharge instructions  Additional time then spent on discharge activities  Discharge Medications:  See after visit summary for reconciled discharge medications provided to patient and family        ** Please Note: This note has been constructed using a voice recognition system **

## 2020-01-20 ENCOUNTER — PATIENT OUTREACH (OUTPATIENT)
Dept: CASE MANAGEMENT | Facility: HOSPITAL | Age: 80
End: 2020-01-20

## 2020-01-20 NOTE — TELEPHONE ENCOUNTER
Spoke to patient's daughter in regard to scheduling HFU  She explained patient is moving to [place] this Saturday and will establish care with an nephrologist when he gets there

## 2020-01-22 NOTE — CASE MANAGEMENT
The admission date and time were entered incorrectly  [facility] admission date and time s 1/2/2020 at 2020

## 2020-03-31 ENCOUNTER — EPISODE CHANGES (OUTPATIENT)
Dept: CASE MANAGEMENT | Facility: OTHER | Age: 80
End: 2020-03-31

## 2024-01-07 NOTE — ASSESSMENT & PLAN NOTE
Continue Lipitor
Continue Lipitor
EKG currently shows atrial paced rhythm  Continue metoprolol for rate control and Coumadin for anticoagulation    INR is therapeutic
EKG currently shows atrial paced rhythm  Continue metoprolol for rate control and Coumadin for anticoagulation    INR is therapeutic  Continue Coumadin 3 mg INR is therapeutic
Patient has known dementia with behavioral disturbance    He is currently at his baseline  Needs fall precautions and monitor his behaviors during the evenings for signs of sundowning
Patient has known dementia with behavioral disturbance    He is currently at his baseline  Needs fall precautions and monitor his behaviors during the evenings for signs of sundowning  Patient at present time cooperative
Patient has known peripheral arterial disease  Continue Coumadin for now  Continue adequate blood pressure control and statin therapy   right lower extremity stent previously placed  Patient has chronic neuropathic pain and continue Neurontin  Also needs physical therapy occupational therapy to help with improving his stamina and endurance and performance of ADL and IADL      Also has history of DVT status post IVC filter
Patient was recently admitted to [facility] [place] secondary to acute kidney injury on CKD stage 4  Baseline creatinine is 2 4-3 2  Peak creatinine on prior admission was 4 2  Continue Lasix and monitor renal function closely every week  Avoid nephrotoxin agents  Avoid hypotension  Patient recently had some bouts of hypotension and hence need to be cautious  No hydronephrosis noted on imaging and normal postvoid residual volumes noted  Will need outpatient follow-up with Nephrology    Patient is very weak and deconditioned requires physical therapy and occupation therapy for evaluation and to work with him to help improve his ambulatory status    Endurance, stamina and performance of ADL and IADL
Patient was recently admitted to [facility] [place] secondary to acute kidney injury on CKD stage 4  Baseline creatinine is 2 4-3 2  Peak creatinine on prior admission was 4 2  Continue Lasix 20 mg daily and monitor renal function closely   Avoid nephrotoxin agents  Avoid hypotension  No hydronephrosis noted on imaging and normal postvoid residual volumes noted  Appreciate Nephrology input    Patient is very weak and deconditioned requires physical therapy and occupation therapy for evaluation and to work with him to help improve his ambulatory status    Endurance, stamina and performance of ADL and IADL  BUN creatinine is stable outpatient needs to watch closely
Patient was recently admitted to [facility] [place] [facility] [place] secondary to acute kidney injury on CKD stage 4  Baseline creatinine is 2 4-3 2  Peak creatinine on prior admission was 4 2  Continue Lasix 20 mg daily and monitor renal function closely   Avoid nephrotoxin agents  Avoid hypotension  No hydronephrosis noted on imaging and normal postvoid residual volumes noted  Appreciate Nephrology input    Patient is very weak and deconditioned requires physical therapy and occupation therapy for evaluation and to work with him to help improve his ambulatory status    Endurance, stamina and performance of ADL and IADL
Patient was recently admitted to [facility] [place] [facility] [place] secondary to acute kidney injury on CKD stage 4  Baseline creatinine is 2 4-3 2  Peak creatinine on prior admission was 4 2  Continue Lasix and monitor renal function closely every week  Avoid nephrotoxin agents  Avoid hypotension  Patient recently had some bouts of hypotension and hence need to be cautious  No hydronephrosis noted on imaging and normal postvoid residual volumes noted  Appreciate Nephrology input    Patient is very weak and deconditioned requires physical therapy and occupation therapy for evaluation and to work with him to help improve his ambulatory status    Endurance, stamina and performance of ADL and IADL
Patient's hemoglobin is 10 4  Patient has anemia of chronic disease and renal dysfunction     Stable at this time
Patient's hemoglobin is 10 4  Patient has anemia of chronic disease and renal dysfunction     Stable at this time  Kidney function remained stable avoid nephrotoxin medication
Patient's hemoglobin is 9 9  Patient has anemia of chronic disease and renal dysfunction     Stable at this time
Patient's hemoglobin is 9 9  Patient has anemia of chronic disease and renal dysfunction     Stable at this time
Stable on Flomax 0 8 mg daily    Voiding appears to be normal at this time
Stable on Protonix
Will check a lipid panel on Monday    Lipitor dose recently decreased
Will check a lipid panel on Monday    Patient's recent lipid Lantus have been normal   Will decrease Lipitor from 80 mg daily to 40 mg daily
Wt Readings from Last 3 Encounters:   01/03/20 75 7 kg (166 lb 14 2 oz)   01/02/20 73 6 kg (162 lb 4 1 oz)   09/23/19 77 1 kg (170 lb)     Is currently euvolemic  He has known history of chronic systolic CHF with EF of 47% on last 2D echo done 02/19  Patient's baseline weight is 162 lb -166 lb  Currently he is on Lasix 40 mg daily  If his weight increases above 170 lb will need to increase his diuretic dose  Monitor renal function during diuresis as he recently had SHAHIDA    Check BMP on Monday
Wt Readings from Last 3 Encounters:   01/07/20 72 kg (158 lb 11 7 oz)   01/02/20 73 6 kg (162 lb 4 1 oz)   09/23/19 77 1 kg (170 lb)     Is currently euvolemic  He has known history of chronic systolic CHF with EF of 57% on last 2D echo done 02/19  Patient's baseline weight is 162 lb -166 lb  Currently he is on Lasix 40 mg daily    His weight is down to 158 lb which is below his baseline weight
Wt Readings from Last 3 Encounters:   01/07/20 72 kg (158 lb 11 7 oz)   01/02/20 73 6 kg (162 lb 4 1 oz)   09/23/19 77 1 kg (170 lb)     Patient's blood pressures have been low normal range  Secondary to recent elevation creatinine will try to be a little bit more liberal with blood pressure control  Will decrease hydralazine from 50 mg Q 8-10 mg Q 8   Continue Isordil and beta-blockers for now and observe  Try to keep systolic blood pressure more than 130
Wt Readings from Last 3 Encounters:   01/13/20 72 6 kg (160 lb 0 9 oz)   01/02/20 73 6 kg (162 lb 4 1 oz)   09/23/19 77 1 kg (170 lb)     Is currently euvolemic  He has known history of chronic systolic CHF with EF of 53% on last 2D echo done 02/19  Patient's baseline weight is 162 lb -166 lb  Currently he is on Lasix 20 mg daily  His weight is down to 160 lb which is below his baseline weight and his renal function is acceptable at this time    When we increased his Lasix dose to 40 mg daily his renal function got worsened
Wt Readings from Last 3 Encounters:   01/13/20 72 6 kg (160 lb 0 9 oz)   01/02/20 73 6 kg (162 lb 4 1 oz)   09/23/19 77 1 kg (170 lb)     Patient's blood pressures have been low normal range  Secondary to recent elevation creatinine will try to be a little bit more liberal with blood pressure control  Isordil was recently decreased and hydralazine was discontinued    Try to keep systolic blood pressure more than 130  Blood pressure control has improved
Wt Readings from Last 3 Encounters:   01/17/20 73 6 kg (162 lb 4 1 oz)   01/02/20 73 6 kg (162 lb 4 1 oz)   09/23/19 77 1 kg (170 lb)     Is currently euvolemic  He has known history of chronic systolic CHF with EF of 45% on last 2D echo done 02/19  Patient's baseline weight is 162 lb -166 lb  Currently he is on Lasix 20 mg daily  His weight is down to 160 lb which is below his baseline weight and his renal function is acceptable at this time    When we increased his Lasix dose to 40 mg daily his renal function got worsened  No peripheral edema
Wt Readings from Last 3 Encounters:   01/17/20 73 6 kg (162 lb 4 1 oz)   01/02/20 73 6 kg (162 lb 4 1 oz)   09/23/19 77 1 kg (170 lb)     Patient's blood pressures have been low normal range  Secondary to recent elevation creatinine will try to be a little bit more liberal with blood pressure control  Isordil was recently decreased and hydralazine was discontinued    Try to keep systolic blood pressure more than 130  Blood pressure control has improved
md